# Patient Record
Sex: FEMALE | Race: WHITE | NOT HISPANIC OR LATINO | Employment: OTHER | URBAN - METROPOLITAN AREA
[De-identification: names, ages, dates, MRNs, and addresses within clinical notes are randomized per-mention and may not be internally consistent; named-entity substitution may affect disease eponyms.]

---

## 2019-01-15 ENCOUNTER — TRANSCRIBE ORDERS (OUTPATIENT)
Dept: ADMINISTRATIVE | Facility: HOSPITAL | Age: 79
End: 2019-01-15

## 2019-01-15 ENCOUNTER — APPOINTMENT (OUTPATIENT)
Dept: LAB | Facility: HOSPITAL | Age: 79
End: 2019-01-15
Payer: COMMERCIAL

## 2019-01-15 DIAGNOSIS — Z13.6 SCREENING FOR CARDIOVASCULAR CONDITION: Primary | ICD-10-CM

## 2019-01-15 DIAGNOSIS — I10 ESSENTIAL HYPERTENSION, BENIGN: ICD-10-CM

## 2019-01-15 DIAGNOSIS — E03.9 HYPOTHYROIDISM (ACQUIRED): ICD-10-CM

## 2019-01-15 DIAGNOSIS — I20.9 ANGINA PECTORIS SYNDROME (HCC): ICD-10-CM

## 2019-01-15 DIAGNOSIS — E78.1 PURE HYPERGLYCERIDEMIA: ICD-10-CM

## 2019-01-15 DIAGNOSIS — Z13.6 SCREENING FOR CARDIOVASCULAR CONDITION: ICD-10-CM

## 2019-01-15 LAB
ANION GAP SERPL CALCULATED.3IONS-SCNC: 6 MMOL/L (ref 4–13)
BUN SERPL-MCNC: 16 MG/DL (ref 7–25)
CALCIUM SERPL-MCNC: 9.7 MG/DL (ref 8.6–10.5)
CHLORIDE SERPL-SCNC: 98 MMOL/L (ref 98–107)
CHOLEST SERPL-MCNC: 131 MG/DL (ref 0–200)
CO2 SERPL-SCNC: 36 MMOL/L (ref 21–31)
CREAT SERPL-MCNC: 0.97 MG/DL (ref 0.6–1.2)
GFR SERPL CREATININE-BSD FRML MDRD: 57 ML/MIN/1.73SQ M
GLUCOSE P FAST SERPL-MCNC: 111 MG/DL (ref 65–99)
HDLC SERPL-MCNC: 57 MG/DL (ref 40–60)
LDLC SERPL CALC-MCNC: 63 MG/DL (ref 75–193)
NONHDLC SERPL-MCNC: 74 MG/DL
POTASSIUM SERPL-SCNC: 3.3 MMOL/L (ref 3.5–5.5)
SODIUM SERPL-SCNC: 140 MMOL/L (ref 134–143)
TRIGL SERPL-MCNC: 55 MG/DL (ref 44–166)
TSH SERPL DL<=0.05 MIU/L-ACNC: 3.07 UIU/ML (ref 0.45–5.33)

## 2019-01-15 PROCEDURE — 84443 ASSAY THYROID STIM HORMONE: CPT

## 2019-01-15 PROCEDURE — 36415 COLL VENOUS BLD VENIPUNCTURE: CPT

## 2019-01-15 PROCEDURE — 80061 LIPID PANEL: CPT

## 2019-01-15 PROCEDURE — 80048 BASIC METABOLIC PNL TOTAL CA: CPT

## 2019-12-03 ENCOUNTER — TRANSCRIBE ORDERS (OUTPATIENT)
Dept: PHYSICAL THERAPY | Facility: CLINIC | Age: 79
End: 2019-12-03

## 2019-12-04 ENCOUNTER — EVALUATION (OUTPATIENT)
Dept: PHYSICAL THERAPY | Facility: CLINIC | Age: 79
End: 2019-12-04
Payer: COMMERCIAL

## 2019-12-04 DIAGNOSIS — M54.2 NECK PAIN: Primary | ICD-10-CM

## 2019-12-04 PROCEDURE — 97535 SELF CARE MNGMENT TRAINING: CPT | Performed by: PHYSICAL THERAPIST

## 2019-12-04 PROCEDURE — 97162 PT EVAL MOD COMPLEX 30 MIN: CPT | Performed by: PHYSICAL THERAPIST

## 2019-12-04 PROCEDURE — 97012 MECHANICAL TRACTION THERAPY: CPT | Performed by: PHYSICAL THERAPIST

## 2019-12-04 PROCEDURE — 97140 MANUAL THERAPY 1/> REGIONS: CPT | Performed by: PHYSICAL THERAPIST

## 2019-12-04 NOTE — PROGRESS NOTES
PT Evaluation     Today's date: 2019  Patient name: Lisy Min  : 1940  MRN: [id number]  Referring provider: Jennifer Ram DO  Dx:   Encounter Diagnosis     ICD-10-CM    1  Neck pain M54 2                   Assessment  Assessment details: PT notes the patient with decrease ROM and strength t/o the cervical spine as well as the bilateral shoulder and UE with demonstration of fair UB posture leading to increase pain levels and functional limitations with need for course of skilled therapy for 6 weeks with focus on cervical/UB stabilization, manual therapy, mechanical traction, analgesic modalities, and HEP  Impairments: abnormal or restricted ROM, activity intolerance, impaired balance, impaired physical strength, lacks appropriate home exercise program, pain with function, scapular dyskinesis and poor posture   Understanding of Dx/Px/POC: good   Prognosis: good    Goals  ST  Initiate HEP  2  Decrease guarding/shoulder elevation with seated posture  3  Increase strength by 1-2 mm grades  4  Increase ROM by 25-50%   5  Decrease pain by 25-50%   LT  Decrease limitations with lifting, OH, carrying and ADL  2  Demonstration of improved UB posture  3  No radicular symptoms in the bilateral UE  4  DC with HEP    Plan  Patient would benefit from: PT eval and skilled physical therapy  Planned modality interventions: thermotherapy: hydrocollator packs and traction  Planned therapy interventions: manual therapy, patient education, postural training, neuromuscular re-education, strengthening, stretching, therapeutic exercise, home exercise program, graded exercise, functional ROM exercises and flexibility        Subjective Evaluation    History of Present Illness  Mechanism of injury: Patient reports development of neck pain about 1 5 weeks ago while exercising at home with progressive worsening over time with increase pain levels and tightness in the neck and UB    Patient reports limitations with movement, ADL, hair care, lifting and carrying objects  Patient reports with the increase symptoms also experiencing tingling in the bilateral UE traveling from the shoulders and hand/digits  Patient reports occasional unsteadiness with walking with increase pain levels  Patient reports she went to PCP for evaluation and received orders for MRI of the cervical spine as well as orders for OPPT  Patient reports significant PMH of bilateral CTR, left thumb tendon repair, Grave's Disease, and HBP  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  Pain  Current pain ratin  At best pain ratin  At worst pain rating: 10  Location: Cervical spine and UB   Quality: burning, discomfort, radiating, throbbing and tight  Relieving factors: rest  Aggravating factors: lifting and overhead activity    Treatments  Current treatment: medication and physical therapy  Patient Goals  Patient goals for therapy: decreased pain, increased motion, increased strength and independence with ADLs/IADLs          Objective     Postural Observations  Seated posture: fair  Standing posture: fair    Additional Postural Observation Details  PT notes the patient with fair UB posture with bilateral elevated shoulders, forward head and bilateral rounded shoulders     Palpation   Left   Muscle spasm in the cervical paraspinals, levator scapulae, rhomboids, scalenes and upper trapezius  Tenderness of the cervical paraspinals, levator scapulae, rhomboids, scalenes, suboccipitals and upper trapezius  Right   Muscle spasm in the cervical paraspinals, levator scapulae, rhomboids, scalenes and upper trapezius  Tenderness of the cervical paraspinals, levator scapulae, rhomboids, scalenes, suboccipitals and upper trapezius  Tenderness   Cervical Spine   Tenderness in the spinous process       Neurological Testing     Reflexes   Left   Biceps (C5/C6): normal (2+)  Brachioradialis (C6): normal (2+)    Right   Biceps (C5/C6): normal (2+)  Brachioradialis (C6): normal (2+)    Active Range of Motion   Cervical/Thoracic Spine       Cervical    Flexion: 19 degrees   Extension: 13 degrees     with pain  Left lateral flexion: 12 degrees     with pain  Right lateral flexion: 14 degrees     with pain  Left rotation: 41 degrees with pain  Right rotation: 37 degrees    with pain  Left Shoulder   Flexion: 140 degrees   Abduction: 135 degrees   External rotation 90°: 70 degrees   Internal rotation 90°: 50 degrees     Right Shoulder   Flexion: 140 degrees   Abduction: 135 degrees   External rotation 90°: 70 degrees  Internal rotation 90°: 50 degrees     Left Elbow   Normal active range of motion    Right Elbow   Normal active range of motion    Additional Active Range of Motion Details  PT notes the patient with decrease ROM and strength t/o the cervical spine and UB   PT notes decrease ROM and strength t/o the bilateral shoulders     Passive Range of Motion   Left Shoulder   Normal passive range of motion    Right Shoulder   Normal passive range of motion    Strength/Myotome Testing   Cervical Spine   Neck extension: 3+    Left   Neck lateral flexion (C3): 3+    Right   Neck lateral flexion (C3): 3+    Left Shoulder     Planes of Motion   Flexion: 3+   Extension: 4   Abduction: 3+   Adduction: 4   External rotation at 0°: 3+   External rotation at 90°: 3+   Internal rotation at 0°: 4-   Internal rotation at 90°: 4-     Right Shoulder     Planes of Motion   Flexion: 3+   Extension: 4   Abduction: 3+   Adduction: 4   External rotation at 0°: 3+   External rotation at 90°: 3+   Internal rotation at 0°: 4-   Internal rotation at 90°: 4-     Left Elbow   Flexion: 4  Extension: 4    Right Elbow   Flexion: 4  Extension: 4    Tests   Cervical   Positive vertical compression and lumbar distraction test     Left   Positive Spurling's Test A  Right   Positive Spurling's Test A  Lumbar   Positive vertical compression        Additional Tests Details  PT notes relief of symptoms with cervical traction             Precautions:  DJD/DD t/o the cervical spine        Manual  12/4       Cervical and UB mobs and stretching  15 min        Mechanical traction 4 steps   13# 30% rope speed  15 min                                    Exercise Diary  12/4       UBE         TB MTP, LTP, and ADD [address]        Scapular wall slides         Scapular pinch into wall         Wall push-up         Doorway pec stretch         Caudel glide         Cervical rotation towel stretch         Supine chin tucks         Supine press and flex        Supine Punch                                                                                     Modalities 12/4        MHP to the bilateral UB in seated  15 min

## 2019-12-04 NOTE — LETTER
2019    Mk Burrows DO  [address]    Patient: Kilo Vallejo   YOB: 1940   Date of Visit: 2019     Encounter Diagnosis     ICD-10-CM    1  Neck pain M54 2        Dear Dr Isis Olson: Thank you for your recent referral of Kilo Vallejo  Please review the attached evaluation summary from [address] [place] [address] recent visit  Please verify that you agree with the plan of care by signing the attached order  If you have any questions or concerns, please do not hesitate to call  I sincerely appreciate the opportunity to share in the care of one of your patients and hope to have another opportunity to work with you in the near future  Sincerely,    Chandana Garcia, PT      Referring Provider:      I certify that I have read the below Plan of Care and certify the need for these services furnished under this plan of treatment while under my care  Mk Burrows DO  [address]: [phone]          PT Evaluation     Today's date: 2019  Patient name: Kilo Vallejo  : 1940  MRN: [id number]  Referring provider: Diana Luong DO  Dx:   Encounter Diagnosis     ICD-10-CM    1  Neck pain M54 2                   Assessment  Assessment details: PT notes the patient with decrease ROM and strength t/o the cervical spine as well as the bilateral shoulder and UE with demonstration of fair UB posture leading to increase pain levels and functional limitations with need for course of skilled therapy for 6 weeks with focus on cervical/UB stabilization, manual therapy, mechanical traction, analgesic modalities, and HEP  Impairments: abnormal or restricted ROM, activity intolerance, impaired balance, impaired physical strength, lacks appropriate home exercise program, pain with function, scapular dyskinesis and poor posture   Understanding of Dx/Px/POC: good   Prognosis: good    Goals  ST  Initiate HEP  2  Decrease guarding/shoulder elevation with seated posture  3  Increase strength by 1-2 mm grades  4  Increase ROM by 25-50%   5  Decrease pain by 25-50%   LT  Decrease limitations with lifting, OH, carrying and ADL  2  Demonstration of improved UB posture  3  No radicular symptoms in the bilateral UE  4  DC with HEP    Plan  Patient would benefit from: PT eval and skilled physical therapy  Planned modality interventions: thermotherapy: hydrocollator packs and traction  Planned therapy interventions: manual therapy, patient education, postural training, neuromuscular re-education, strengthening, stretching, therapeutic exercise, home exercise program, graded exercise, functional ROM exercises and flexibility        Subjective Evaluation    History of Present Illness  Mechanism of injury: Patient reports development of neck pain about 1 5 weeks ago while exercising at home with progressive worsening over time with increase pain levels and tightness in the neck and UB  Patient reports limitations with movement, ADL, hair care, lifting and carrying objects  Patient reports with the increase symptoms also experiencing tingling in the bilateral UE traveling from the shoulders and hand/digits  Patient reports occasional unsteadiness with walking with increase pain levels  Patient reports she went to PCP for evaluation and received orders for MRI of the cervical spine as well as orders for OPPT  Patient reports significant PMH of bilateral CTR, left thumb tendon repair, Grave's Disease, and HBP  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      Pain  Current pain ratin  At best pain ratin  At worst pain rating: 10  Location: Cervical spine and UB   Quality: burning, discomfort, radiating, throbbing and tight  Relieving factors: rest  Aggravating factors: lifting and overhead activity    Treatments  Current treatment: medication and physical therapy  Patient Goals  Patient goals for therapy: decreased pain, increased motion, increased strength and independence with ADLs/IADLs          Objective     Postural Observations  Seated posture: fair  Standing posture: fair    Additional Postural Observation Details  PT notes the patient with fair UB posture with bilateral elevated shoulders, forward head and bilateral rounded shoulders     Palpation   Left   Muscle spasm in the cervical paraspinals, levator scapulae, rhomboids, scalenes and upper trapezius  Tenderness of the cervical paraspinals, levator scapulae, rhomboids, scalenes, suboccipitals and upper trapezius  Right   Muscle spasm in the cervical paraspinals, levator scapulae, rhomboids, scalenes and upper trapezius  Tenderness of the cervical paraspinals, levator scapulae, rhomboids, scalenes, suboccipitals and upper trapezius  Tenderness   Cervical Spine   Tenderness in the spinous process       Neurological Testing     Reflexes   Left   Biceps (C5/C6): normal (2+)  Brachioradialis (C6): normal (2+)    Right   Biceps (C5/C6): normal (2+)  Brachioradialis (C6): normal (2+)    Active Range of Motion   Cervical/Thoracic Spine       Cervical    Flexion: 19 degrees   Extension: 13 degrees     with pain  Left lateral flexion: 12 degrees     with pain  Right lateral flexion: 14 degrees     with pain  Left rotation: 41 degrees with pain  Right rotation: 37 degrees    with pain  Left Shoulder   Flexion: 140 degrees   Abduction: 135 degrees   External rotation 90°:  70 degrees   Internal rotation 90°:  50 degrees     Right Shoulder   Flexion: 140 degrees   Abduction: 135 degrees   External rotation 90°:  70 degrees  Internal rotation 90°:  50 degrees     Left Elbow   Normal active range of motion    Right Elbow   Normal active range of motion    Additional Active Range of Motion Details  PT notes the patient with decrease ROM and strength t/o the cervical spine and UB   PT notes decrease ROM and strength t/o the bilateral shoulders Passive Range of Motion   Left Shoulder   Normal passive range of motion    Right Shoulder   Normal passive range of motion    Strength/Myotome Testing   Cervical Spine   Neck extension: 3+    Left   Neck lateral flexion (C3): 3+    Right   Neck lateral flexion (C3): 3+    Left Shoulder     Planes of Motion   Flexion: 3+   Extension: 4   Abduction: 3+   Adduction: 4   External rotation at 0°:  3+   External rotation at 90°:  3+   Internal rotation at 0°:  4-   Internal rotation at 90°:  4-     Right Shoulder     Planes of Motion   Flexion: 3+   Extension: 4   Abduction: 3+   Adduction: 4   External rotation at 0°:  3+   External rotation at 90°:  3+   Internal rotation at 0°:  4-   Internal rotation at 90°:  4-     Left Elbow   Flexion: 4  Extension: 4    Right Elbow   Flexion: 4  Extension: 4    Tests   Cervical   Positive vertical compression and lumbar distraction test     Left   Positive Spurling's Test A  Right   Positive Spurling's Test A  Lumbar   Positive vertical compression        Additional Tests Details  PT notes relief of symptoms with cervical traction             Precautions:  DJD/DD t/o the cervical spine        Manual  12/4       Cervical and UB mobs and stretching  15 min        Mechanical traction 4 steps   13# 30% rope speed  15 min                                    Exercise Diary  12/4       UBE         TB MTP, LTP, and ADD [address]        Scapular wall slides         Scapular pinch into wall         Wall push-up         Doorway pec stretch         Caudel glide         Cervical rotation towel stretch         Supine chin tucks         Supine press and flex        Supine Punch                                                                                     Modalities 12/4        MHP to the bilateral UB in seated  15 min

## 2019-12-05 ENCOUNTER — OFFICE VISIT (OUTPATIENT)
Dept: PHYSICAL THERAPY | Facility: CLINIC | Age: 79
End: 2019-12-05
Payer: COMMERCIAL

## 2019-12-05 DIAGNOSIS — M54.2 NECK PAIN: Primary | ICD-10-CM

## 2019-12-05 PROCEDURE — 97140 MANUAL THERAPY 1/> REGIONS: CPT

## 2019-12-05 PROCEDURE — 97012 MECHANICAL TRACTION THERAPY: CPT

## 2019-12-05 PROCEDURE — 97110 THERAPEUTIC EXERCISES: CPT

## 2019-12-05 NOTE — PROGRESS NOTES
Daily Note     Today's date: 2019  Patient name: Madai Holden  : 1940  MRN: [id number]  Referring provider: Anthony Peter DO  Dx:   Encounter Diagnosis     ICD-10-CM    1  Neck pain M54 2        Start Time: 910          Subjective: patient c/o of increased stiffness with some pain this morning  She stated she felt good after her initial eval but as the day went on she became sore  She also said she felt better after the traction  Objective: See treatment diary below      Assessment: Educated patient on POC established for their specific diagnosis and their LOF  Verbal and visual cues required for proper execution of exercise with program  Patient uses compensatory movements for cervical rotation  Overall tolerated treatment well  Will continue to progress in upcoming visits as able      Plan: Continue per plan of care  Progress treatment as tolerated         Precautions:  DJD/DD t/o the cervical spine        Manual        Cervical and UB mobs and stretching  15 min  15 min      Mechanical traction 4 steps   13# 30% rope speed  15 min  4 steps  13#   30% rope  Speed  15 min                                   Exercise Diary        UBE   120 resist  10 min alt       TB MTP, LTP, and ADD [address]  2x10 each  Green       Scapular wall slides         Scapular pinch into wall   10x 3" hold       Wall push-up         Doorway pec stretch   3x 15" hold       Caudel glide   3x 15" hold      Cervical rotation towel stretch   3x 15" hold      Supine chin tucks   10x 5" hold      Supine press and flex  10x      Supine Punch   10x                                                                                  Modalities       MHP to the bilateral UB in seated  15 min  15 min

## 2019-12-09 ENCOUNTER — OFFICE VISIT (OUTPATIENT)
Dept: PHYSICAL THERAPY | Facility: CLINIC | Age: 79
End: 2019-12-09
Payer: COMMERCIAL

## 2019-12-09 DIAGNOSIS — M54.2 NECK PAIN: Primary | ICD-10-CM

## 2019-12-09 PROCEDURE — 97140 MANUAL THERAPY 1/> REGIONS: CPT

## 2019-12-09 PROCEDURE — 97110 THERAPEUTIC EXERCISES: CPT

## 2019-12-09 PROCEDURE — 97012 MECHANICAL TRACTION THERAPY: CPT

## 2019-12-09 NOTE — PROGRESS NOTES
Daily Note     Today's date: 2019  Patient name: Placido Reyna  : 1940  MRN: [id number]  Referring provider: Genevive Landau, DO  Dx:   Encounter Diagnosis     ICD-10-CM    1  Neck pain M54 2                   Subjective: patient stated she went a day and a half after her last visit doing normal household tasks before started to have the sx of numbness in hands and heaviness in her arms  She said when it gets bad enough she has pain down the back of her legs  Her chief complaint is unsteady while walking  Objective: See treatment diary below      Assessment: Patient able to progress with repetitions  with various exercises  Good tolerance with progression with mod amount of cues required  Patient continues to having decreased sx after traction  Patient needed min Ax1 sit to stand from a armless chair and was unsteady amb in clinic requiring close supervision     Patient continues to present with deficits with strength and ROM requiring continued skilled physical therapy        Plan: Continue per plan of care  Progress treatment as tolerated         Precautions:  DJD/DD t/o the cervical spine        Manual       Cervical and UB mobs and stretching  15 min  15 min 15 min     Mechanical traction 4 steps   13# 30% rope speed  15 min  4 steps  13#   30% rope  Speed  15 min  4 steps  13#  30% rope  Speed  15 min                                  Exercise Diary       UBE   120 resist  10 min alt  120 resist  10 min alt      TB MTP, LTP, and ADD [address]  2x10 each  Green  2x10 each  Green      Scapular wall slides    10x  I,Y,T     Scapular pinch into wall   10x 3" hold  15x 3" hold     Wall push-up    10x     Doorway pec stretch   3x 15" hold  4x 15" hold     Caudel glide   3x 15" hold 4x 15" hold     Cervical rotation towel stretch   3x 15" hold 4x 15" hold      Supine chin tucks   10x 5" hold 10x 5" hold     Supine press and flex  10x 15x     Supine Punch   10x 15x Modalities 12/4 12/5 12/9     MHP to the bilateral UB in seated  15 min  15 min 15 min

## 2019-12-11 ENCOUNTER — OFFICE VISIT (OUTPATIENT)
Dept: PHYSICAL THERAPY | Facility: CLINIC | Age: 79
End: 2019-12-11
Payer: COMMERCIAL

## 2019-12-11 ENCOUNTER — TRANSCRIBE ORDERS (OUTPATIENT)
Dept: PHYSICAL THERAPY | Facility: CLINIC | Age: 79
End: 2019-12-11

## 2019-12-11 DIAGNOSIS — M54.2 NECK PAIN: Primary | ICD-10-CM

## 2019-12-11 PROCEDURE — 97012 MECHANICAL TRACTION THERAPY: CPT

## 2019-12-11 PROCEDURE — 97140 MANUAL THERAPY 1/> REGIONS: CPT

## 2019-12-11 PROCEDURE — 97110 THERAPEUTIC EXERCISES: CPT

## 2019-12-11 NOTE — PROGRESS NOTES
Daily Note     Today's date: 2019  Patient name: Kervin Starr  : 1940  MRN: [id number]  Referring provider: Kory Way DO  Dx: No diagnosis found  Subjective: patient stated she was able to lift her arm to brush her teeth without using her other arm to help  Patient said she has still been unsteady      Objective: See treatment diary below      Assessment: patient continues to be unsteady amb in clinic reaching for furniture for support  Limited ROM with right UE with various exercises  Patient unable to full extend R elbow with wall push ups  Assistance needed with sit to stand transfer with armless chair and with supine to sit transfer  Will continue to monitor pain and endurance and progress as able  Plan: Continue per plan of care  Progress treatment as tolerated         Precautions:  DJD/DD t/o the cervical spine        Manual      Cervical and UB mobs and stretching  15 min  15 min 15 min 15 min     Mechanical traction 4 steps   13# 30% rope speed  15 min  4 steps  13#   30% rope  Speed  15 min  4 steps  13#  30% rope  Speed  15 min  4 step  13#  30% rope  Speed                                 Exercise Diary      UBE   120 resist  10 min alt  120 resist  10 min alt  NuStep  10 min L3    TB MTP, LTP, and ADD [address]  2x10 each  Green  2x10 each  Green  2x10 each   Green     Scapular wall slides    10x  I,Y,T 10x  I,Y,T    Scapular pinch into wall   10x 3" hold  15x 3" hold 15x 3" hold     Wall push-up    10x 15x    Doorway pec stretch   3x 15" hold  4x 15" hold 4x 15" hold     Caudel glide   3x 15" hold 4x 15" hold 4x 15 hold     Cervical rotation towel stretch   3x 15" hold 4x 15" hold  4x 15 hold     Supine chin tucks   10x 5" hold 10x 5" hold 10x 5" hold     Supine press and flex  10x 15x 15x    Supine Punch   10x 15x 15x                                                                                Modalities  MHP to the bilateral UB in seated  15 min  15 min 15 min

## 2019-12-12 ENCOUNTER — HOSPITAL ENCOUNTER (INPATIENT)
Facility: HOSPITAL | Age: 79
LOS: 1 days | Discharge: HOME | DRG: 180 | End: 2019-12-13
Attending: EMERGENCY MEDICINE | Admitting: FAMILY MEDICINE
Payer: COMMERCIAL

## 2019-12-12 ENCOUNTER — APPOINTMENT (EMERGENCY)
Dept: CT IMAGING | Facility: HOSPITAL | Age: 79
DRG: 180 | End: 2019-12-12
Payer: COMMERCIAL

## 2019-12-12 ENCOUNTER — APPOINTMENT (EMERGENCY)
Dept: RADIOLOGY | Facility: HOSPITAL | Age: 79
DRG: 180 | End: 2019-12-12
Payer: COMMERCIAL

## 2019-12-12 ENCOUNTER — APPOINTMENT (INPATIENT)
Dept: CT IMAGING | Facility: HOSPITAL | Age: 79
DRG: 180 | End: 2019-12-12
Payer: COMMERCIAL

## 2019-12-12 DIAGNOSIS — R91.8 LUNG MASS: ICD-10-CM

## 2019-12-12 DIAGNOSIS — R05.9 COUGH: ICD-10-CM

## 2019-12-12 DIAGNOSIS — R09.02 HYPOXIA: ICD-10-CM

## 2019-12-12 DIAGNOSIS — R93.89 ABNORMAL CT OF THE CHEST: ICD-10-CM

## 2019-12-12 DIAGNOSIS — R06.02 SHORTNESS OF BREATH: ICD-10-CM

## 2019-12-12 DIAGNOSIS — J18.9 BILATERAL PNEUMONIA: ICD-10-CM

## 2019-12-12 DIAGNOSIS — R06.00 DYSPNEA: Primary | ICD-10-CM

## 2019-12-12 DIAGNOSIS — R03.0 ELEVATED BLOOD PRESSURE READING: ICD-10-CM

## 2019-12-12 PROBLEM — J96.01 ACUTE RESPIRATORY FAILURE WITH HYPOXIA (HCC): Status: ACTIVE | Noted: 2019-12-12

## 2019-12-12 PROBLEM — I10 ESSENTIAL HYPERTENSION: Status: ACTIVE | Noted: 2019-12-12

## 2019-12-12 PROBLEM — R65.10 SIRS (SYSTEMIC INFLAMMATORY RESPONSE SYNDROME) (HCC): Status: ACTIVE | Noted: 2019-12-12

## 2019-12-12 PROBLEM — E03.9 HYPOTHYROIDISM: Status: ACTIVE | Noted: 2019-12-12

## 2019-12-12 LAB
AFP-TM SERPL-MCNC: 3.1 NG/ML (ref 0.5–8)
ALBUMIN SERPL BCP-MCNC: 3.7 G/DL (ref 3.5–5)
ALP SERPL-CCNC: 152 U/L (ref 46–116)
ALT SERPL W P-5'-P-CCNC: 33 U/L (ref 12–78)
ANION GAP SERPL CALCULATED.3IONS-SCNC: 3 MMOL/L (ref 4–13)
AST SERPL W P-5'-P-CCNC: 20 U/L (ref 5–45)
BASOPHILS # BLD AUTO: 0.07 THOUSANDS/ΜL (ref 0–0.1)
BASOPHILS NFR BLD AUTO: 0 % (ref 0–1)
BILIRUB SERPL-MCNC: 0.5 MG/DL (ref 0.2–1)
BUN SERPL-MCNC: 21 MG/DL (ref 5–25)
CALCIUM SERPL-MCNC: 8.9 MG/DL (ref 8.3–10.1)
CEA SERPL-MCNC: 1 NG/ML (ref 0–3)
CHLORIDE SERPL-SCNC: 102 MMOL/L (ref 100–108)
CO2 SERPL-SCNC: 33 MMOL/L (ref 21–32)
CREAT SERPL-MCNC: 1.03 MG/DL (ref 0.6–1.3)
EOSINOPHIL # BLD AUTO: 5.4 THOUSAND/ΜL (ref 0–0.61)
EOSINOPHIL NFR BLD AUTO: 33 % (ref 0–6)
ERYTHROCYTE [DISTWIDTH] IN BLOOD BY AUTOMATED COUNT: 12.4 % (ref 11.6–15.1)
FLUAV RNA NPH QL NAA+PROBE: NORMAL
FLUBV RNA NPH QL NAA+PROBE: NORMAL
GFR SERPL CREATININE-BSD FRML MDRD: 53 ML/MIN/1.73SQ M
GLUCOSE SERPL-MCNC: 110 MG/DL (ref 65–140)
HCG-TM SERPL-SCNC: 2 MLU/ML
HCT VFR BLD AUTO: 39.6 % (ref 34.8–46.1)
HGB BLD-MCNC: 13.6 G/DL (ref 11.5–15.4)
IMM GRANULOCYTES # BLD AUTO: 0.05 THOUSAND/UL (ref 0–0.2)
IMM GRANULOCYTES NFR BLD AUTO: 0 % (ref 0–2)
LACTATE SERPL-SCNC: 0.7 MMOL/L (ref 0.5–2)
LYMPHOCYTES # BLD AUTO: 2.06 THOUSANDS/ΜL (ref 0.6–4.47)
LYMPHOCYTES NFR BLD AUTO: 12 % (ref 14–44)
MAGNESIUM SERPL-MCNC: 1.8 MG/DL (ref 1.6–2.6)
MCH RBC QN AUTO: 31.9 PG (ref 26.8–34.3)
MCHC RBC AUTO-ENTMCNC: 34.3 G/DL (ref 31.4–37.4)
MCV RBC AUTO: 93 FL (ref 82–98)
MONOCYTES # BLD AUTO: 1.3 THOUSAND/ΜL (ref 0.17–1.22)
MONOCYTES NFR BLD AUTO: 8 % (ref 4–12)
NEUTROPHILS # BLD AUTO: 7.73 THOUSANDS/ΜL (ref 1.85–7.62)
NEUTS SEG NFR BLD AUTO: 47 % (ref 43–75)
NRBC BLD AUTO-RTO: 0 /100 WBCS
PLATELET # BLD AUTO: 264 THOUSANDS/UL (ref 149–390)
PLATELET # BLD AUTO: 277 THOUSANDS/UL (ref 149–390)
PMV BLD AUTO: 8.4 FL (ref 8.9–12.7)
PMV BLD AUTO: 8.5 FL (ref 8.9–12.7)
POTASSIUM SERPL-SCNC: 3.7 MMOL/L (ref 3.5–5.3)
PROCALCITONIN SERPL-MCNC: 0.05 NG/ML
PROT SERPL-MCNC: 7.2 G/DL (ref 6.4–8.2)
RBC # BLD AUTO: 4.27 MILLION/UL (ref 3.81–5.12)
RSV RNA NPH QL NAA+PROBE: NORMAL
S PNEUM AG UR QL: NEGATIVE
SODIUM SERPL-SCNC: 138 MMOL/L (ref 136–145)
TROPONIN I SERPL-MCNC: <0.02 NG/ML
WBC # BLD AUTO: 16.61 THOUSAND/UL (ref 4.31–10.16)

## 2019-12-12 PROCEDURE — 87631 RESP VIRUS 3-5 TARGETS: CPT | Performed by: EMERGENCY MEDICINE

## 2019-12-12 PROCEDURE — 87449 NOS EACH ORGANISM AG IA: CPT | Performed by: STUDENT IN AN ORGANIZED HEALTH CARE EDUCATION/TRAINING PROGRAM

## 2019-12-12 PROCEDURE — 96374 THER/PROPH/DIAG INJ IV PUSH: CPT

## 2019-12-12 PROCEDURE — 96361 HYDRATE IV INFUSION ADD-ON: CPT

## 2019-12-12 PROCEDURE — 84145 PROCALCITONIN (PCT): CPT | Performed by: EMERGENCY MEDICINE

## 2019-12-12 PROCEDURE — 71250 CT THORAX DX C-: CPT

## 2019-12-12 PROCEDURE — 82105 ALPHA-FETOPROTEIN SERUM: CPT | Performed by: STUDENT IN AN ORGANIZED HEALTH CARE EDUCATION/TRAINING PROGRAM

## 2019-12-12 PROCEDURE — 83605 ASSAY OF LACTIC ACID: CPT | Performed by: EMERGENCY MEDICINE

## 2019-12-12 PROCEDURE — 94640 AIRWAY INHALATION TREATMENT: CPT

## 2019-12-12 PROCEDURE — 84484 ASSAY OF TROPONIN QUANT: CPT | Performed by: EMERGENCY MEDICINE

## 2019-12-12 PROCEDURE — 87040 BLOOD CULTURE FOR BACTERIA: CPT | Performed by: EMERGENCY MEDICINE

## 2019-12-12 PROCEDURE — 80053 COMPREHEN METABOLIC PANEL: CPT | Performed by: EMERGENCY MEDICINE

## 2019-12-12 PROCEDURE — 86301 IMMUNOASSAY TUMOR CA 19-9: CPT | Performed by: STUDENT IN AN ORGANIZED HEALTH CARE EDUCATION/TRAINING PROGRAM

## 2019-12-12 PROCEDURE — 86738 MYCOPLASMA ANTIBODY: CPT | Performed by: EMERGENCY MEDICINE

## 2019-12-12 PROCEDURE — 84702 CHORIONIC GONADOTROPIN TEST: CPT | Performed by: STUDENT IN AN ORGANIZED HEALTH CARE EDUCATION/TRAINING PROGRAM

## 2019-12-12 PROCEDURE — 99285 EMERGENCY DEPT VISIT HI MDM: CPT | Performed by: EMERGENCY MEDICINE

## 2019-12-12 PROCEDURE — 99223 1ST HOSP IP/OBS HIGH 75: CPT | Performed by: FAMILY MEDICINE

## 2019-12-12 PROCEDURE — 85025 COMPLETE CBC W/AUTO DIFF WBC: CPT | Performed by: EMERGENCY MEDICINE

## 2019-12-12 PROCEDURE — 36415 COLL VENOUS BLD VENIPUNCTURE: CPT | Performed by: EMERGENCY MEDICINE

## 2019-12-12 PROCEDURE — 94664 DEMO&/EVAL PT USE INHALER: CPT

## 2019-12-12 PROCEDURE — 74177 CT ABD & PELVIS W/CONTRAST: CPT

## 2019-12-12 PROCEDURE — 99285 EMERGENCY DEPT VISIT HI MDM: CPT

## 2019-12-12 PROCEDURE — 85049 AUTOMATED PLATELET COUNT: CPT | Performed by: STUDENT IN AN ORGANIZED HEALTH CARE EDUCATION/TRAINING PROGRAM

## 2019-12-12 PROCEDURE — 93005 ELECTROCARDIOGRAM TRACING: CPT

## 2019-12-12 PROCEDURE — 83735 ASSAY OF MAGNESIUM: CPT | Performed by: EMERGENCY MEDICINE

## 2019-12-12 PROCEDURE — 94760 N-INVAS EAR/PLS OXIMETRY 1: CPT

## 2019-12-12 PROCEDURE — 82378 CARCINOEMBRYONIC ANTIGEN: CPT | Performed by: STUDENT IN AN ORGANIZED HEALTH CARE EDUCATION/TRAINING PROGRAM

## 2019-12-12 PROCEDURE — 70470 CT HEAD/BRAIN W/O & W/DYE: CPT

## 2019-12-12 PROCEDURE — 99223 1ST HOSP IP/OBS HIGH 75: CPT | Performed by: PHYSICIAN ASSISTANT

## 2019-12-12 RX ORDER — BALANCED SALT SOLUTION 6.4; .75; .48; .3; 3.9; 1.7 MG/ML; MG/ML; MG/ML; MG/ML; MG/ML; MG/ML
15 SOLUTION OPHTHALMIC 2 TIMES DAILY
Status: DISCONTINUED | OUTPATIENT
Start: 2019-12-12 | End: 2019-12-13 | Stop reason: HOSPADM

## 2019-12-12 RX ORDER — ALBUTEROL SULFATE 2.5 MG/3ML
2.5 SOLUTION RESPIRATORY (INHALATION) EVERY 4 HOURS PRN
Status: DISCONTINUED | OUTPATIENT
Start: 2019-12-12 | End: 2019-12-13 | Stop reason: HOSPADM

## 2019-12-12 RX ORDER — DILTIAZEM HYDROCHLORIDE 180 MG/1
CAPSULE, COATED, EXTENDED RELEASE ORAL
Refills: 5 | COMMUNITY
Start: 2019-11-19

## 2019-12-12 RX ORDER — METHYLPREDNISOLONE SODIUM SUCCINATE 125 MG/2ML
60 INJECTION, POWDER, LYOPHILIZED, FOR SOLUTION INTRAMUSCULAR; INTRAVENOUS ONCE
Status: DISCONTINUED | OUTPATIENT
Start: 2019-12-12 | End: 2019-12-12

## 2019-12-12 RX ORDER — LORATADINE 10 MG/1
10 TABLET ORAL DAILY
Status: DISCONTINUED | OUTPATIENT
Start: 2019-12-12 | End: 2019-12-13 | Stop reason: HOSPADM

## 2019-12-12 RX ORDER — OLOPATADINE HYDROCHLORIDE 1 MG/ML
1 SOLUTION/ DROPS OPHTHALMIC 2 TIMES DAILY
COMMUNITY
Start: 2019-06-19

## 2019-12-12 RX ORDER — ATORVASTATIN CALCIUM 40 MG/1
40 TABLET, FILM COATED ORAL EVERY EVENING
Status: DISCONTINUED | OUTPATIENT
Start: 2019-12-12 | End: 2019-12-13 | Stop reason: HOSPADM

## 2019-12-12 RX ORDER — CEFTRIAXONE 1 G/50ML
1000 INJECTION, SOLUTION INTRAVENOUS EVERY 24 HOURS
Status: DISCONTINUED | OUTPATIENT
Start: 2019-12-13 | End: 2019-12-12

## 2019-12-12 RX ORDER — HYDRALAZINE HYDROCHLORIDE 20 MG/ML
10 INJECTION INTRAMUSCULAR; INTRAVENOUS EVERY 6 HOURS PRN
Status: DISCONTINUED | OUTPATIENT
Start: 2019-12-12 | End: 2019-12-13 | Stop reason: HOSPADM

## 2019-12-12 RX ORDER — IPRATROPIUM BROMIDE AND ALBUTEROL SULFATE 2.5; .5 MG/3ML; MG/3ML
SOLUTION RESPIRATORY (INHALATION)
Status: COMPLETED
Start: 2019-12-12 | End: 2019-12-12

## 2019-12-12 RX ORDER — ATORVASTATIN CALCIUM 40 MG/1
TABLET, FILM COATED ORAL
COMMUNITY
Start: 2019-05-06

## 2019-12-12 RX ORDER — TRIAMTERENE AND HYDROCHLOROTHIAZIDE 37.5; 25 MG/1; MG/1
TABLET ORAL
Refills: 1 | COMMUNITY
Start: 2019-10-01

## 2019-12-12 RX ORDER — BALANCED SALT SOLUTION 6.4; .75; .48; .3; 3.9; 1.7 MG/ML; MG/ML; MG/ML; MG/ML; MG/ML; MG/ML
15 SOLUTION OPHTHALMIC 2 TIMES DAILY
Status: DISCONTINUED | OUTPATIENT
Start: 2019-12-13 | End: 2019-12-12

## 2019-12-12 RX ORDER — ISOSORBIDE MONONITRATE 60 MG/1
TABLET, EXTENDED RELEASE ORAL
Refills: 3 | COMMUNITY
Start: 2019-12-10

## 2019-12-12 RX ORDER — NITROGLYCERIN 0.3 MG/1
0.3 TABLET SUBLINGUAL
COMMUNITY
Start: 2011-04-21

## 2019-12-12 RX ORDER — CEFTRIAXONE SODIUM 2 G/50ML
2000 INJECTION, SOLUTION INTRAVENOUS ONCE
Status: COMPLETED | OUTPATIENT
Start: 2019-12-12 | End: 2019-12-12

## 2019-12-12 RX ORDER — IPRATROPIUM BROMIDE AND ALBUTEROL SULFATE 2.5; .5 MG/3ML; MG/3ML
3 SOLUTION RESPIRATORY (INHALATION) ONCE
Status: COMPLETED | OUTPATIENT
Start: 2019-12-12 | End: 2019-12-12

## 2019-12-12 RX ORDER — BALANCED SALT SOLUTION 6.4; .75; .48; .3; 3.9; 1.7 MG/ML; MG/ML; MG/ML; MG/ML; MG/ML; MG/ML
15 SOLUTION OPHTHALMIC 2 TIMES DAILY
Status: DISCONTINUED | OUTPATIENT
Start: 2019-12-12 | End: 2019-12-12

## 2019-12-12 RX ORDER — OMEGA-3S/DHA/EPA/FISH OIL/D3 300MG-1000
400 CAPSULE ORAL DAILY
Status: DISCONTINUED | OUTPATIENT
Start: 2019-12-12 | End: 2019-12-13 | Stop reason: HOSPADM

## 2019-12-12 RX ORDER — EPINEPHRINE 0.3 MG/.3ML
INJECTION SUBCUTANEOUS
COMMUNITY
Start: 2010-04-13

## 2019-12-12 RX ORDER — TRIAMTERENE AND HYDROCHLOROTHIAZIDE 37.5; 25 MG/1; MG/1
1 TABLET ORAL DAILY
Status: DISCONTINUED | OUTPATIENT
Start: 2019-12-12 | End: 2019-12-13 | Stop reason: HOSPADM

## 2019-12-12 RX ORDER — LEVOTHYROXINE SODIUM 125 UG/1
TABLET ORAL EVERY OTHER DAY
Refills: 0 | COMMUNITY
Start: 2019-10-25

## 2019-12-12 RX ORDER — ACETAMINOPHEN 160 MG
1 TABLET,DISINTEGRATING ORAL DAILY
COMMUNITY
Start: 2016-10-12

## 2019-12-12 RX ORDER — DILTIAZEM HYDROCHLORIDE 180 MG/1
180 CAPSULE, COATED, EXTENDED RELEASE ORAL DAILY
Status: DISCONTINUED | OUTPATIENT
Start: 2019-12-12 | End: 2019-12-13 | Stop reason: HOSPADM

## 2019-12-12 RX ORDER — LEVOFLOXACIN 5 MG/ML
750 INJECTION, SOLUTION INTRAVENOUS EVERY 24 HOURS
Status: DISCONTINUED | OUTPATIENT
Start: 2019-12-13 | End: 2019-12-12

## 2019-12-12 RX ORDER — ASPIRIN 81 MG/1
81 TABLET, CHEWABLE ORAL DAILY
Status: DISCONTINUED | OUTPATIENT
Start: 2019-12-12 | End: 2019-12-13 | Stop reason: HOSPADM

## 2019-12-12 RX ORDER — ACETAMINOPHEN 325 MG/1
650 TABLET ORAL EVERY 6 HOURS PRN
Status: DISCONTINUED | OUTPATIENT
Start: 2019-12-12 | End: 2019-12-13 | Stop reason: HOSPADM

## 2019-12-12 RX ORDER — LEVOFLOXACIN 5 MG/ML
750 INJECTION, SOLUTION INTRAVENOUS
Status: DISCONTINUED | OUTPATIENT
Start: 2019-12-13 | End: 2019-12-13 | Stop reason: HOSPADM

## 2019-12-12 RX ORDER — IPRATROPIUM BROMIDE AND ALBUTEROL SULFATE 2.5; .5 MG/3ML; MG/3ML
3 SOLUTION RESPIRATORY (INHALATION)
Status: DISCONTINUED | OUTPATIENT
Start: 2019-12-12 | End: 2019-12-13

## 2019-12-12 RX ORDER — ASPIRIN 81 MG/1
TABLET, CHEWABLE ORAL
COMMUNITY
Start: 2012-09-05

## 2019-12-12 RX ORDER — METHYLPREDNISOLONE SODIUM SUCCINATE 40 MG/ML
40 INJECTION, POWDER, LYOPHILIZED, FOR SOLUTION INTRAMUSCULAR; INTRAVENOUS EVERY 12 HOURS SCHEDULED
Status: DISCONTINUED | OUTPATIENT
Start: 2019-12-12 | End: 2019-12-13

## 2019-12-12 RX ORDER — LEVOTHYROXINE SODIUM 112 MCG
TABLET ORAL EVERY OTHER DAY
Refills: 0 | COMMUNITY
Start: 2019-10-25

## 2019-12-12 RX ORDER — METHYLPREDNISOLONE SODIUM SUCCINATE 125 MG/2ML
60 INJECTION, POWDER, LYOPHILIZED, FOR SOLUTION INTRAMUSCULAR; INTRAVENOUS ONCE
Status: COMPLETED | OUTPATIENT
Start: 2019-12-12 | End: 2019-12-12

## 2019-12-12 RX ORDER — LEVOTHYROXINE SODIUM 0.12 MG/1
125 TABLET ORAL EVERY OTHER DAY
Status: DISCONTINUED | OUTPATIENT
Start: 2019-12-12 | End: 2019-12-13 | Stop reason: HOSPADM

## 2019-12-12 RX ADMIN — TRIAMTERENE AND HYDROCHLOROTHIAZIDE 1 TABLET: 37.5; 25 TABLET ORAL at 10:14

## 2019-12-12 RX ADMIN — IPRATROPIUM BROMIDE AND ALBUTEROL SULFATE 3 ML: 2.5; .5 SOLUTION RESPIRATORY (INHALATION) at 08:10

## 2019-12-12 RX ADMIN — IPRATROPIUM BROMIDE AND ALBUTEROL SULFATE 3 ML: 2.5; .5 SOLUTION RESPIRATORY (INHALATION) at 13:26

## 2019-12-12 RX ADMIN — AZITHROMYCIN MONOHYDRATE 500 MG: 500 INJECTION, POWDER, LYOPHILIZED, FOR SOLUTION INTRAVENOUS at 08:30

## 2019-12-12 RX ADMIN — METHYLPREDNISOLONE SODIUM SUCCINATE 60 MG: 125 INJECTION, POWDER, FOR SOLUTION INTRAMUSCULAR; INTRAVENOUS at 05:21

## 2019-12-12 RX ADMIN — IPRATROPIUM BROMIDE AND ALBUTEROL SULFATE 3 ML: 2.5; .5 SOLUTION RESPIRATORY (INHALATION) at 20:35

## 2019-12-12 RX ADMIN — METHYLPREDNISOLONE SODIUM SUCCINATE 40 MG: 40 INJECTION, POWDER, FOR SOLUTION INTRAMUSCULAR; INTRAVENOUS at 20:39

## 2019-12-12 RX ADMIN — IOHEXOL 100 ML: 350 INJECTION, SOLUTION INTRAVENOUS at 08:47

## 2019-12-12 RX ADMIN — SODIUM CHLORIDE 1000 ML: 0.9 INJECTION, SOLUTION INTRAVENOUS at 05:19

## 2019-12-12 RX ADMIN — ENOXAPARIN SODIUM 40 MG: 40 INJECTION SUBCUTANEOUS at 10:14

## 2019-12-12 RX ADMIN — DESMOPRESSIN ACETATE 40 MG: 0.2 TABLET ORAL at 18:34

## 2019-12-12 RX ADMIN — CEFTRIAXONE SODIUM 2000 MG: 2 INJECTION, SOLUTION INTRAVENOUS at 06:43

## 2019-12-12 RX ADMIN — ACETAMINOPHEN 650 MG: 325 TABLET, FILM COATED ORAL at 18:34

## 2019-12-12 RX ADMIN — ASPIRIN 81 MG 81 MG: 81 TABLET ORAL at 10:14

## 2019-12-12 RX ADMIN — LEVOTHYROXINE SODIUM 125 MCG: 125 TABLET ORAL at 10:21

## 2019-12-12 RX ADMIN — CHOLECALCIFEROL (VITAMIN D3) 10 MCG (400 UNIT) TABLET 400 UNITS: at 10:13

## 2019-12-12 RX ADMIN — IPRATROPIUM BROMIDE AND ALBUTEROL SULFATE 3 ML: 2.5; .5 SOLUTION RESPIRATORY (INHALATION) at 05:20

## 2019-12-12 RX ADMIN — DILTIAZEM HYDROCHLORIDE 180 MG: 180 CAPSULE, COATED, EXTENDED RELEASE ORAL at 10:12

## 2019-12-12 NOTE — H&P
History and Physical - [facility] Internal Medicine    Patient Information: Madai Holden 70 y o  female MRN: [id number]  Unit/Bed#: 461-33 Encounter: [id number]  Admitting Physician: Sridevi Hodge MD  PCP: Chaz Llanes MD  Date of Admission:  12/12/19    Assessment/Plan:      Abnormal CT of the chest  Assessment & Plan  Ct chest concerning for metastatic disease  CT Head/Abd/Pelvic to locate source  Tumor markers, CEA, AFP, HCG,   Onc consult, IR, Pulmonary       Shortness of breath  Assessment & Plan  2-3 worsening shortness of breath  Differential URI, malignancy, pneumonia  X-ray shows pneumonia, no evidence of pneumonia on CT  Will continue antibiotics for now  Respiratory protocol  incentive spirometry  Trend WBC, pro suzan, cultures    Hypothyroidism  Assessment & Plan  Stable on home meds  Continue home dose medication        VTE Prophylaxis: Enoxaparin (Lovenox)  / sequential compression device   Code Status: Full  POLST: POLST form is not discussed and not completed at this time  Anticipated Length of Stay:  Patient will be admitted on an Inpatient basis with an anticipated length of stay of  > 2 midnights  Justification for Hospital Stay: SOB/PNA    Total Time for Visit, including Counseling / Coordination of Care: 20 minutes  Greater than 50% of this total time spent on direct patient counseling and coordination of care  Chief Complaint:   Shortness of breath    History of Present Illness:    Madai Holden is a 70 y o  female who presents with shortness of breath  Patient reports worsening shortness of breath over the past 2-3 days, accompanied by productive cough of yellow sputum, tightness in her chest, fatigue and generalized weakness  She denies any fevers, chills, changes in weight  She denies any sick contact  Denies any alleviating factors, however does state colder air makes her symptoms worse  Smoker with 7 PP years in her 25s, no history of asthma, COPD    Up until 2 days ago has been in excellent state of health    Presents in emergency room, with pneumonia like symptoms with evidence on x-ray initially treated with Rocephin/azithromycin/steroids  Follow-up CT scan in ED, concerns for metastatic diseas    At time of admission, patient in denial about concerns for metastatic disease  Attributing abnormal CT findings, as scar tissue secondary to chest tube she had several years ago  Review of Systems:    Review of Systems   Constitutional: Positive for fatigue  Negative for activity change, appetite change, chills and unexpected weight change  HENT: Positive for congestion  Negative for dental problem, drooling, ear discharge, ear pain, facial swelling, hearing loss, mouth sores, nosebleeds, postnasal drip, rhinorrhea, sinus pressure, sinus pain, sneezing, sore throat and tinnitus  Eyes: Negative for pain, discharge, redness and itching  Respiratory: Positive for cough, choking, chest tightness and shortness of breath  Negative for apnea, wheezing and stridor  Cardiovascular: Negative for chest pain, palpitations and leg swelling  Gastrointestinal: Negative for abdominal distention, abdominal pain, anal bleeding, blood in stool, constipation, diarrhea, nausea, rectal pain and vomiting  Endocrine: Negative for cold intolerance, heat intolerance and polydipsia  Genitourinary: Negative for decreased urine volume, difficulty urinating, dyspareunia, dysuria, enuresis, flank pain, frequency, genital sores, hematuria, menstrual problem, pelvic pain, urgency, vaginal bleeding, vaginal discharge and vaginal pain  Musculoskeletal: Negative for arthralgias, back pain, gait problem, joint swelling, myalgias, neck pain and neck stiffness  Skin: Negative for color change, pallor and rash  Allergic/Immunologic: Negative for environmental allergies, food allergies and immunocompromised state  Neurological: Positive for weakness   Negative for dizziness, seizures, facial asymmetry, speech difficulty, light-headedness, numbness and headaches  Hematological: Negative for adenopathy  Does not bruise/bleed easily  Psychiatric/Behavioral: Negative for agitation, behavioral problems, confusion, decreased concentration, dysphoric mood and hallucinations  The patient is not nervous/anxious and is not hyperactive  Past Medical and Surgical History:     Past Medical History:   Diagnosis Date    Hypertension     Neck pain     Pneumohemothorax     left       Past Surgical History:   Procedure Laterality Date    HAND SURGERY Bilateral        Meds/Allergies:    Prior to Admission medications    Medication Sig Start Date End Date Taking? Authorizing Provider   aspirin 81 mg chewable tablet one tablet by mouth once daily 9/5/12  Yes Historical Provider, MD   atorvastatin (LIPITOR) 40 mg tablet TAKE ONE (1) TABLET DAILY   5/6/19  Yes Historical Provider, MD   Cetirizine HCl (ZYRTEC ALLERGY) 10 MG CAPS Take 10 mg by mouth 11/15/17  Yes Historical Provider, MD   Cholecalciferol (VITAMIN D3) 50 MCG (2000 UT) capsule Take 1 tablet by mouth daily 10/12/16  Yes Historical Provider, MD   EPINEPHrine (EPIPEN) 0 3 mg/0 3 mL SOAJ As directed 4/13/10  Yes Historical Provider, MD   nitroglycerin (NITROSTAT) 0 3 mg SL tablet Place 0 3 mg under the tongue 4/21/11  Yes Historical Provider, MD   olopatadine (PATANOL) 0 1 % ophthalmic solution Apply 1 drop to eye 2 (two) times a day 6/19/19  Yes Historical Provider, MD   diltiazem (CARDIZEM CD) 180 mg 24 hr capsule  11/19/19   Historical Provider, MD   isosorbide mononitrate (IMDUR) 60 mg 24 hr tablet  12/10/19   Historical Provider, MD   SYNTHROID 112 MCG tablet every other day  10/25/19   Historical Provider, MD   SYNTHROID 125 MCG tablet every other day  10/25/19   Historical Provider, MD   triamterene-hydrochlorothiazide (MAXZIDE-25) 37 5-25 mg per tablet  10/1/19   Historical Provider, MD     I have reviewed home medications with patient personally  Allergies: Allergies   Allergen Reactions    Bee Venom Anaphylaxis    Penicillins Anaphylaxis       Family History:    non-contributory    Physical Exam:     Vitals:   Blood Pressure: (!) 183/79 (12/12/19 0809)  Pulse: 96 (12/12/19 0809)  Temperature: 97 8 °F (36 6 °C) (12/12/19 0809)  Respirations: 18 (12/12/19 0809)  Height: 5' 2" (157 5 cm) (12/12/19 0459)  Weight - Scale: 69 7 kg (153 lb 10 6 oz) (12/12/19 0459)  SpO2: 96 % (12/12/19 0809)    Physical Exam   Constitutional: She is oriented to person, place, and time  She appears well-developed and well-nourished  Non-toxic appearance  She does not appear ill  No distress  HENT:   Head: Normocephalic and atraumatic  Eyes: Pupils are equal, round, and reactive to light  EOM are normal    Neck: Normal range of motion  Neck supple  Cardiovascular: Normal rate and regular rhythm  Pulmonary/Chest: Effort normal  She has rhonchi  Abdominal: Soft  Bowel sounds are normal    Musculoskeletal: Normal range of motion  Right lower leg: Normal         Left lower leg: Normal    Neurological: She is alert and oriented to person, place, and time  Skin: Skin is warm and dry  Capillary refill takes less than 2 seconds  Psychiatric: She has a normal mood and affect  Her behavior is normal    Nursing note and vitals reviewed  Additional Data:     Lab Results: I have personally reviewed pertinent reports  Results from last 7 days   Lab Units 12/12/19  0511   WBC Thousand/uL 16 61*   HEMOGLOBIN g/dL 13 6   HEMATOCRIT % 39 6   PLATELETS Thousands/uL 277   NEUTROS PCT % 47   LYMPHS PCT % 12*   MONOS PCT % 8   EOS PCT % 33*     Results from last 7 days   Lab Units 12/12/19  0511   POTASSIUM mmol/L 3 7   CHLORIDE mmol/L 102   CO2 mmol/L 33*   BUN mg/dL 21   CREATININE mg/dL 1 03   CALCIUM mg/dL 8 9   ALK PHOS U/L 152*   ALT U/L 33   AST U/L 20           Imaging: I have personally reviewed pertinent reports        No results found       Epic / eduFire Everywhere Records Reviewed: Yes     ** Please Note: This note has been constructed using a voice recognition system   **

## 2019-12-12 NOTE — ED NOTES
7:20 AM - Received sign out from Dr Vivian Frye concerning pt w/ a history of CAD, HTN, and HLD who presents with increasing sob and productive cough  Workup is significant for a spiculated RUL mass on ct scan  Pt was started on abx  Awaiting the hospitalist to place admitting orders       Aram Neves DO  12/18/19 8903

## 2019-12-12 NOTE — ASSESSMENT & PLAN NOTE
Ct chest concerning for metastatic disease  CT Head/Abd/Pelvic to locate source  Tumor markers, CEA, AFP, HCG,   Onc consult, IR, Pulmonary

## 2019-12-12 NOTE — CONSULTS
Consultation - Pulmonary Medicine   Lauar Tran 70 y o  female MRN: [id number]  Unit/Bed#: 416-01 Encounter: [id number]      Assessment/Plan:    1  Acute hypoxic respiratory failure  1  No documented hypoxia but reports of SpO2 in 80s on arrival  2  No oxygen requirements at baseline  3  Titrated off of O2 while at bedside with oxygen saturations remaining above 92%  4  Monitor SpO2 and keep above 90%  5  Pulmonary toilet: increase activity as tolerated, out of bed as tolerated, cough and deep breathing encouraged  6  Continue DuoNeb for pulmonary toileting as well  2  Abnormal CT chest secondary to right upper lobe spiculated mass with concern for metastasis vs pneumonia  1  CT chest wo contrast reveals 5 7 x 4 1 cm spiculated mass in the right upper lobe, 1 7 x 2 2 cm nodule in posterior left upper lobe and small irregular nodular density in lingula with multiple subcentimeter nodules scattered throughout lungs and enlarged right hilar lymph node and right paratracheal lymph  2  Continue Zithromax and Rocephin given improvement in leukocytosis  3  Influenza a/B in RSV negative  4  Blood cultures, strep pneumoniae, sputum culture, and procalcitonin pending  5  Continue DuoNeb tid  6  Solumedrol 60 mg IV once today  7  CT abdomen and pelvis negative for metastasis   8  CT head negative for metastasis- did show pansinusitis   9  Recommend outpatient pulmonary follow-up with PET-CT to determine staging and appropriate locations for biopsy  10  Discussed these findings with patient who is agreeable to further diagnostic testings and potential treatment options  11  Oncology consult pending     History of Present Illness   Physician Requesting Consult: Delmy Chavez MD  Reason for Consult / Principal Problem: abnormal ct chest  Hx and PE limited by: n/a  Chief Complaint: shortness of breath  HPI: Laura Tran is a 70 y o    female who presented to [facility] [facility] with complaints of shortness of breath x3 days  Patient has a past medical history positive for hypothyroidism, hypertension, history of pneumohemothorax, and recent neck injury  Patient reports 2-3 days of worsening shortness of breath, chest tightness, wheeze, cough productive of yellow/green sputum, fatigue, and body aches in her arms and legs  Denies fevers, chills, night sweats  No decreased appetite or abnormal weight loss  Denies hemoptysis  Denies sick contacts  Patient did receive her flu shot this year  Patient reports several weeks ago washing her windows in cleaning or drapes were she believes she injured her neck  She went to see her PCP last week in her normal state of health minus neck pain made it difficult for her to lift her arms  She has been going to physical therapy without difficulties  Patient reports symptoms mentioned above primarily at night  She decided to seek out medical attention yesterday after she could not catch her breath  According to patient should presents to the ED in cold ill due to close proximity to her house and SpO2 at that time was in the 80s  Supplemental oxygen was started as well as azithromycin, ceftriaxone, DuoNeb, and Solu-Medrol 60 mg IV once  Patient reports significant improvement symptoms  CT chest obtained showed large spiculated mass in right upper lobe with contralateral mass in left upper lobe and lingula with multiple subcentimeter nodules bilaterally and mediastinal and hilar adenopathy  Patient informs me that she has a significant family history of multitude of cancers including breast cancer in her maternal side and skin cancer her paternal side  Denies knowledge of lung cancer on either side of family  Denies personal history of cancer herself  From a pulmonary perspective, patient does not follow a pulmonologist   She does not have a diagnosis of knee known lung disease including emphysema, COPD, asthma  She does not require inhalers or oxygen at baseline  Patient states or that she had an pneumohemothorax approximately 12 years ago that was corrected with chest tube  Other than that she reports no hospitalizations due to respiratory complaints  Patient is a former smoker with a quit date over 30 years ago  States that she smoked less than a pack a day for approximately 15 years in her 25s and 35s  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 but denies exposures to notice testis or silica  Denies bird exposure  Inpatient consult to Pulmonology  Consult performed by: Arlet Hogan PA-C  Consult ordered by: Davide Crabtree MD          Review of Systems   Respiratory: Positive for cough, shortness of breath and wheezing  All other systems reviewed and are negative  Historical Information   Past Medical History:   Diagnosis Date    Hypertension     Neck pain     Pneumohemothorax     left     Past Surgical History:   Procedure Laterality Date    HAND SURGERY Bilateral      Social History   Social History     Substance and Sexual Activity   Alcohol Use Never    Frequency: Never     Social History     Substance and Sexual Activity   Drug Use Never     Social History     Tobacco Use   Smoking Status Never Smoker   Smokeless Tobacco Never Used     Occupational History: retired nurse    Family History: History reviewed  No pertinent family history     Father had skin cancer  Mother had ovarian cancer  Sister had breast cancer  Aunt had breast cancer    Meds/Allergies   all current active meds have been reviewed, pertinent pulmonary meds have been reviewed and current meds:   Current Facility-Administered Medications   Medication Dose Route Frequency    acetaminophen (TYLENOL) tablet 650 mg  650 mg Oral Q6H PRN    albuterol inhalation solution 2 5 mg  2 5 mg Nebulization Q4H PRN    aspirin chewable tablet 81 mg  81 mg Oral Daily    atorvastatin (LIPITOR) tablet 40 mg  40 mg Oral QPM    balanced salt solution (BSS) intraocular irrigation solution 15 mL  15 mL Both Eyes BID    [START ON 12/13/2019] cefTRIAXone (ROCEPHIN) IVPB (premix) 1,000 mg  1,000 mg Intravenous Q24H    cholecalciferol (VITAMIN D3) tablet 400 Units  400 Units Oral Daily    diltiazem (CARDIZEM CD) 24 hr capsule 180 mg  180 mg Oral Daily    enoxaparin (LOVENOX) subcutaneous injection 40 mg  40 mg Subcutaneous Daily    ipratropium-albuterol (DUO-NEB) 0 5-2 5 mg/3 mL inhalation solution 3 mL  3 mL Nebulization TID    levothyroxine tablet 125 mcg  125 mcg Oral Every Other Day    loratadine (CLARITIN) tablet 10 mg  10 mg Oral Daily    methylPREDNISolone sodium succinate (Solu-MEDROL) injection 60 mg  60 mg Intravenous Once    triamterene-hydrochlorothiazide (MAXZIDE-25) 37 5-25 mg per tablet 1 tablet  1 tablet Oral Daily       Allergies   Allergen Reactions    Bee Venom Anaphylaxis    Penicillins Anaphylaxis       Objective   Vitals: Blood pressure (!) 183/79, pulse 90, temperature 97 8 °F (36 6 °C), resp  rate 22, height 5' 2" (1 575 m), weight 69 7 kg (153 lb 10 6 oz), SpO2 97 %  room air,Body mass index is 28 1 kg/m²  Intake/Output Summary (Last 24 hours) at 12/12/2019 1125  Last data filed at 12/12/2019 0719  Gross per 24 hour   Intake 1050 ml   Output    Net 1050 ml     Invasive Devices     Peripheral Intravenous Line            Peripheral IV 12/12/19 Right Antecubital less than 1 day                Physical Exam   Constitutional: She is oriented to person, place, and time  She appears well-developed and well-nourished  No distress  HENT:   Head: Normocephalic and atraumatic  Right Ear: External ear normal    Left Ear: External ear normal    Nose: Nose normal    Mouth/Throat: Oropharynx is clear and moist  No oropharyngeal exudate  Eyes: Pupils are equal, round, and reactive to light  Conjunctivae and EOM are normal  Right eye exhibits no discharge  Left eye exhibits no discharge  Neck: Normal range of motion  Neck supple   No tracheal deviation present  Cardiovascular: Normal rate, regular rhythm, normal heart sounds and intact distal pulses  Exam reveals no gallop and no friction rub  No murmur heard  Pulmonary/Chest: Effort normal and breath sounds normal  No respiratory distress  She has no wheezes  She has no rales  Abdominal: Soft  Bowel sounds are normal  There is no tenderness  Musculoskeletal: Normal range of motion  She exhibits no edema, tenderness or deformity  Neurological: She is alert and oriented to person, place, and time  No cranial nerve deficit  Skin: Skin is warm and dry  She is not diaphoretic  No erythema  No pallor  Psychiatric: She has a normal mood and affect  Her behavior is normal  Judgment and thought content normal    Vitals reviewed  Lab Results:   I have personally reviewed pertinent lab results  , ABG: No results found for: PHART, WVL1FDQ, PO2ART, SRA2MWE, K5ZEBFDI, BEART, SOURCE, BNP: No results found for: BNP, CBC:   Lab Results   Component Value Date    WBC 16 61 (H) 12/12/2019    HGB 13 6 12/12/2019    HCT 39 6 12/12/2019    MCV 93 12/12/2019     12/12/2019    MCH 31 9 12/12/2019    MCHC 34 3 12/12/2019    RDW 12 4 12/12/2019    MPV 8 5 (L) 12/12/2019    NRBC 0 12/12/2019   , CMP:   Lab Results   Component Value Date    SODIUM 138 12/12/2019    K 3 7 12/12/2019     12/12/2019    CO2 33 (H) 12/12/2019    BUN 21 12/12/2019    CREATININE 1 03 12/12/2019    CALCIUM 8 9 12/12/2019    AST 20 12/12/2019    ALT 33 12/12/2019    ALKPHOS 152 (H) 12/12/2019    EGFR 53 12/12/2019   , PT/INR: No results found for: PT, INR, Troponin:   Lab Results   Component Value Date    TROPONINI <0 02 12/12/2019       Imaging Studies: I have personally reviewed pertinent reports  and I have personally reviewed pertinent films in PACS     CT chest without contrast 12/12/2019  Irregular spiculated mass in right upper lobe measuring 5 7 x 4 1 cm  1 7 x 2 2 cm in posterior lateral left upper lobe    Small irregular nodular density in the lingula  Multiple subcentimeter nodules scattered throughout both lungs  Mediastinal and hilar lymph adenopathy noted  EKG, Pathology, and Other Studies: none to review as of yet     Pulmonary Results (PFTs, PSG): none to review     VTE Prophylaxis: Sequential compression device (Venodyne)  and Enoxaparin (Lovenox)    Code Status: Level 1 - Full Code      Portions of the record may have been created with voice recognition software  Occasional wrong word or "sound a like" substitutions may have occurred due to the inherent limitations of voice recognition software  Read the chart carefully and recognize, using context, where substitutions have occurred

## 2019-12-12 NOTE — SOCIAL WORK
Cm met with the patient to evaluate the patients prior function and living situation and any barriers to d/c and form a safe d/c plan  Cm also evaluated the patient for any services in the home or needs for services  Pt resides at home with her spouse in a 2 story house  Has 1 STEFFANY then bedroom/bathroom are on the 2nd floor (also has bathroom on her first floor)  Pt is independent with her adls and ambulation  Re: services goes to OP therapy at [facility] in [place] and no DME  Pt and spouse both drive  PCP is [facility] in [facility] was seeing Dr Jostin Garcia who retired and is now seeing another provider there  Pharmacy is [facility]  Pt plans home on dc with OP therapy at [facility] and outpatient follow up  CM will follow and assist in dc planning

## 2019-12-12 NOTE — ED PROVIDER NOTES
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 coming in today with shortness of breath  She states that she has had URI signs symptoms for the past 2 days  Tonight she is having worsening coughing over the past night  She does have a productive cough of yellow greenish sputum  She denies any fevers, chills, sick contacts, recent surgeries  She did get the influenza vaccine this year and does receive the pneumonia vaccination  She denies any chest pain or pressure  She has no syncope or palpitations    She has been taking her medications as prescribed without any changes      History provided by:  Patient   used: No    Shortness of Breath   Severity:  Mild  Onset quality:  Gradual  Timing:  Constant  Progression:  Worsening  Chronicity:  Recurrent  Context: URI    Context: not activity, not animal exposure, not emotional upset, not fumes, not known allergens, not occupational exposure, not pollens, not smoke exposure, not strong odors and not weather changes    Relieved by:  Nothing  Worsened by:  Nothing  Ineffective treatments:  None tried  Associated symptoms: cough, sputum production and wheezing    Associated symptoms: no abdominal pain, no chest pain, no claudication, no diaphoresis, no ear pain, no fever, no headaches, no hemoptysis, no neck pain, no PND, no rash, no sore throat, no syncope, no swollen glands and no vomiting    Cough:     Cough characteristics:  Productive    Sputum characteristics:  Yellow and green    Severity:  Moderate    Onset quality:  Gradual    Timing:  Intermittent    Progression:  Waxing and waning    Chronicity:  New  Wheezing:     Severity:  Mild    Onset quality:  Gradual    Timing:  Intermittent Progression:  Waxing and waning    Chronicity:  New  Risk factors: no recent alcohol use, no family hx of DVT, no hx of cancer, no hx of PE/DVT, no obesity, no oral contraceptive use, no prolonged immobilization, no recent surgery and no tobacco use        Prior to Admission Medications   Prescriptions Last Dose Informant Patient Reported? Taking? Cetirizine HCl (ZYRTEC ALLERGY) 10 MG CAPS   Yes Yes   Sig: Take 10 mg by mouth   Cholecalciferol (VITAMIN D3) 50 MCG (2000 UT) capsule   Yes Yes   Sig: Take 1 tablet by mouth daily   EPINEPHrine (EPIPEN) 0 3 mg/0 3 mL SOAJ   Yes Yes   Sig: As directed   SYNTHROID 112 MCG tablet   Yes No   Sig: every other day    SYNTHROID 125 MCG tablet   Yes No   Sig: every other day    aspirin 81 mg chewable tablet   Yes Yes   Sig: one tablet by mouth once daily   atorvastatin (LIPITOR) 40 mg tablet   Yes Yes   Sig: TAKE ONE (1) TABLET DAILY  diltiazem (CARDIZEM CD) 180 mg 24 hr capsule   Yes No   isosorbide mononitrate (IMDUR) 60 mg 24 hr tablet   Yes No   nitroglycerin (NITROSTAT) 0 3 mg SL tablet   Yes Yes   Sig: Place 0 3 mg under the tongue   olopatadine (PATANOL) 0 1 % ophthalmic solution   Yes Yes   Sig: Apply 1 drop to eye 2 (two) times a day   triamterene-hydrochlorothiazide (MAXZIDE-25) 37 5-25 mg per tablet   Yes No      Facility-Administered Medications: None       Past Medical History:   Diagnosis Date    Hypertension     Neck pain     Pneumohemothorax     left       Past Surgical History:   Procedure Laterality Date    HAND SURGERY Bilateral        History reviewed  No pertinent family history  I have reviewed and agree with the history as documented  Social History     Tobacco Use    Smoking status: Never Smoker    Smokeless tobacco: Never Used   Substance Use Topics    Alcohol use: Never     Frequency: Never    Drug use: Never        Review of Systems   Constitutional: Negative  Negative for diaphoresis and fever  HENT: Negative    Negative for ear pain and sore throat  Eyes: Negative  Negative for visual disturbance  Respiratory: Positive for cough, sputum production, shortness of breath and wheezing  Negative for hemoptysis and chest tightness  Cardiovascular: Negative  Negative for chest pain, palpitations, claudication, syncope and PND  Gastrointestinal: Negative  Negative for abdominal pain, nausea and vomiting  Endocrine: Negative  Genitourinary: Negative  Negative for difficulty urinating and dysuria  Musculoskeletal: Negative  Negative for back pain and neck pain  Skin: Negative for rash  Neurological: Negative for weakness and headaches  Hematological: Negative  Psychiatric/Behavioral: Negative  Negative for confusion  All other systems reviewed and are negative  Physical Exam  Physical Exam   Constitutional: She is oriented to person, place, and time  She appears well-developed and well-nourished  No distress  HENT:   Head: Normocephalic and atraumatic  Mouth/Throat: Oropharynx is clear and moist    Patient maintaining airway maintaining secretions  Eyes: Pupils are equal, round, and reactive to light  Conjunctivae and EOM are normal    Neck: Normal range of motion  Neck supple  Cardiovascular: Normal rate, regular rhythm, normal heart sounds and intact distal pulses  No murmur heard  Pulses:       Radial pulses are 2+ on the right side, and 2+ on the left side  Dorsalis pedis pulses are 2+ on the right side, and 2+ on the left side  Pulmonary/Chest: Effort normal  No stridor  No respiratory distress  She has wheezes in the right lower field, the left middle field and the left lower field  No dyspnea on exertion  No conversational dyspnea  Abdominal: Soft  Bowel sounds are normal  She exhibits no distension  There is no tenderness  Musculoskeletal: Normal range of motion  She exhibits no edema  Right lower leg: Normal  She exhibits no edema          Left lower leg: Normal  She exhibits no edema  Neurological: She is alert and oriented to person, place, and time  No cranial nerve deficit  GCS 15  No slurred speech  No facial asymmetry  No ataxia  Skin: Skin is warm  Capillary refill takes less than 2 seconds  She is not diaphoretic  Nursing note and vitals reviewed  Vital Signs  ED Triage Vitals [12/12/19 0459]   Temperature Pulse Respirations Blood Pressure SpO2   (!) 97 2 °F (36 2 °C) 76 18 (!) 177/77 92 %      Temp src Heart Rate Source Patient Position - Orthostatic VS BP Location FiO2 (%)   -- -- Sitting Right arm --      Pain Score       No Pain           Vitals:    12/12/19 0459 12/12/19 0530   BP: (!) 177/77 132/62   Pulse: 76 80   Patient Position - Orthostatic VS: Sitting Sitting         Visual Acuity      ED Medications  Medications   azithromycin (ZITHROMAX) 500 mg in sodium chloride 0 9 % 250 mL IVPB (has no administration in time range)   cefTRIAXone (ROCEPHIN) IVPB (premix) 2,000 mg (2,000 mg Intravenous New Bag 12/12/19 0643)   sodium chloride 0 9 % bolus 1,000 mL (1,000 mL Intravenous New Bag 12/12/19 0519)   ipratropium-albuterol (DUO-NEB) 0 5-2 5 mg/3 mL inhalation solution 3 mL (3 mL Nebulization Given 12/12/19 0520)   methylPREDNISolone sodium succinate (Solu-MEDROL) injection 60 mg (60 mg Intravenous Given 12/12/19 0521)       Diagnostic Studies  Results Reviewed     Procedure Component Value Units Date/Time    Blood culture #1 [[id number]] Collected:  12/12/19 0637    Lab Status: In process Specimen:  Blood from Arm, Left Updated:  12/12/19 0641    Blood culture #2 [[id number]] Collected:  12/12/19 9653    Lab Status: In process Specimen:  Blood from Arm, Right Updated:  12/12/19 0641    Procalcitonin [[id number]] Collected:  12/12/19 2164    Lab Status: In process Specimen:  Blood from Arm, Left Updated:  12/12/19 0641    Mycoplasma Pneumoniae AB, IgG/IgM [[id number]] Collected:  12/12/19 0659    Lab Status:   In process Specimen:  Blood from Arm, Left Updated:  12/12/19 0641    Strep Pneumoniae, Urine [[id number]]     Lab Status:  No result Specimen:  Urine, Clean Catch     Influenza A/B and RSV PCR [[id number]]  (Normal) Collected:  12/12/19 0519    Lab Status:  Final result Specimen:  Nares from Nasopharyngeal Swab Updated:  12/12/19 0603     INFLUENZA A PCR None Detected     INFLUENZA B PCR None Detected     RSV PCR None Detected    Lactic acid, plasma [[id number]]  (Normal) Collected:  12/12/19 0511    Lab Status:  Final result Specimen:  Blood from Arm, Right Updated:  12/12/19 0542     LACTIC ACID 0 7 mmol/L     Narrative:       Result may be elevated if tourniquet was used during collection      Troponin I [[id number]]  (Normal) Collected:  12/12/19 0511    Lab Status:  Final result Specimen:  Blood from Arm, Right Updated:  12/12/19 0542     Troponin I <0 02 ng/mL     Comprehensive metabolic panel [[id number]]  (Abnormal) Collected:  12/12/19 0511    Lab Status:  Final result Specimen:  Blood from Arm, Right Updated:  12/12/19 0539     Sodium 138 mmol/L      Potassium 3 7 mmol/L      Chloride 102 mmol/L      CO2 33 mmol/L      ANION GAP 3 mmol/L      BUN 21 mg/dL      Creatinine 1 03 mg/dL      Glucose 110 mg/dL      Calcium 8 9 mg/dL      AST 20 U/L      ALT 33 U/L      Alkaline Phosphatase 152 U/L      Total Protein 7 2 g/dL      Albumin 3 7 g/dL      Total Bilirubin 0 50 mg/dL      eGFR 53 ml/min/1 73sq m     Narrative:       Desiree guidelines for Chronic Kidney Disease (CKD):     Stage 1 with normal or high GFR (GFR > 90 mL/min/1 73 square meters)    Stage 2 Mild CKD (GFR = 60-89 mL/min/1 73 square meters)    Stage 3A Moderate CKD (GFR = 45-59 mL/min/1 73 square meters)    Stage 3B Moderate CKD (GFR = 30-44 mL/min/1 73 square meters)    Stage 4 Severe CKD (GFR = 15-29 mL/min/1 73 square meters)    Stage 5 End Stage CKD (GFR <15 mL/min/1 73 square meters)  Note: GFR calculation is accurate only with a steady state creatinine Magnesium [[id number]]  (Normal) Collected:  12/12/19 0511    Lab Status:  Final result Specimen:  Blood from Arm, Right Updated:  12/12/19 0533     Magnesium 1 8 mg/dL     CBC and differential [[id number]]  (Abnormal) Collected:  12/12/19 0511    Lab Status:  Final result Specimen:  Blood from Arm, Right Updated:  12/12/19 0519     WBC 16 61 Thousand/uL      RBC 4 27 Million/uL      Hemoglobin 13 6 g/dL      Hematocrit 39 6 %      MCV 93 fL      MCH 31 9 pg      MCHC 34 3 g/dL      RDW 12 4 %      MPV 8 4 fL      Platelets 460 Thousands/uL      nRBC 0 /100 WBCs      Neutrophils Relative 47 %      Immat GRANS % 0 %      Lymphocytes Relative 12 %      Monocytes Relative 8 %      Eosinophils Relative 33 %      Basophils Relative 0 %      Neutrophils Absolute 7 73 Thousands/µL      Immature Grans Absolute 0 05 Thousand/uL      Lymphocytes Absolute 2 06 Thousands/µL      Monocytes Absolute 1 30 Thousand/µL      Eosinophils Absolute 5 40 Thousand/µL      Basophils Absolute 0 07 Thousands/µL                  CT chest without contrast    (Results Pending)              Procedures  Procedures         ED Course  ED Course as of Dec 12 0655   Thu Dec 12, 2019   0503 Patient is a 53-year-old female coming in today with complaints of URI as well as cough  On exam she is nontoxic appearing in no acute distress and no dyspnea on exertion  Will check EKG, CBC CMP lactic troponin as well as chest x-ray  She does have wheezing will provide DuoNeb as well  Portions of the record may have been created with voice recognition software  Occasional wrong word or "sound a like" substitutions may have occurred due to the inherent limitations of voice recognition software  Read the chart carefully and recognize, using context, where substitutions have occurred  4173 Patient does have leukocytosis however stable creatinine and electrolytes  She has no bands    Will change chest x-ray to CT without      0543 Heart score 4 with negative troponin  I do feel this is more infectious etiology  Will read vital as patient has gone to CT at this time  2441 Flu and RSV negative  Pending CT       0623 Having difficulty time reviewing his CT as PACs is down at this time  I went over to the machine and reviewed the CT myself  Will also be sent to [facility] rads  It is noted patient does have bilateral pneumonia  This is updated to patient and will need admission  Concern for underlying pathology as well  Will cover for CAP      4470 Discussed with Dr Osman Wylie  We had a detailed discussion of the patient's condition and case,  including need for admission  Accepts to his/her service  Bed request/bridging orders placed  2428 Per Vrad: "There is emphysema  There is approximately 45 x 59 x 47 mm lobulated spiculated mass in the right  upper lobe  There are innumerable bilateral subcentimeter pulmonary nodules  There is a 25 mm left  upper lobe pulmonary mass  There is a 16mm lingular mass  There is mediastinal adenopathy  Constellation of findings suggests malignancy with metastases  Clinically exclude a superimposed  Pneumonia "        3677 Patient and family updated on official CT read  HEART Risk Score      Most Recent Value   History  0 Filed at: 12/12/2019 0543   ECG  0 Filed at: 12/12/2019 0543   Age  2 Filed at: 12/12/2019 0543   Risk Factors  2 Filed at: 12/12/2019 0543   Troponin  0 Filed at: 12/12/2019 0543   Heart Score Risk Calculator   History  0 Filed at: 12/12/2019 0543   ECG  0 Filed at: 12/12/2019 0543   Age  2 Filed at: 12/12/2019 0543   Risk Factors  2 Filed at: 12/12/2019 0543   Troponin  0 Filed at: 12/12/2019 0543   HEART Score  4 Filed at: 12/12/2019 0543   HEART Score  4 Filed at: 12/12/2019 0543                            MDM  Number of Diagnoses or Management Options  Cough:   Dyspnea:   Elevated blood pressure reading:   Diagnosis management comments:     EKG INTERPRETATION at 5:07 a m    RHYTHM: Normal sinus rhythm at 70 beats per minute  AXIS:  Normal axis  INTERVALS:  IA interval measured at 196 milliseconds  QRS COMPLEX:  QRS measured at 100 milliseconds  ST SEGMENT:  Nonspecific ST segment changes  Diffuse artifact  QT INTERVAL:  QTC measured at 452 milliseconds  COMPARED WITH PRIOR no old to compare  Interpretation by Donta Barnett DO    Differential diagnosis includes but not limited to:  COPD exacerbation, asthma exacerbation, pneumonia, PE, pulmonary edema, pulmonary effusions, lung mass/malignancy, CHF, ACS, NSTEMI, acute kidney injury, electrolyte dysfunction, inhalation injury, anemia, valvular disorder, viral etiology,         Amount and/or Complexity of Data Reviewed  Clinical lab tests: ordered and reviewed  Tests in the radiology section of CPT®: ordered and reviewed  Tests in the medicine section of CPT®: ordered and reviewed  Independent visualization of images, tracings, or specimens: yes          Disposition  Final diagnoses:   Dyspnea   Cough   Elevated blood pressure reading   Bilateral pneumonia   Hypoxia   Lung mass     Time reflects when diagnosis was documented in both MDM as applicable and the Disposition within this note     Time User Action Codes Description Comment    12/12/2019  5:16 AM Trisha Ureña L Add [R06 00] Dyspnea     12/12/2019  5:16 AM Allie Ureña L Add [R05] Cough     12/12/2019  5:16 AM Allie Ureña L Add [R03 0] Elevated blood pressure reading     12/12/2019  6:26 AM Trisha Ureña L Add [J18 9] Bilateral pneumonia     12/12/2019  6:26 AM Maribell Bui L Add [R09 02] Hypoxia     12/12/2019  6:45 AM Ondina Ureña Add [R91 8] Lung mass       ED Disposition     ED Disposition Condition Date/Time Comment    Admit Stable Thu Dec 12, 2019  6:26 AM Case was discussed with Dr Mary Payan and the patient's admission status was agreed to be Admission Status: inpatient status to the service of Dr Mary Payan           Follow-up Information    None         Patient's Medications   Discharge Prescriptions    No medications on file     No discharge procedures on file      ED Provider  Electronically Signed by           Sara Rod DO  12/12/19 3943

## 2019-12-12 NOTE — RESPIRATORY THERAPY NOTE
RT Protocol Note  River Serrano 70 y o  female MRN: [id number]  Unit/Bed#: 416-01 Encounter: [id number]    Assessment    Active Problems:    Abnormal CT of the chest    Shortness of breath    Hypothyroidism      Home Pulmonary Medications:  none       Past Medical History:   Diagnosis Date    Hypertension     Neck pain     Pneumohemothorax     left     Social History     Socioeconomic History    Marital status: /Civil Union     Spouse name: None    Number of children: None    Years of education: None    Highest education level: None   Occupational History    None   Social Needs    Financial resource strain: None    Food insecurity:     Worry: None     Inability: None    Transportation needs:     Medical: None     Non-medical: None   Tobacco Use    Smoking status: Never Smoker    Smokeless tobacco: Never Used   Substance and Sexual Activity    Alcohol use: Never     Frequency: Never    Drug use: Never    Sexual activity: None   Lifestyle    Physical activity:     Days per week: None     Minutes per session: None    Stress: None   Relationships    Social connections:     Talks on phone: None     Gets together: None     Attends [employer] service: None     Active member of club or organization: None     Attends meetings of clubs or organizations: None     Relationship status: None    Intimate partner violence:     Fear of current or ex partner: None     Emotionally abused: None     Physically abused: None     Forced sexual activity: None   Other Topics Concern    None   Social History Narrative    None       Subjective         Objective    Physical Exam:   Assessment Type: Pre-treatment  General Appearance: Awake, Alert  Chest Assessment: Chest expansion symmetrical  Bilateral Breath Sounds: Diminished, Coarse    Vitals:  Blood pressure (!) 183/79, pulse 90, temperature 97 8 °F (36 6 °C), resp  rate 22, height 5' 2" (1 575 m), weight 69 7 kg (153 lb 10 6 oz), SpO2 97 %            Imaging and other studies: I have personally reviewed pertinent reports  TID- duoneb  Q4PRN albuterol for SOB/ Wheezing       Plan    Respiratory Plan: Mild Distress pathway

## 2019-12-12 NOTE — ASSESSMENT & PLAN NOTE
2-3 worsening shortness of breath  Differential URI, malignancy, pneumonia  X-ray shows pneumonia, no evidence of pneumonia on CT  Will continue antibiotics for now  Respiratory protocol  incentive spirometry  Trend WBC, pro suzan, cultures

## 2019-12-13 ENCOUNTER — APPOINTMENT (OUTPATIENT)
Dept: PHYSICAL THERAPY | Facility: CLINIC | Age: 79
End: 2019-12-13
Payer: COMMERCIAL

## 2019-12-13 ENCOUNTER — APPOINTMENT (INPATIENT)
Dept: CT IMAGING | Facility: HOSPITAL | Age: 79
DRG: 180 | End: 2019-12-13
Payer: COMMERCIAL

## 2019-12-13 VITALS
RESPIRATION RATE: 16 BRPM | HEIGHT: 62 IN | HEART RATE: 80 BPM | OXYGEN SATURATION: 94 % | TEMPERATURE: 97.9 F | DIASTOLIC BLOOD PRESSURE: 59 MMHG | BODY MASS INDEX: 28.28 KG/M2 | SYSTOLIC BLOOD PRESSURE: 124 MMHG | WEIGHT: 153.66 LBS

## 2019-12-13 LAB
ALBUMIN SERPL BCP-MCNC: 3.8 G/DL (ref 3.5–5)
ALP SERPL-CCNC: 152 U/L (ref 46–116)
ALT SERPL W P-5'-P-CCNC: 47 U/L (ref 12–78)
ANION GAP SERPL CALCULATED.3IONS-SCNC: 9 MMOL/L (ref 4–13)
AST SERPL W P-5'-P-CCNC: 20 U/L (ref 5–45)
ATRIAL RATE: 71 BPM
BILIRUB SERPL-MCNC: 0.3 MG/DL (ref 0.2–1)
BUN SERPL-MCNC: 25 MG/DL (ref 5–25)
CALCIUM SERPL-MCNC: 9.5 MG/DL (ref 8.3–10.1)
CANCER AG19-9 SERPL-ACNC: 11 U/ML (ref 0–35)
CHLORIDE SERPL-SCNC: 104 MMOL/L (ref 100–108)
CO2 SERPL-SCNC: 30 MMOL/L (ref 21–32)
CREAT SERPL-MCNC: 1.02 MG/DL (ref 0.6–1.3)
ERYTHROCYTE [DISTWIDTH] IN BLOOD BY AUTOMATED COUNT: 12.7 % (ref 11.6–15.1)
GFR SERPL CREATININE-BSD FRML MDRD: 53 ML/MIN/1.73SQ M
GLUCOSE SERPL-MCNC: 164 MG/DL (ref 65–140)
HCT VFR BLD AUTO: 40 % (ref 34.8–46.1)
HGB BLD-MCNC: 13.7 G/DL (ref 11.5–15.4)
M PNEUMO IGG SER IA-ACNC: <100 U/ML (ref 0–99)
M PNEUMO IGM SER IA-ACNC: <770 U/ML (ref 0–769)
MCH RBC QN AUTO: 32.1 PG (ref 26.8–34.3)
MCHC RBC AUTO-ENTMCNC: 34.3 G/DL (ref 31.4–37.4)
MCV RBC AUTO: 94 FL (ref 82–98)
P AXIS: 62 DEGREES
PLATELET # BLD AUTO: 323 THOUSANDS/UL (ref 149–390)
PMV BLD AUTO: 8.6 FL (ref 8.9–12.7)
POTASSIUM SERPL-SCNC: 4 MMOL/L (ref 3.5–5.3)
PR INTERVAL: 196 MS
PROCALCITONIN SERPL-MCNC: 0.06 NG/ML
PROT SERPL-MCNC: 7.7 G/DL (ref 6.4–8.2)
QRS AXIS: 50 DEGREES
QRSD INTERVAL: 100 MS
QT INTERVAL: 416 MS
QTC INTERVAL: 452 MS
RBC # BLD AUTO: 4.27 MILLION/UL (ref 3.81–5.12)
SODIUM SERPL-SCNC: 143 MMOL/L (ref 136–145)
T WAVE AXIS: 46 DEGREES
VENTRICULAR RATE: 71 BPM
WBC # BLD AUTO: 24.66 THOUSAND/UL (ref 4.31–10.16)

## 2019-12-13 PROCEDURE — 99239 HOSP IP/OBS DSCHRG MGMT >30: CPT | Performed by: FAMILY MEDICINE

## 2019-12-13 PROCEDURE — 77012 CT SCAN FOR NEEDLE BIOPSY: CPT | Performed by: RADIOLOGY

## 2019-12-13 PROCEDURE — 99231 SBSQ HOSP IP/OBS SF/LOW 25: CPT | Performed by: INTERNAL MEDICINE

## 2019-12-13 PROCEDURE — 32405 HB PERCUT BX LUNG/MEDIASTINUM: CPT

## 2019-12-13 PROCEDURE — 88341 IMHCHEM/IMCYTCHM EA ADD ANTB: CPT | Performed by: PATHOLOGY

## 2019-12-13 PROCEDURE — 85027 COMPLETE CBC AUTOMATED: CPT | Performed by: STUDENT IN AN ORGANIZED HEALTH CARE EDUCATION/TRAINING PROGRAM

## 2019-12-13 PROCEDURE — 88342 IMHCHEM/IMCYTCHM 1ST ANTB: CPT | Performed by: PATHOLOGY

## 2019-12-13 PROCEDURE — 94640 AIRWAY INHALATION TREATMENT: CPT

## 2019-12-13 PROCEDURE — 77012 CT SCAN FOR NEEDLE BIOPSY: CPT

## 2019-12-13 PROCEDURE — 99024 POSTOP FOLLOW-UP VISIT: CPT | Performed by: RADIOLOGY

## 2019-12-13 PROCEDURE — 80053 COMPREHEN METABOLIC PANEL: CPT | Performed by: STUDENT IN AN ORGANIZED HEALTH CARE EDUCATION/TRAINING PROGRAM

## 2019-12-13 PROCEDURE — 88305 TISSUE EXAM BY PATHOLOGIST: CPT | Performed by: PATHOLOGY

## 2019-12-13 PROCEDURE — 99223 1ST HOSP IP/OBS HIGH 75: CPT | Performed by: INTERNAL MEDICINE

## 2019-12-13 PROCEDURE — 93010 ELECTROCARDIOGRAM REPORT: CPT | Performed by: INTERNAL MEDICINE

## 2019-12-13 PROCEDURE — 32405 PR BIOPSY LUNG/MEDIASTINUM PERCUTANEOUS NEEDLE: CPT | Performed by: RADIOLOGY

## 2019-12-13 PROCEDURE — 84145 PROCALCITONIN (PCT): CPT | Performed by: FAMILY MEDICINE

## 2019-12-13 PROCEDURE — 94760 N-INVAS EAR/PLS OXIMETRY 1: CPT

## 2019-12-13 PROCEDURE — 0BBC3ZX EXCISION OF RIGHT UPPER LUNG LOBE, PERCUTANEOUS APPROACH, DIAGNOSTIC: ICD-10-PCS | Performed by: RADIOLOGY

## 2019-12-13 RX ORDER — ALBUTEROL SULFATE 90 UG/1
2 AEROSOL, METERED RESPIRATORY (INHALATION) EVERY 4 HOURS PRN
Qty: 1 INHALER | Refills: 0 | Status: SHIPPED | OUTPATIENT
Start: 2019-12-13

## 2019-12-13 RX ORDER — LEVOFLOXACIN 500 MG/1
500 TABLET, FILM COATED ORAL EVERY 24 HOURS
Qty: 3 TABLET | Refills: 0 | Status: SHIPPED | OUTPATIENT
Start: 2019-12-13 | End: 2019-12-16

## 2019-12-13 RX ORDER — FENTANYL CITRATE 50 UG/ML
INJECTION, SOLUTION INTRAMUSCULAR; INTRAVENOUS CODE/TRAUMA/SEDATION MEDICATION
Status: COMPLETED | OUTPATIENT
Start: 2019-12-13 | End: 2019-12-13

## 2019-12-13 RX ORDER — PREDNISONE 20 MG/1
40 TABLET ORAL DAILY
Status: DISCONTINUED | OUTPATIENT
Start: 2019-12-14 | End: 2019-12-13 | Stop reason: HOSPADM

## 2019-12-13 RX ORDER — LIDOCAINE HYDROCHLORIDE 10 MG/ML
INJECTION, SOLUTION EPIDURAL; INFILTRATION; INTRACAUDAL; PERINEURAL CODE/TRAUMA/SEDATION MEDICATION
Status: COMPLETED | OUTPATIENT
Start: 2019-12-13 | End: 2019-12-13

## 2019-12-13 RX ORDER — FLUTICASONE PROPIONATE 50 MCG
1 SPRAY, SUSPENSION (ML) NASAL DAILY
Status: DISCONTINUED | OUTPATIENT
Start: 2019-12-13 | End: 2019-12-13 | Stop reason: HOSPADM

## 2019-12-13 RX ADMIN — DILTIAZEM HYDROCHLORIDE 180 MG: 180 CAPSULE, COATED, EXTENDED RELEASE ORAL at 08:06

## 2019-12-13 RX ADMIN — LEVOFLOXACIN 750 MG: 750 INJECTION, SOLUTION INTRAVENOUS at 06:03

## 2019-12-13 RX ADMIN — ENOXAPARIN SODIUM 40 MG: 40 INJECTION SUBCUTANEOUS at 11:45

## 2019-12-13 RX ADMIN — IPRATROPIUM BROMIDE AND ALBUTEROL SULFATE 3 ML: 2.5; .5 SOLUTION RESPIRATORY (INHALATION) at 08:50

## 2019-12-13 RX ADMIN — TRIAMTERENE AND HYDROCHLOROTHIAZIDE 1 TABLET: 37.5; 25 TABLET ORAL at 08:07

## 2019-12-13 RX ADMIN — FLUTICASONE PROPIONATE 1 SPRAY: 50 SPRAY, METERED NASAL at 13:40

## 2019-12-13 RX ADMIN — FENTANYL CITRATE 50 MCG: 50 INJECTION, SOLUTION INTRAMUSCULAR; INTRAVENOUS at 10:58

## 2019-12-13 RX ADMIN — METHYLPREDNISOLONE SODIUM SUCCINATE 40 MG: 40 INJECTION, POWDER, FOR SOLUTION INTRAMUSCULAR; INTRAVENOUS at 08:07

## 2019-12-13 RX ADMIN — LIDOCAINE HYDROCHLORIDE 10 ML: 10 INJECTION, SOLUTION EPIDURAL; INFILTRATION; INTRACAUDAL; PERINEURAL at 10:48

## 2019-12-13 NOTE — NURSING NOTE
Discharge instructions given to patient  Learning verbally expressed  Patient in no apparent distress upon discharge

## 2019-12-13 NOTE — RESPIRATORY THERAPY NOTE
TID duonebs discontinued due to pt assessment with clear BS and SpO2 of 95% on RA  Pt does not use any respiratory medications or O2 at home  PRN remains

## 2019-12-13 NOTE — PROGRESS NOTES
Progress Note - Kilo  1940, 70 y o  female MRN: [id number]    Unit/Bed#: 416-01 Encounter: [id number]    Primary Care Provider: Beth Shin MD   Date and time admitted to hospital: 12/12/2019  4:57 AM        * Abnormal CT of the chest  Assessment & Plan  Ct chest concerning for metastatic disease  CT Head/Abd/Pelvic to locate source  Tumor markers, CEA, AFP, HCG,  negative  Onc consult andPulmonary   Patient to have IR lung biopsy today with Dr Richy Campa  NPO after midnight      SIRS (systemic inflammatory response syndrome) (HCC)  Assessment & Plan  2-3 worsening shortness of breath  Differential URI, malignancy, post obstructive pneumonia  X-ray shows pneumonia, no evidence of pneumonia on CT  Will continue antibiotics for now, Levoquin  Respiratory protocol  incentive spirometry    Trend WBC, pro suzan, cultures    Hypothyroidism  Assessment & Plan  Stable on home meds  Continue home dose medication          Subjective:   Patient seen examined at bedside in no acute distress, denies any acute overnight events however states she has had several episodes of cough and with improvement after nebulizer treatment  She is looking forward to lung biopsy, and having a definitive answer to what may be going on in her chest   She denies any new issues/concerns, no questions at time of exam  Objective:     Vitals: Blood pressure 165/87, pulse (!) 106, temperature 98 1 °F (36 7 °C), resp  rate 14, height 5' 2" (1 575 m), weight 69 7 kg (153 lb 10 6 oz), SpO2 94 %  ,Body mass index is 28 1 kg/m²  Wt Readings from Last 3 Encounters:   12/12/19 69 7 kg (153 lb 10 6 oz)       Intake/Output Summary (Last 24 hours) at 12/13/2019 8312  Last data filed at 12/12/2019 1409  Gross per 24 hour   Intake 1730 ml   Output    Net 1730 ml       Physical Exam: Physical Exam   Constitutional: She is oriented to person, place, and time  She appears well-developed and well-nourished  No distress     HENT:   Head: Normocephalic and atraumatic  Eyes: Pupils are equal, round, and reactive to light  Conjunctivae and EOM are normal    Neck: Normal range of motion  Neck supple  Cardiovascular: Normal rate, regular rhythm and normal heart sounds  Exam reveals no friction rub  No murmur heard  Pulmonary/Chest: Effort normal and breath sounds normal    Examination done immediately after nebulizer treatment, will reassess in few hours  Abdominal: Soft  Bowel sounds are normal    Musculoskeletal: Normal range of motion  She exhibits no edema  Neurological: She is alert and oriented to person, place, and time  She exhibits normal muscle tone  Coordination normal    Skin: Skin is warm  Capillary refill takes less than 2 seconds  Psychiatric: She has a normal mood and affect   Her behavior is normal  Thought content normal         Recent Results (from the past 24 hour(s))   Strep Pneumoniae, Urine    Collection Time: 12/12/19 10:16 AM   Result Value Ref Range    Strep pneumoniae antigen, urine Negative Negative   CEA    Collection Time: 12/12/19 10:16 AM   Result Value Ref Range    CEA 1 0 0 0 - 3 0 ng/mL   AFP tumor marker    Collection Time: 12/12/19 10:16 AM   Result Value Ref Range    AFP TUMOR MARKER 3 1 0 5 - 8 ng/mL   HCG, tumor marker    Collection Time: 12/12/19 10:16 AM   Result Value Ref Range    hCG Tumor Marker 2 mlU/mL   Platelet count    Collection Time: 12/12/19 10:16 AM   Result Value Ref Range    Platelets 326 095 - 766 Thousands/uL    MPV 8 5 (L) 8 9 - 12 7 fL   CBC (With Platelets)    Collection Time: 12/13/19  5:18 AM   Result Value Ref Range    WBC 24 66 (H) 4 31 - 10 16 Thousand/uL    RBC 4 27 3 81 - 5 12 Million/uL    Hemoglobin 13 7 11 5 - 15 4 g/dL    Hematocrit 40 0 34 8 - 46 1 %    MCV 94 82 - 98 fL    MCH 32 1 26 8 - 34 3 pg    MCHC 34 3 31 4 - 37 4 g/dL    RDW 12 7 11 6 - 15 1 %    Platelets 782 715 - 405 Thousands/uL    MPV 8 6 (L) 8 9 - 12 7 fL   Comprehensive metabolic panel    Collection Time: 12/13/19  5:18 AM   Result Value Ref Range    Sodium 143 136 - 145 mmol/L    Potassium 4 0 3 5 - 5 3 mmol/L    Chloride 104 100 - 108 mmol/L    CO2 30 21 - 32 mmol/L    ANION GAP 9 4 - 13 mmol/L    BUN 25 5 - 25 mg/dL    Creatinine 1 02 0 60 - 1 30 mg/dL    Glucose 164 (H) 65 - 140 mg/dL    Calcium 9 5 8 3 - 10 1 mg/dL    AST 20 5 - 45 U/L    ALT 47 12 - 78 U/L    Alkaline Phosphatase 152 (H) 46 - 116 U/L    Total Protein 7 7 6 4 - 8 2 g/dL    Albumin 3 8 3 5 - 5 0 g/dL    Total Bilirubin 0 30 0 20 - 1 00 mg/dL    eGFR 53 ml/min/1 73sq m       Current Facility-Administered Medications   Medication Dose Route Frequency Provider Last Rate Last Dose    acetaminophen (TYLENOL) tablet 650 mg  650 mg Oral Q6H PRN Obdulia Ceron MD   650 mg at 12/12/19 1834    albuterol inhalation solution 2 5 mg  2 5 mg Nebulization Q4H PRN Obdulia Ceron MD        aspirin chewable tablet 81 mg  81 mg Oral Daily Obdulia Ceron MD   81 mg at 12/12/19 1014    atorvastatin (LIPITOR) tablet 40 mg  40 mg Oral QPM Obdulia Ceron MD   40 mg at 12/12/19 1834    balanced salt solution (BSS) intraocular irrigation solution 15 mL  15 mL Both Eyes BID Obdulia Ceron MD        cholecalciferol (VITAMIN D3) tablet 400 Units  400 Units Oral Daily Obdulia Ceron MD   400 Units at 12/12/19 1013    diltiazem (CARDIZEM CD) 24 hr capsule 180 mg  180 mg Oral Daily Obdulia Ceron MD   180 mg at 12/12/19 1012    enoxaparin (LOVENOX) subcutaneous injection 40 mg  40 mg Subcutaneous Daily Obdulia Ceron MD   40 mg at 12/12/19 1014    hydrALAZINE (APRESOLINE) injection 10 mg  10 mg Intravenous Q6H PRN Mathieu Platt MD        ipratropium-albuterol (DUO-NEB) 0 5-2 5 mg/3 mL inhalation solution 3 mL  3 mL Nebulization TID Obdulia Ceron MD   3 mL at 12/12/19 2035    levofloxacin (LEVAQUIN) IVPB (premix) 750 mg  750 mg Intravenous Q48H Mary Reardon  mL/hr at 12/13/19 0603 750 mg at 12/13/19 0603    levothyroxine tablet 125 mcg  125 mcg Oral Every Other Day Camelia Ibarra MD   125 mcg at 12/12/19 1021    loratadine (CLARITIN) tablet 10 mg  10 mg Oral Daily Camelia Ibarra MD        methylPREDNISolone sodium succinate (Solu-MEDROL) injection 40 mg  40 mg Intravenous Q12H [address] Brionna Tee MD   40 mg at 12/12/19 2039    triamterene-hydrochlorothiazide (MAXZIDE-25) 37 5-25 mg per tablet 1 tablet  1 tablet Oral Daily Camelia Ibarra MD   1 tablet at 12/12/19 1014       Invasive Devices     Peripheral Intravenous Line            Peripheral IV 12/12/19 Right Antecubital 1 day                Lab, Imaging and other studies: I have personally reviewed pertinent reports          Camelia Ibarra MD 7:09 AM  12/13/19

## 2019-12-13 NOTE — UTILIZATION REVIEW
Notification of Inpatient Admission/Inpatient Authorization Request   This is a Notification of Inpatient Admission for [address]  Be advised that this patient was admitted to our facility under Inpatient Status  Contact Rajiv Cabral at [phone] for additional admission information  Bruce Rose  DEPT  DEDICATED -[phone]  Patient Name:   Cruz Morrison   YOB: 1940       [address]   [address]:   [address]  Tax ID: [id number]  NPI: [id number] Attending Provider/NPI: Jose L Quintana Md [[id number]]   Place of Service Code: 24     Place of Service Name:  [address] [place] [address]   Start Date: 12/12/19 7793     Discharge Date & Time: No discharge date for patient encounter  Type of Admission: Inpatient Status Discharge Disposition   (if discharged): Final discharge disposition not confirmed   Patient Diagnoses: Cough [R05]  Lung mass [R91 8]  Elevated blood pressure reading [R03 0]  Dyspnea [R06 00]  SOB (shortness of breath) [R06 02]  Hypoxia [R09 02]  Bilateral pneumonia [J18 9]     Orders: Admission Orders (From admission, onward)     Ordered        12/12/19 0629  Inpatient Admission (expected length of stay for this patient Order details is greater than two midnights)  Once                    Assigned Utilization Review Contact: Rajiv Cabral  Utilization   Network Utilization Review Department  Phone: [phone]; Fax [phone]  Email: [email or web address]   ATTENTION PAYERS: Please call the assigned Utilization  directly with any questions or concerns ALL voicemails in the department are confidential  Send all requests for admission clinical reviews, approved or denied determinations and any other requests to dedicated fax number belonging to the campus where the patient is receiving treatment

## 2019-12-13 NOTE — UTILIZATION REVIEW
Initial Clinical Review    Admission: Date/Time/Statement: Inpatient Admission Orders (From admission, onward)     Ordered        12/12/19 0629  Inpatient Admission (expected length of stay for this patient Order details is greater than two midnights)  Once                   Orders Placed This Encounter   Procedures    Inpatient Admission (expected length of stay for this patient Order details is greater than two midnights)     Standing Status:   Standing     Number of Occurrences:   1     Order Specific Question:   Admitting Physician     Answer:   Fernande Cushing [[id number]]     Order Specific Question:   Level of Care     Answer:   Med Surg [16]     Order Specific Question:   Estimated length of stay     Answer:   More than 2 Midnights     Order Specific Question:   Certification     Answer:   I certify that inpatient services are medically necessary for this patient for a duration of greater than two midnights  See H&P and MD Progress Notes for additional information about the patient's course of treatment  ED Arrival Information     Expected Arrival Acuity Means of Arrival Escorted By Service Admission Type    - 12/12/2019 04:55 Urgent Walk-In Family Member Hospitalist Urgent    Arrival Complaint    Shortness of Breath        Chief Complaint   Patient presents with    Shortness of Breath     c/o SOB x2 days  also c/o cough and congestion  Assessment/Plan:   70-year-old female to ER from home c/o SOB  Hx hypertension, hyperlipidemia, coronary artery disease, 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  She states that she has had URI signs symptoms for the past 2 days, now worsening coughing, productive cough of yellow greenish sputum  presents hypertensive, SOB with scattered wheezes, scattered fine rhonchi, diffusely decreased  Admission work-up showing leukocytosis,  pneumonia vs neoplasm  Admitted to inpatient status for abnormal CT chest with sob, bilateral pneumonia   Started on ivabt & iv steroids  Oncology & pulmonary consulted, scheduled for lung bx     ED Triage Vitals   Temperature Pulse Respirations Blood Pressure SpO2   12/12/19 0459 12/12/19 0459 12/12/19 0459 12/12/19 0459 12/12/19 0459   (!) 97 2 °F (36 2 °C) 76 18 (!) 177/77 92 %      Temp src Heart Rate Source Patient Position - Orthostatic VS BP Location FiO2 (%)   -- 12/12/19 0711 12/12/19 0459 12/12/19 0459 --    Monitor Sitting Right arm       Pain Score       12/12/19 0459       No Pain        Wt Readings from Last 1 Encounters:   12/12/19 69 7 kg (153 lb 10 6 oz)     Additional Vital Signs:   12/12/19 08:09:11  97 8 °F (36 6 °C)  96  18  183/79Abnormal   114  96 %       12/12/19 0759              Nasal cannula     12/12/19 0711    82  18  161/73    98 %  Nasal cannula  Sitting   12/12/19 0530    80  18  132/62    93 %  None (Room air)  Sitting   12/12/19 0514              None (Room air)     12/12/19 0459  97 2 °F (36 2 °C)Abnormal   76  18  177/77Abnormal     92 %  None (Room air)  Sitting   Pertinent Labs/Diagnostic Test Results:   Results from last 7 days   Lab Units 12/13/19  0518 12/12/19  1016 12/12/19  0511   WBC Thousand/uL 24 66*  --  16 61*   HEMOGLOBIN g/dL 13 7  --  13 6   HEMATOCRIT % 40 0  --  39 6   PLATELETS Thousands/uL 323 264 277   NEUTROS ABS Thousands/µL  --   --  7 73*     Results from last 7 days   Lab Units 12/13/19  0518 12/12/19  0511   SODIUM mmol/L 143 138   POTASSIUM mmol/L 4 0 3 7   CHLORIDE mmol/L 104 102   CO2 mmol/L 30 33*   ANION GAP mmol/L 9 3*   BUN mg/dL 25 21   CREATININE mg/dL 1 02 1 03   EGFR ml/min/1 73sq m 53 53   CALCIUM mg/dL 9 5 8 9   MAGNESIUM mg/dL  --  1 8     Results from last 7 days   Lab Units 12/13/19  0518 12/12/19  0511   AST U/L 20 20   ALT U/L 47 33   ALK PHOS U/L 152* 152*   TOTAL PROTEIN g/dL 7 7 7 2   ALBUMIN g/dL 3 8 3 7   TOTAL BILIRUBIN mg/dL 0 30 0 50     Results from last 7 days   Lab Units 12/13/19  0518 12/12/19  0511   GLUCOSE RANDOM mg/dL 164* 110     Results from last 7 days   Lab Units 12/12/19  0511   TROPONIN I ng/mL <0 02     Results from last 7 days   Lab Units 12/12/19  0638   PROCALCITONIN ng/ml 0 05     Results from last 7 days   Lab Units 12/12/19  0511   LACTIC ACID mmol/L 0 7     Results from last 7 days   Lab Units 12/12/19  1016   CEA ng/mL 1 0     Results from last 7 days   Lab Units 12/12/19  1016 12/12/19  0519   STREP PNEUMONIAE ANTIGEN, URINE  Negative  --    INFLUENZA A PCR   --  None Detected   RSV PCR   --  None Detected     Results from last 7 days   Lab Units 12/12/19  0638 12/12/19  5971   BLOOD CULTURE  Received in Microbiology Lab  Culture in Progress  Received in Microbiology Lab  Culture in Progress  12/12  Ct chest=5 7 cm dominant right upper lobe mass and multiple other smaller nodules bilaterally  Findings are concerning for neoplasm, however pneumonia is not entirely excluded  Mediastinal and right hilar adenopathy  Ct a/p=1  Single enlarged right para-aortic retroperitoneal lymph node, nonspecific  2  No evidence of primary neoplasm  Ct head=1  No acute intracranial abnormality  Specifically no CT evidence of metastasis    2   Pansinus disease  ekg=Normal sinus rhythm  ED Treatment:   Medication Administration from 12/12/2019 0455 to 12/12/2019 4787       Date/Time Order Dose Route     12/12/2019 0519 sodium chloride 0 9 % bolus 1,000 mL 1,000 mL Intravenous     12/12/2019 0520 ipratropium-albuterol (DUO-NEB) 0 5-2 5 mg/3 mL inhalation solution 3 mL 3 mL Nebulization     12/12/2019 0521 methylPREDNISolone sodium succinate (Solu-MEDROL) injection 60 mg 60 mg Intravenous     12/12/2019 0643 cefTRIAXone (ROCEPHIN) IVPB (premix) 2,000 mg 2,000 mg Intravenous        Past Medical History:   Diagnosis Date    Hypertension     Neck pain     Pneumohemothorax     left     Present on Admission:   Abnormal CT of the chest   Shortness of breath  Admitting Diagnosis: Cough [R05]  Lung mass [R91 8]  Elevated blood pressure reading [R03 0]  Dyspnea [R06 00]  SOB (shortness of breath) [R06 02]  Hypoxia [R09 02]  Bilateral pneumonia [J18 9]  Age/Sex: 70 y o  female  Admission Orders:  Med surg  O2 @ 4ltr elias gibbs  Consult pulmonary  Consult oncology  Incentive spirometry  Scheduled Medications:  aspirin 81 mg Oral Daily   atorvastatin 40 mg Oral QPM   balanced salt solution 15 mL Both Eyes BID   cholecalciferol 400 Units Oral Daily   diltiazem 180 mg Oral Daily   enoxaparin 40 mg Subcutaneous Daily   ipratropium-albuterol 3 mL Nebulization TID   levofloxacin 750 mg Intravenous Q48H   levothyroxine 125 mcg Oral Every Other Day   loratadine 10 mg Oral Daily   methylPREDNISolone sodium succinate 40 mg Intravenous Q12H [address]   triamterene-hydrochlorothiazide 1 tablet Oral Daily     PRN Meds:  acetaminophen 650 mg Oral Q6H PRN   albuterol 2 5 mg Nebulization Q4H PRN   hydrALAZINE 10 mg Intravenous Q6H PRN     Network Utilization Review Department  [email or web address]  ATTENTION: Please call with any questions or concerns to [phone] and carefully listen to the prompts so that you are directed to the right person  All voicemails are confidential   Brunilda Yo all requests for admission clinical reviews, approved or denied determinations and any other requests to dedicated fax number below belonging to the campus where the patient is receiving treatment   List of dedicated fax numbers for the Facilities:  FACILITY NAME UR FAX NUMBER   ADMISSION DENIALS (Administrative/Medical Necessity) [phone]   [address] (Maternity/NICU/Pediatrics) [phone]   Jacqui Hirsch [phone]   Ramiro Sam [phone]   [facility] [address] [facility] [phone]   [address] [place] [address] [phone]

## 2019-12-13 NOTE — ASSESSMENT & PLAN NOTE
2-3 worsening shortness of breath  Differential URI, malignancy, post obstructive pneumonia  X-ray shows pneumonia, no evidence of pneumonia on CT  Will continue antibiotics for now, Levoquin  Respiratory protocol  incentive spirometry    Trend WBC, pro suzan, cultures

## 2019-12-13 NOTE — PROCEDURES
Interventional Radiology Procedure Note    PATIENT NAME: Rvier Serrano  : 1940  MRN: [id number]     Pre-op Diagnosis:   1  Dyspnea    2  Cough    3  Elevated blood pressure reading    4  Bilateral pneumonia    5  Hypoxia    6  Lung mass    7  Abnormal CT of the chest    8  Shortness of breath      2     Multiple lung masses, largest on the right  Suspicious for primary lung neoplasm with intrapulmonary metastases    Post-op Diagnosis:   1  Dyspnea    2  Cough    3  Elevated blood pressure reading    4  Bilateral pneumonia    5  Hypoxia    6  Lung mass    7  Abnormal CT of the chest    8  Shortness of breath      2     Same    Procedure: CT Guided Biopsy of the right upper lobe mass  Surgeon: Rosa Montejo MD  Assistants: No qualified resident was available, Resident is only observing  Estimated Blood Loss: 10 cc  Findings: Please see full report in PACS  Specimens: Please see full report in PACS  Complications: None  Anesthesia: Conscious sedation and Local  Prep: 2% Chlorhexidine and alcohol, allowed to dry prior to sterile draping  Timeout: Performed  Fluoro time: Please see full report in PACS  Radiation dose: Please see full report in PACS  Contrast dose: Please see full report in PACS  Contrast type: Please see full report in PACS  Contrast strength: Please see full report in PACS  Contrast route of administration: Please see full report in PACS  Antibiotics: None        Rosa Montejo MD     Date: 2019  Time: 5:57 PM

## 2019-12-13 NOTE — DISCHARGE SUMMARY
[facility] [address] [facility] Room / Bed: [address] 87 [id number]/561-05 Encounter: [id number]    BRIEF OVERVIEW  Admitting Provider: Uma Aguayo MD  Discharge Provider: Olvin Baltazar MD    Discharge To:  home  Facility: home    Outpatient Follow-Up: Heme/onc, PCP  Things to address at first follow up visit: Lung Biopsy  Labs and results pending at discharge: none    Admission Date: 12/12/2019     Discharge Date: No discharge date for patient encounter  * Abnormal CT of the chest  Assessment & Plan  Ct chest concerning for metastatic disease  CT Head/Abd/Pelvic to locate source  Tumor markers, CEA, AFP, HCG,  negative  Onc consult andPulmonary   Patient to have IR lung biopsy today with Dr Errol Smith  NPO after midnight      SIRS (systemic inflammatory response syndrome) (HCC)  Assessment & Plan  2-3 worsening shortness of breath  Differential URI, malignancy, post obstructive pneumonia  X-ray shows pneumonia, no evidence of pneumonia on CT  Will continue antibiotics for now, Levoquin  Respiratory protocol  incentive spirometry    Trend WBC, pro suzan, cultures    Hypothyroidism  Assessment & Plan  Stable on home meds  Continue home dose medication            [address] STAY    Procedures Performed/Pertinent Test results   IR guided lung biopsy    ALEENA  Leida Metz is a 70 y o  female who presents with shortness of breath  Patient reports worsening shortness of breath over the past 2-3 days, accompanied by productive cough of yellow sputum, tightness in her chest, fatigue and generalized weakness  She denies any fevers, chills, changes in weight  She denies any sick contact  Denies any alleviating factors, however does state colder air makes her symptoms worse  Smoker with 7 PP years in her 25s, no history of asthma, COPD    Up until 2 days ago has been in excellent state of health    Hospital Course  Patient was admitted for hypoxia and abnormal finding on lung imagining  She was started on antibiotics and nebulizer treatments, with symptomatic improvement  She initially required supplemental o2 which was weaned as tolerated  While on the floor IR preformed a core lung biopsy to assess suspicious findings on chest imaging  She tolerated treatment well, and was deemed stable for medical discharge  She will continue her levoquin 3 days after discharge, and f/u with PCP and heme/onc  Her hospital course was uncomplicated        Physical Exam at Discharge  /59   Pulse 80   Temp 97 9 °F (36 6 °C)   Resp 16   Ht 5' 2" (1 575 m)   Wt 69 7 kg (153 lb 10 6 oz)   SpO2 94%   BMI 28 10 kg/m²     General Appearance:    Alert, cooperative, no distress, appears stated age   Head:    Normocephalic, without obvious abnormality, atraumatic   Eyes:    PERRL, conjunctiva/corneas clear, EOM's intact   Ears:    Normal TM's and external ear canals, both ears           Neck:   Supple, symmetrical, trachea midline, no adenopathy;     thyroid:  no enlargement/tenderness/nodules; no carotid    bruit or JVD   Back:     Symmetric, no curvature, ROM normal, no CVA tenderness   Lungs:     Clear to auscultation bilaterally, respirations unlabored   Chest Wall:    No tenderness or deformity    Heart:    Regular rate and rhythm, S1 and S2 normal, no murmur, rub   or gallop       Abdomen:     Soft, non-tender, bowel sounds active all four quadrants,     no masses, no organomegaly           Extremities:   Extremities normal, atraumatic, no cyanosis or edema   Pulses:   2+ and symmetric all extremities           Neurologic:   CNII-XII intact, normal strength, sensation and reflexes     throughout         Allergies  Allergies   Allergen Reactions    Bee Venom Anaphylaxis    Penicillins Anaphylaxis       Diet restrictions: none  Activity restrictions: none  Code Status: Level 1 - Full Code  Advance Directive and Living Will: <no information>  Power of :    POLST:      Discharge Condition: stable      Discharge  Statement   I spent 20 minutes discharging the patient  This time was spent on the day of discharge  I had direct contact with the patient on the day of discharge  Additional documentation is required if more than 30 minutes were spent on discharge

## 2019-12-13 NOTE — PROGRESS NOTES
Progress Note - Pulmonary   Malik Mcdaniel 70 y o  female MRN: [id number]  Unit/Bed#: 416-01 Encounter: [id number]    Assessment/Plan:    1  Acute hypoxic respiratory failure  1  Resolved-> currently on room air  2  Monitor oxygen saturations and maintain SpO2 greater than or equal than 90%  3  Pulmonary toilet:  Increase activity as tolerated, out of bed as tolerated, cough and deep breathing  4  Continue DuoNeb t i d   2  Sinusitis  1  Add Flonase 1 spray each nostril daily p r n   2  Continue Claritin 10 mg tablet p o  daily  3  Abnormal CT chest secondary to right upper lobe mass  1  CT chest without contrast reveals by 0 7 x 4 1 cm spiculated mass right upper lobe, 1 7 x 2 2 cm nodule in posterior left upper lobe and small irregular nodular density in the lingula with multiple subcentimeter nodules scattered throughout both lungs and enlarged right hilar lymph node and right paratracheal lymph node  2  Differential includes multifocal pneumonia, postoperative pneumonia, cryptogenic organizing pneumonia, malignancy  3  Continue antibiotics with Levaquin  Recommend 5 day course  4  Trend WBC (luekomoid reaction likely secondary to steroids), temps (afebrile since admission), and procalcitonin  5  Continue DuoNeb t i d  Pulmonary toileting  6  Discontinue Solu-Medrol 40 mg IV after 1 dose today  7  Was prescribed prednisone 40 mg p o  For 3 additional days (5 days total of steroid therapy)  8  CT abdomen pelvis negative for metastasis  9  CT head negative for metastasis-did show pansinusitis  10  Recommend outpatient pulmonary follow-up with PET-CT to determine staging  11  IR biopsy today- will follow pathology   12  Further diagnostics and treatment plans to be discussed at pulmonary follow-up     Outpatient pulmonary follow-up per discharge instructions  Will on Monday if still inpatient  Chief Complaint:    I am okay      Subjective:    Patient was seen and examined today  No overnight events reported  Patient states that nebulizers help with her cough immensely  She notes that her shortness breath is markedly improved  Denies wheeze or chest tightness at this time  She does complain of nasal congestion and postnasal drip that she believes is making her cough worse  Denies sputum production but does note thick yellow mucus coming from her nares  No fevers or chills  Objective:    Vitals: Blood pressure 150/67, pulse 86, temperature 97 7 °F (36 5 °C), resp  rate 16, height 5' 2" (1 575 m), weight 69 7 kg (153 lb 10 6 oz), SpO2 98 %  room air,Body mass index is 28 1 kg/m²  Intake/Output Summary (Last 24 hours) at 12/13/2019 1139  Last data filed at 12/13/2019 0900  Gross per 24 hour   Intake 180 ml   Output    Net 180 ml       Invasive Devices     Peripheral Intravenous Line            Peripheral IV 12/12/19 Right Antecubital 1 day                Physical Exam:     Physical Exam   Constitutional: She is oriented to person, place, and time  She appears well-developed and well-nourished  No distress  HENT:   Head: Normocephalic and atraumatic  Right Ear: External ear normal    Left Ear: External ear normal    Mouth/Throat: Oropharynx is clear and moist  No oropharyngeal exudate  Eyes: Pupils are equal, round, and reactive to light  EOM are normal  Right eye exhibits no discharge  Left eye exhibits no discharge  Neck: Normal range of motion  Neck supple  No tracheal deviation present  Cardiovascular: Normal rate, regular rhythm, normal heart sounds and intact distal pulses  No murmur heard  Pulmonary/Chest: Effort normal and breath sounds normal  No respiratory distress  She has no wheezes  Abdominal: Soft  Bowel sounds are normal  She exhibits no distension  Musculoskeletal: Normal range of motion  She exhibits no edema or deformity  Neurological: She is alert and oriented to person, place, and time  No cranial nerve deficit  Skin: Skin is warm and dry  She is not diaphoretic   No erythema  No pallor  Psychiatric: She has a normal mood and affect  Her behavior is normal  Judgment and thought content normal    Vitals reviewed  Labs: I have personally reviewed pertinent lab results  , ABG: No results found for: PHART, EYB0YKA, PO2ART, MGP8CCR, E5XJIXBB, BEART, SOURCE, BNP: No results found for: BNP, CBC:   Lab Results   Component Value Date    WBC 24 66 (H) 12/13/2019    HGB 13 7 12/13/2019    HCT 40 0 12/13/2019    MCV 94 12/13/2019     12/13/2019    MCH 32 1 12/13/2019    MCHC 34 3 12/13/2019    RDW 12 7 12/13/2019    MPV 8 6 (L) 12/13/2019   , CMP:   Lab Results   Component Value Date    SODIUM 143 12/13/2019    K 4 0 12/13/2019     12/13/2019    CO2 30 12/13/2019    BUN 25 12/13/2019    CREATININE 1 02 12/13/2019    CALCIUM 9 5 12/13/2019    AST 20 12/13/2019    ALT 47 12/13/2019    ALKPHOS 152 (H) 12/13/2019    EGFR 53 12/13/2019   , PT/INR: No results found for: PT, INR, Troponin: No results found for: TROPONINI      Imaging and other studies: none to review today

## 2019-12-13 NOTE — ASSESSMENT & PLAN NOTE
Ct chest concerning for metastatic disease  CT Head/Abd/Pelvic to locate source  Tumor markers, CEA, AFP, HCG,  negative  Onc consult andPulmonary   Patient to have IR lung biopsy today with Dr Estrella Brandon  NPO after midnight

## 2019-12-13 NOTE — CONSULTS
Consultation - Hilda Nicholson 70 y o  female MRN: [id number]    Unit/Bed#: 416-01 Encounter: [id number]      Assessment/Plan     Assessment:  In summary, this is a 42-year-old female history of recently detected right pulmonary mass is  She is an oligo-smoker smoking 0 5-1 0 packs per day for about 15 years ending 40 years ago  Additionally, she states that she has substantial seasonal allergies  This could, in part, explain her eosinophilia/leukocytosis  Eosinophilia is generally associated with allergic or parasitic processes but can be associated with had no or squamous cell carcinomas in infrequent cases  Biopsy of pulmonary mass is obtained and currently pending  I suspect that there is more than 1 process in the lungs, possibly pneumonia and malignancy  We reviewed that for now, await results of biopsy and developed further treatment plan moving forward  Plan:  See above  History of Present Illness     HPI: Hilda Nicholson is a 70y o  year old female who presents with Shortness of breath  CT of the chest showed right upper lobe mass extending to the hilum, 5 7 cm   2 2 cm posterior upper lobe right upper lobe nodule  Multiple bilateral subcentimeter pulmonary nodules up to 8 mm noted  Mediastinal and hilar lymph nodes up to 1 6 cm   CT abdomen pelvis showed a 1 cm right para-aortic lymph node  No other abnormality  Contrasted CT of the head showed no intracranial abnormality  Pan sinus disease noted  WBC 16 6, hemoglobin 13 6, MCV 93, platelet count 590, differential shows 33% eosinophils, otherwise normal   CMP showed alk-phos 152, otherwise normal   CEA 1 0, AFP 3 1, HCG 2, CA 19-9 11  Inpatient consult to Oncology  Consult performed by: Lizzette Raymond DO  Consult ordered by: Radha Anna MD          Review of Systems   Constitutional: Positive for fatigue  Negative for appetite change, diaphoresis and fever  HENT: Negative for sinus pain  Eyes: Negative for discharge  Respiratory: Positive for cough (Productive of yellow sputum for a few days prior to admission ) and shortness of breath ( for several days prior to admission  )  Cardiovascular: Negative for chest pain  Gastrointestinal: Negative for abdominal pain, constipation and diarrhea  Endocrine: Negative for cold intolerance  Genitourinary: Negative for difficulty urinating and hematuria  Musculoskeletal: Negative for joint swelling  Skin: Negative for rash  Allergic/Immunologic: Negative for environmental allergies  Neurological: Negative for dizziness and headaches  Hematological: Negative for adenopathy  Psychiatric/Behavioral: Negative for agitation  Historical Information   Past Medical History:   Diagnosis Date    Hypertension     Neck pain     Pneumohemothorax     left     Past Surgical History:   Procedure Laterality Date    HAND SURGERY Bilateral      Social History   Social History     Substance and Sexual Activity   Alcohol Use Never    Alcohol/week: 0 0 standard drinks    Frequency: Never    Drinks per session: Patient refused    Binge frequency: Never     Social History     Substance and Sexual Activity   Drug Use Never     Social History     Tobacco Use   Smoking Status Former Smoker    Packs/day: 0 50    Years: 15 00    Pack years: 7 50    Last attempt to quit: 1974    Years since quittin 0   Smokeless Tobacco Never Used     Family History: History reviewed  No pertinent family history  Meds/Allergies   all current active meds have been reviewed  Allergies   Allergen Reactions    Bee Venom Anaphylaxis    Penicillins Anaphylaxis       Objective   Vitals: Blood pressure 150/67, pulse 86, temperature 97 7 °F (36 5 °C), resp  rate 16, height 5' 2" (1 575 m), weight 69 7 kg (153 lb 10 6 oz), SpO2 98 %      Intake/Output Summary (Last 24 hours) at 2019 1257  Last data filed at 2019 0900  Gross per 24 hour   Intake 180 ml   Output    Net 180 ml Invasive Devices     Peripheral Intravenous Line            Peripheral IV 12/12/19 Right Antecubital 1 day                Physical Exam   Constitutional: She appears well-developed  HENT:   Head: Normocephalic  Eyes: Conjunctivae are normal    Neck: Neck supple  Cardiovascular: Normal rate  Pulmonary/Chest: Effort normal    Abdominal: Soft  Musculoskeletal: She exhibits no edema  Lymphadenopathy:     She has no cervical adenopathy  She has no axillary adenopathy  Neurological: She is alert  Skin: Skin is warm  Psychiatric: She has a normal mood and affect  Lab Results: I have personally reviewed pertinent reports  Imaging Studies: I have personally reviewed pertinent reports  EKG, Pathology, and Other Studies: I have personally reviewed pertinent reports  Code Status: Level 1 - Full Code  Advance Directive and Living Will:      Power of :    POLST:      Counseling / Coordination of Care  Total floor / unit time spent today 40 minutes  Greater than 50% of total time was spent with the patient and / or family counseling and / or coordination of care  A description of the counseling / coordination of care: see note

## 2019-12-16 ENCOUNTER — OFFICE VISIT (OUTPATIENT)
Dept: PHYSICAL THERAPY | Facility: CLINIC | Age: 79
End: 2019-12-16
Payer: COMMERCIAL

## 2019-12-16 DIAGNOSIS — M54.2 NECK PAIN: Primary | ICD-10-CM

## 2019-12-16 PROCEDURE — 97110 THERAPEUTIC EXERCISES: CPT

## 2019-12-16 PROCEDURE — 97012 MECHANICAL TRACTION THERAPY: CPT

## 2019-12-16 PROCEDURE — 97140 MANUAL THERAPY 1/> REGIONS: CPT

## 2019-12-16 NOTE — PROGRESS NOTES
Daily Note     Today's date: 2019  Patient name: Kaylah Maloney  : 1940  MRN: [id number]  Referring provider: Kj Plaomares DO  Dx:   Encounter Diagnosis     ICD-10-CM    1  Neck pain M54 2        Start Time: 910        Subjective: patient stated she has gone backward with her progress in therapy after her episode last week where she hospitalized  Objective: See treatment diary below      Assessment: modified treatment due to decline in function from recent hospitalization for respiratory dysfunction  Patient had difficulty amb in clinic resulting in use of RW to safely walk  Patient need several seated therapeutic rest breaks between exercises due to fatigue  Limited ROM all planes with gait with greatest deficit with R rotation  Numbness in hand decreased post treatment as per patient report  Plan: Continue per plan of care  Progress treatment as tolerated    patient to be re-evaled next visit      Precautions:  DJD/DD t/o the cervical spine        Manual     Cervical and UB mobs and stretching  15 min  15 min 15 min 15 min  15 min    Mechanical traction 4 steps   13# 30% rope speed  15 min  4 steps  13#   30% rope  Speed  15 min  4 steps  13#  30% rope  Speed  15 min  4 step  13#  30% rope  Speed  4 steps  13#  30% rope   speed                               Exercise Diary     UBE   120 resist  10 min alt  120 resist  10 min alt  NuStep  10 min L3 UBE  120 resist 10 min  Alt    TB MTP, LTP, and ADD [address]  2x10 each  Green  2x10 each  Green  2x10 each   Green  2x10 each  Green    Scapular wall slides    10x  I,Y,T 10x  I,Y,T    Scapular pinch into wall   10x 3" hold  15x 3" hold 15x 3" hold  10x 3" hd    Wall push-up    10x 15x 10x   Doorway pec stretch   3x 15" hold  4x 15" hold 4x 15" hold  4x 15" hold    Caudel glide   3x 15" hold 4x 15" hold 4x 15 hold  4x 15 hold   Cervical rotation towel stretch   3x 15" hold 4x 15" hold  4x 15 hold  4x 15" hold    Supine chin tucks   10x 5" hold 10x 5" hold 10x 5" hold  10x 5" hold    Supine press and flex  10x 15x 15x 10x    Supine Punch   10x 15x 15x 10x                                                                                Modalities 12/4 12/5 12/9 12/11 12/16   MHP to the bilateral UB in seated  15 min  15 min 15 min 15 min 15 min

## 2019-12-16 NOTE — LETTER
2019    Isatu Vides DO  [address]    Patient: Javan Vinson   YOB: 1940   Date of Visit: 2019     Encounter Diagnosis     ICD-10-CM    1  Neck pain M54 2        Dear Dr Starla Schilder: Thank you for your recent referral of Javan Vinson  Please review the attached discharge summary from [address] [place] [address] recent visit  Please verify that you agree with the plan of care by signing the attached order  If you have any questions or concerns, please do not hesitate to call  I sincerely appreciate the opportunity to share in the care of one of your patients and hope to have another opportunity to work with you in the near future  Sincerely,    Chidi Perez, PT      Referring Provider:      I certify that I have read the below Plan of Care and certify the need for these services furnished under this plan of treatment while under my care  Isatu VidesDO  [address]: [phone]          Daily Note     Today's date: 2019  Patient name: Javan Vinson  : 1940  MRN: [id number]  Referring provider: Elyse Mcdonald DO  Dx:   Encounter Diagnosis     ICD-10-CM    1  Neck pain M54 2        Start Time: 910        Subjective: patient stated she has gone backward with her progress in therapy after her episode last week where she hospitalized  Objective: See treatment diary below      Assessment: modified treatment due to decline in function from recent hospitalization for respiratory dysfunction  Patient had difficulty amb in clinic resulting in use of RW to safely walk  Patient need several seated therapeutic rest breaks between exercises due to fatigue  Limited ROM all planes with gait with greatest deficit with R rotation  Numbness in hand decreased post treatment as per patient report  Plan: Continue per plan of care  Progress treatment as tolerated    patient to be re-evaled next visit      Precautions:  DJD/DD t/o the cervical spine        Manual     Cervical and UB mobs and stretching  15 min  15 min 15 min 15 min  15 min    Mechanical traction 4 steps   13# 30% rope speed  15 min  4 steps  13#   30% rope  Speed  15 min  4 steps  13#  30% rope  Speed  15 min  4 step  13#  30% rope  Speed  4 steps  13#  30% rope   speed                               Exercise Diary     UBE   120 resist  10 min alt  120 resist  10 min alt  NuStep  10 min L3 UBE  120 resist 10 min  Alt    TB MTP, LTP, and ADD [address]  2x10 each  Green  2x10 each  Green  2x10 each   Green  2x10 each  Green    Scapular wall slides    10x  I,Y,T 10x  I,Y,T    Scapular pinch into wall   10x 3" hold  15x 3" hold 15x 3" hold  10x 3" hd    Wall push-up    10x 15x 10x   Doorway pec stretch   3x 15" hold  4x 15" hold 4x 15" hold  4x 15" hold    Caudel glide   3x 15" hold 4x 15" hold 4x 15 hold  4x 15 hold   Cervical rotation towel stretch   3x 15" hold 4x 15" hold  4x 15 hold  4x 15" hold    Supine chin tucks   10x 5" hold 10x 5" hold 10x 5" hold  10x 5" hold    Supine press and flex  10x 15x 15x 10x    Supine Punch   10x 15x 15x 10x                                                                                Modalities    MHP to the bilateral UB in seated  15 min  15 min 15 min 15 min 15 min                          PT Discharge    Today's date: 2019  Patient name: Cyndy Barrera  : 1940  MRN: [id number]  Referring provider: Arminda Albarran DO  Dx:   Encounter Diagnosis     ICD-10-CM    1  Neck pain M54 2        Start Time: 910  Stop Time:   Total time in clinic (min): 105 minutes    Assessment  Assessment details: PT notes decision to DC with HEP secondary to change in medical status      Impairments: abnormal or restricted ROM, activity intolerance, impaired balance, impaired physical strength, lacks appropriate home exercise program, pain with function, scapular dyskinesis and poor posture   Understanding of Dx/Px/POC: good   Prognosis: good    Goals  ST  Initiate HEP  2  Decrease guarding/shoulder elevation with seated posture  3  Increase strength by 1-2 mm grades  4  Increase ROM by 25-50%   5  Decrease pain by 25-50%   LT  Decrease limitations with lifting, OH, carrying and ADL  2  Demonstration of improved UB posture  3  No radicular symptoms in the bilateral UE  4  DC with HEP    Plan  Plan details: DC with HEP  Patient would benefit from: PT eval and skilled physical therapy  Planned modality interventions: thermotherapy: hydrocollator packs and traction  Planned therapy interventions: manual therapy, patient education, postural training, neuromuscular re-education, strengthening, stretching, therapeutic exercise, home exercise program, graded exercise, functional ROM exercises and flexibility        Subjective Evaluation    History of Present Illness  Mechanism of injury: PT notes the patient was hospitalized so unable to attend skilled therapy  Pain  Current pain ratin  At best pain ratin  At worst pain rating: 10  Location: Cervical spine and UB   Quality: burning, discomfort, radiating, throbbing and tight  Relieving factors: rest  Aggravating factors: lifting and overhead activity    Treatments  Current treatment: medication and physical therapy  Patient Goals  Patient goals for therapy: decreased pain, increased motion, increased strength and independence with ADLs/IADLs          Objective     Postural Observations  Seated posture: fair  Standing posture: fair    Additional Postural Observation Details  PT notes the patient with fair UB posture with bilateral elevated shoulders, forward head and bilateral rounded shoulders     Palpation   Left   Muscle spasm in the cervical paraspinals, levator scapulae, rhomboids, scalenes and upper trapezius     Tenderness of the cervical paraspinals, levator scapulae, rhomboids, scalenes, suboccipitals and upper trapezius  Right   Muscle spasm in the cervical paraspinals, levator scapulae, rhomboids, scalenes and upper trapezius  Tenderness of the cervical paraspinals, levator scapulae, rhomboids, scalenes, suboccipitals and upper trapezius  Tenderness   Cervical Spine   Tenderness in the spinous process       Neurological Testing     Reflexes   Left   Biceps (C5/C6): normal (2+)  Brachioradialis (C6): normal (2+)    Right   Biceps (C5/C6): normal (2+)  Brachioradialis (C6): normal (2+)    Active Range of Motion   Cervical/Thoracic Spine       Cervical    Flexion: 19 degrees   Extension: 13 degrees     with pain  Left lateral flexion: 12 degrees     with pain  Right lateral flexion: 14 degrees     with pain  Left rotation: 41 degrees with pain  Right rotation: 37 degrees    with pain  Left Shoulder   Flexion: 140 degrees   Abduction: 135 degrees   External rotation 90°:  70 degrees   Internal rotation 90°:  50 degrees     Right Shoulder   Flexion: 140 degrees   Abduction: 135 degrees   External rotation 90°:  70 degrees  Internal rotation 90°:  50 degrees     Left Elbow   Normal active range of motion    Right Elbow   Normal active range of motion    Additional Active Range of Motion Details  PT notes the patient with decrease ROM and strength t/o the cervical spine and UB   PT notes decrease ROM and strength t/o the bilateral shoulders     Passive Range of Motion   Left Shoulder   Normal passive range of motion    Right Shoulder   Normal passive range of motion    Strength/Myotome Testing   Cervical Spine   Neck extension: 3+    Left   Neck lateral flexion (C3): 3+    Right   Neck lateral flexion (C3): 3+    Left Shoulder     Planes of Motion   Flexion: 3+   Extension: 4   Abduction: 3+   Adduction: 4   External rotation at 0°:  3+   External rotation at 90°:  3+   Internal rotation at 0°:  4-   Internal rotation at 90°:  4-     Right Shoulder     Planes of Motion   Flexion: 3+   Extension: 4   Abduction: 3+   Adduction: 4   External rotation at 0°:  3+   External rotation at 90°:  3+   Internal rotation at 0°:  4-   Internal rotation at 90°:  4-     Left Elbow   Flexion: 4  Extension: 4    Right Elbow   Flexion: 4  Extension: 4    Tests   Cervical   Positive vertical compression and lumbar distraction test     Left   Positive Spurling's Test A  Right   Positive Spurling's Test A  Lumbar   Positive vertical compression        Additional Tests Details  PT notes relief of symptoms with cervical traction             Precautions:  DJD/DD t/o the cervical spine

## 2019-12-17 LAB
BACTERIA BLD CULT: NORMAL
BACTERIA BLD CULT: NORMAL

## 2019-12-17 NOTE — QUICK NOTE
I saw and treated this this patient on [facility] inpatient service 12/12/2018  While in the service noted to have a right spiculated lobar lesion concerning for cancer, IR guided lung biopsy performed  I was notified that the biopsy results came back positive for adenocarcinoma  I personally contacted patient's PCP, which was listed as Jhoana Michelle of [facility] [phone]  Notified that Emily Cheese is no longer at that practice however his partner Dr Hamida Singh is now this patient's PCP    I left a message with nurse Deonna Dean of this office notified her of the results, which were positive for adenocarcinoma,  she states she will relay these results to Dr Karlie Card, and appropriate follow-up will be done by his office       12/17/19  17:09  Eron James MD

## 2019-12-18 ENCOUNTER — APPOINTMENT (OUTPATIENT)
Dept: PHYSICAL THERAPY | Facility: CLINIC | Age: 79
End: 2019-12-18
Payer: COMMERCIAL

## 2019-12-20 ENCOUNTER — APPOINTMENT (OUTPATIENT)
Dept: PHYSICAL THERAPY | Facility: CLINIC | Age: 79
End: 2019-12-20
Payer: COMMERCIAL

## 2019-12-24 NOTE — PROGRESS NOTES
PT Discharge    Today's date: 2019  Patient name: Didi Dooley  : 1940  MRN: [id number]  Referring provider: Princess Ojeda DO  Dx:   Encounter Diagnosis     ICD-10-CM    1  Neck pain M54 2        Start Time: 910  Stop Time:   Total time in clinic (min): 105 minutes    Assessment  Assessment details: PT notes decision to DC with HEP secondary to change in medical status  Impairments: abnormal or restricted ROM, activity intolerance, impaired balance, impaired physical strength, lacks appropriate home exercise program, pain with function, scapular dyskinesis and poor posture   Understanding of Dx/Px/POC: good   Prognosis: good    Goals  ST  Initiate HEP  2  Decrease guarding/shoulder elevation with seated posture  3  Increase strength by 1-2 mm grades  4  Increase ROM by 25-50%   5  Decrease pain by 25-50%   LT  Decrease limitations with lifting, OH, carrying and ADL  2  Demonstration of improved UB posture  3  No radicular symptoms in the bilateral UE  4  DC with HEP    Plan  Plan details: DC with HEP  Patient would benefit from: PT eval and skilled physical therapy  Planned modality interventions: thermotherapy: hydrocollator packs and traction  Planned therapy interventions: manual therapy, patient education, postural training, neuromuscular re-education, strengthening, stretching, therapeutic exercise, home exercise program, graded exercise, functional ROM exercises and flexibility        Subjective Evaluation    History of Present Illness  Mechanism of injury: PT notes the patient was hospitalized so unable to attend skilled therapy     Pain  Current pain ratin  At best pain ratin  At worst pain rating: 10  Location: Cervical spine and UB   Quality: burning, discomfort, radiating, throbbing and tight  Relieving factors: rest  Aggravating factors: lifting and overhead activity    Treatments  Current treatment: medication and physical therapy  Patient Goals  Patient goals for therapy: decreased pain, increased motion, increased strength and independence with ADLs/IADLs          Objective     Postural Observations  Seated posture: fair  Standing posture: fair    Additional Postural Observation Details  PT notes the patient with fair UB posture with bilateral elevated shoulders, forward head and bilateral rounded shoulders     Palpation   Left   Muscle spasm in the cervical paraspinals, levator scapulae, rhomboids, scalenes and upper trapezius  Tenderness of the cervical paraspinals, levator scapulae, rhomboids, scalenes, suboccipitals and upper trapezius  Right   Muscle spasm in the cervical paraspinals, levator scapulae, rhomboids, scalenes and upper trapezius  Tenderness of the cervical paraspinals, levator scapulae, rhomboids, scalenes, suboccipitals and upper trapezius  Tenderness   Cervical Spine   Tenderness in the spinous process       Neurological Testing     Reflexes   Left   Biceps (C5/C6): normal (2+)  Brachioradialis (C6): normal (2+)    Right   Biceps (C5/C6): normal (2+)  Brachioradialis (C6): normal (2+)    Active Range of Motion   Cervical/Thoracic Spine       Cervical    Flexion: 19 degrees   Extension: 13 degrees     with pain  Left lateral flexion: 12 degrees     with pain  Right lateral flexion: 14 degrees     with pain  Left rotation: 41 degrees with pain  Right rotation: 37 degrees    with pain  Left Shoulder   Flexion: 140 degrees   Abduction: 135 degrees   External rotation 90°: 70 degrees   Internal rotation 90°: 50 degrees     Right Shoulder   Flexion: 140 degrees   Abduction: 135 degrees   External rotation 90°: 70 degrees  Internal rotation 90°: 50 degrees     Left Elbow   Normal active range of motion    Right Elbow   Normal active range of motion    Additional Active Range of Motion Details  PT notes the patient with decrease ROM and strength t/o the cervical spine and UB   PT notes decrease ROM and strength t/o the bilateral shoulders     Passive Range of Motion   Left Shoulder   Normal passive range of motion    Right Shoulder   Normal passive range of motion    Strength/Myotome Testing   Cervical Spine   Neck extension: 3+    Left   Neck lateral flexion (C3): 3+    Right   Neck lateral flexion (C3): 3+    Left Shoulder     Planes of Motion   Flexion: 3+   Extension: 4   Abduction: 3+   Adduction: 4   External rotation at 0°: 3+   External rotation at 90°: 3+   Internal rotation at 0°: 4-   Internal rotation at 90°: 4-     Right Shoulder     Planes of Motion   Flexion: 3+   Extension: 4   Abduction: 3+   Adduction: 4   External rotation at 0°: 3+   External rotation at 90°: 3+   Internal rotation at 0°: 4-   Internal rotation at 90°: 4-     Left Elbow   Flexion: 4  Extension: 4    Right Elbow   Flexion: 4  Extension: 4    Tests   Cervical   Positive vertical compression and lumbar distraction test     Left   Positive Spurling's Test A  Right   Positive Spurling's Test A  Lumbar   Positive vertical compression        Additional Tests Details  PT notes relief of symptoms with cervical traction             Precautions:  DJD/DD t/o the cervical spine

## 2019-12-31 ENCOUNTER — TRANSCRIBE ORDERS (OUTPATIENT)
Dept: PHYSICAL THERAPY | Facility: CLINIC | Age: 79
End: 2019-12-31

## 2019-12-31 DIAGNOSIS — M54.2 NECK PAIN: Primary | ICD-10-CM

## 2020-01-06 ENCOUNTER — EVALUATION (OUTPATIENT)
Dept: PHYSICAL THERAPY | Facility: CLINIC | Age: 80
End: 2020-01-06
Payer: COMMERCIAL

## 2020-01-06 DIAGNOSIS — M48.02 SPINAL STENOSIS OF CERVICAL REGION: Primary | ICD-10-CM

## 2020-01-06 PROCEDURE — 97140 MANUAL THERAPY 1/> REGIONS: CPT | Performed by: PHYSICAL THERAPIST

## 2020-01-06 PROCEDURE — 97162 PT EVAL MOD COMPLEX 30 MIN: CPT | Performed by: PHYSICAL THERAPIST

## 2020-01-06 PROCEDURE — 97535 SELF CARE MNGMENT TRAINING: CPT | Performed by: PHYSICAL THERAPIST
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
## 2020-01-08 ENCOUNTER — OFFICE VISIT (OUTPATIENT)
Dept: PHYSICAL THERAPY | Facility: CLINIC | Age: 80
End: 2020-01-08
Payer: COMMERCIAL

## 2020-01-08 DIAGNOSIS — M54.2 NECK PAIN: ICD-10-CM

## 2020-01-08 DIAGNOSIS — M48.02 SPINAL STENOSIS OF CERVICAL REGION: Primary | ICD-10-CM

## 2020-01-08 PROCEDURE — 97140 MANUAL THERAPY 1/> REGIONS: CPT

## 2020-01-08 PROCEDURE — 97110 THERAPEUTIC EXERCISES: CPT

## 2020-01-08 NOTE — PROGRESS NOTES
Daily Note     Today's date: 2020  Patient name: Ashvin Richards  : 1940  MRN: [id number]  Referring provider: Tamara Martins DO  Dx:   Encounter Diagnosis     ICD-10-CM    1  Spinal stenosis of cervical region M48 02    2  Neck pain M54 2                   Subjective: "Just the bouncing in the car driving, makes my neck stiff and hurt  I think I pulled my left leg (quad) somehow " Patient describes soreness in quad & shoulders today as well  Objective: See treatment diary below      Assessment: Tolerated initiation of TE treatment fair  Patient would benefit from continued PT  Patient is very limited and guarded with motion  Patient did report some left quad soreness with wall slides and stepping forward into door stretch  Plan: Continue per plan of care        Precautions:  No ES or US secondary to possible cancer       Manual        Gentle cervical ROM stretching with STM to the cervical spine and UB  15 min  15 min                                          Exercise Diary        UBE   120 resist 10 min ALT      TB MTP and LTP (Old way)   10x ea Red      Scapular pinch into wall   10x ea 3-5" hd      Scapular wall slides   IYT only  10x ea      TB Bilateral ER and horizontal ABD         Doorway pec stretch   5x 15" hd      Caudel glide   3x 15" hd      Cervical rotation towel stretch   3x 15" hold      Supine chin tucks   10x 5" hd      Supine press and flex        Supine Punch                                                                         HEP update/review  15 min            Modalities       MHP to the bilateral UB in seated  15 min  15 min

## 2020-01-09 ENCOUNTER — OFFICE VISIT (OUTPATIENT)
Dept: PHYSICAL THERAPY | Facility: CLINIC | Age: 80
End: 2020-01-09
Payer: COMMERCIAL

## 2020-01-09 DIAGNOSIS — M54.2 NECK PAIN: ICD-10-CM

## 2020-01-09 DIAGNOSIS — M48.02 SPINAL STENOSIS OF CERVICAL REGION: Primary | ICD-10-CM

## 2020-01-09 PROCEDURE — 97110 THERAPEUTIC EXERCISES: CPT | Performed by: PHYSICAL THERAPIST

## 2020-01-09 PROCEDURE — 97140 MANUAL THERAPY 1/> REGIONS: CPT | Performed by: PHYSICAL THERAPIST

## 2020-01-09 NOTE — PROGRESS NOTES
Daily Note     Today's date: 2020  Patient name: Flako Dukes  : 1940  MRN: [id number]  Referring provider: Digna Gregg DO  Dx:   Encounter Diagnosis     ICD-10-CM    1  Spinal stenosis of cervical region M48 02    2  Neck pain M54 2                   Subjective:  Patient reports the neck just feels tight today with continuation of limitations with neck movement  Patient reports 6/10 stiffness in the neck and UB at the start of the session  Post session, the reports feeling looser with 2/10 stiffness in the neck and UB but no c/o pain  Objective: See treatment diary below      Assessment: Tolerated treatment fair  PT notes continuation of progression of TE program with focus on cervical/UB stabilization and manual therapy to decrease pain levels and improve functional limitations to meet therapy goals  PT notes the patient with decrease ROM t/o all movements of the cervical spine with need for continuation of skilled therapy  Plan: Continue per plan of care        Precautions:  No ES or US secondary to possible cancer       Manual        Gentle cervical ROM stretching with STM to the cervical spine and UB  15 min  15 min 15 min                                          Exercise Diary        UBE   120 resist 10 min  resist  10 min   Alt      TB MTP and LTP (Old way)   10x ea Red 2x10 each   Red      Scapular pinch into wall   10x ea 3-5" hd 15x3" Hold      Scapular wall slides   IYT only  10x ea 2x10 Each   IYT     TB Bilateral ER and horizontal ABD         Doorway pec stretch   5x 15" hd 5x15" Hold      Caudel glide   3x 15" hd 5x15" Hold      Cervical rotation towel stretch   3x 15" hold 3x15" Hold      Supine chin tucks   10x 5" hd 2x10   5" Hold      Supine press and flex   15x      Supine Punch                                                                         HEP update/review  15 min            Modalities       MHP to the bilateral UB in seated  15 min  15 min 15 min

## 2020-01-13 ENCOUNTER — TRANSCRIBE ORDERS (OUTPATIENT)
Dept: PHYSICAL THERAPY | Facility: CLINIC | Age: 80
End: 2020-01-13

## 2020-01-13 ENCOUNTER — OFFICE VISIT (OUTPATIENT)
Dept: PHYSICAL THERAPY | Facility: CLINIC | Age: 80
End: 2020-01-13
Payer: COMMERCIAL

## 2020-01-13 DIAGNOSIS — M54.2 NECK PAIN: ICD-10-CM

## 2020-01-13 DIAGNOSIS — M48.02 SPINAL STENOSIS OF CERVICAL REGION: Primary | ICD-10-CM

## 2020-01-13 PROCEDURE — 97110 THERAPEUTIC EXERCISES: CPT

## 2020-01-13 PROCEDURE — 97140 MANUAL THERAPY 1/> REGIONS: CPT

## 2020-01-13 NOTE — PROGRESS NOTES
Daily Note     Today's date: 2020  Patient name: Rajendra Rogers  : 1940  MRN: [id number]  Referring provider: Matthew Beckman DO  Dx:   Encounter Diagnosis     ICD-10-CM    1  Spinal stenosis of cervical region M48 02    2  Neck pain M54 2                   Subjective: "I have a sore (right) shoulder since last visit " Patient also reports difficulty rising from a chair due to stiffness in her neck  Objective: See treatment diary below      Assessment: Tolerated treatment well  Patient exhibited good technique with therapeutic exercises  Plan: Continue per plan of care        Precautions:  No ES or US secondary to possible cancer       Manual      Gentle cervical ROM stretching with STM to the cervical spine and UB  15 min  15 min 15 min  15 min                                        Exercise Diary      UBE   120 resist 10 min  resist  10 min   Alt  120 resist 10 min ALT    TB MTP and LTP (Old way)   10x ea Red 2x10 each   Red  2x10 ea Red     Scapular pinch into wall   10x ea 3-5" hd 15x3" Hold      Scapular wall slides   IYT only  10x ea 2x10 Each   IYT 2x10 ea IYT    TB Bilateral ER and horizontal ABD         Doorway pec stretch   5x 15" hd 5x15" Hold  5x 15" hold    Caudel glide   3x 15" hd 5x15" Hold  5x 15" hold    Cervical rotation towel stretch   3x 15" hold 3x15" Hold  3x 15" hd    Supine chin tucks   10x 5" hd 2x10   5" Hold  2x10 5" hold    Supine press and flex   15x  Held    Supine Punch                                                                         HEP update/review  15 min            Modalities     MHP to the bilateral UB in seated  15 min  15 min 15 min  15 min

## 2020-01-15 ENCOUNTER — OFFICE VISIT (OUTPATIENT)
Dept: PHYSICAL THERAPY | Facility: CLINIC | Age: 80
End: 2020-01-15
Payer: COMMERCIAL

## 2020-01-15 DIAGNOSIS — M54.2 NECK PAIN: ICD-10-CM

## 2020-01-15 DIAGNOSIS — M48.02 SPINAL STENOSIS OF CERVICAL REGION: Primary | ICD-10-CM

## 2020-01-15 PROCEDURE — 97140 MANUAL THERAPY 1/> REGIONS: CPT

## 2020-01-15 PROCEDURE — 97110 THERAPEUTIC EXERCISES: CPT

## 2020-01-15 NOTE — PROGRESS NOTES
Daily Note     Today's date: 1/15/2020  Patient name: Madai Holden  : 1940  MRN: [id number]  Referring provider: Anthony Peter DO  Dx:   Encounter Diagnosis     ICD-10-CM    1  Spinal stenosis of cervical region M48 02    2  Neck pain M54 2                   Subjective: patient stated she went for an MRI yesterday  After the MRI, she had nerve pain t/o her body  She said it may have been the the positioning that caused her pain but it caused muscle spasms in her leg and effected her gait       Objective: See treatment diary below      Assessment: patient presented with tight cervical musculature limiting motion in all planes  Patient was cautious amb as not to lose balance  Overall good execution of exercise with program with moderate cueing  Patient would benefit from continued therapy to decrease pain and increase strength and flexibility to improve LOF  Plan: Continue per plan of care  Progress treatment as tolerated         Precautions:  No ES or US secondary to possible cancer       Manual  1/6  1/8 1/9  1/13 1/15   Gentle cervical ROM stretching with STM to the cervical spine and UB  15 min  15 min 15 min  15 min 15 min                                       Exercise Diary  1/6  1/8 1/9  1/13 1/15   UBE   120 resist 10 min  resist  10 min   Alt  120 resist 10 min  resist 10 min alt    TB MTP and LTP (Old way)   10x ea Red 2x10 each   Red  2x10 ea Red  2x10 ea red    Scapular pinch into wall   10x ea 3-5" hd 15x3" Hold   15x 3" hold    Scapular wall slides   IYT only  10x ea 2x10 Each   IYT 2x10 ea IYT 2x10 ea IYT   TB Bilateral ER and horizontal ABD      2x10  Red    Doorway pec stretch   5x 15" hd 5x15" Hold  5x 15" hold 5x 15" hold   Caudel glide   3x 15" hd 5x15" Hold  5x 15" hold 5x 15" hold    Cervical rotation towel stretch   3x 15" hold 3x15" Hold  3x 15" hd 4x 15" hold   Supine chin tucks   10x 5" hd 2x10   5" Hold  2x10 5" hold 2x10 5" hold    Supine press and flex   15x Held    Supine Punch                                                                         HEP update/review  15 min            Modalities 1/6  1/8 1/9  1/13 1/15   MHP to the bilateral UB in seated  15 min  15 min 15 min  15 min 15 min

## 2020-01-17 ENCOUNTER — OFFICE VISIT (OUTPATIENT)
Dept: PHYSICAL THERAPY | Facility: CLINIC | Age: 80
End: 2020-01-17
Payer: COMMERCIAL

## 2020-01-17 DIAGNOSIS — M54.2 NECK PAIN: ICD-10-CM

## 2020-01-17 DIAGNOSIS — M48.02 SPINAL STENOSIS OF CERVICAL REGION: Primary | ICD-10-CM

## 2020-01-17 PROCEDURE — 97140 MANUAL THERAPY 1/> REGIONS: CPT

## 2020-01-17 PROCEDURE — 97110 THERAPEUTIC EXERCISES: CPT

## 2020-01-17 NOTE — PROGRESS NOTES
Daily Note     Today's date: 2020  Patient name: Javan Vinson  : 1940  MRN: [id number]  Referring provider: Elyse Mcdonald DO  Dx:   Encounter Diagnosis     ICD-10-CM    1  Spinal stenosis of cervical region M48 02    2  Neck pain M54 2                   Subjective: Patient reports improvement following her treatment but stiffness returns  "I have been sleeping better, it doesn't bother me as much at night "      Objective: See treatment diary below      Assessment: Tolerated treatment fair  Patient demonstrated fatigue post treatment      Plan: Continue per plan of care        Precautions:  No ES or US secondary to possible cancer       Manual  1/17  1/8 1/9  1/13 1/15   Gentle cervical ROM stretching with STM to the cervical spine and UB  15 min  15 min 15 min  15 min 15 min                                       Exercise Diary  1/17 1/8 1/9  1/13 1/15   UBE  120 Resist 10 min  resist 10 min  resist  10 min   Alt  120 resist 10 min  resist 10 min alt    TB MTP and LTP (Old way)  2x10 ea Red 10x ea Red 2x10 each   Red  2x10 ea Red  2x10 ea red    Scapular pinch into wall  15x 3" hold 10x ea 3-5" hd 15x3" Hold   15x 3" hold    Scapular wall slides  2x10 IYT IYT only  10x ea 2x10 Each   IYT 2x10 ea IYT 2x10 ea IYT   TB Bilateral ER and horizontal ABD  2x10 Red    2x10  Red    Doorway pec stretch  5x 15" hd 5x 15" hd 5x15" Hold  5x 15" hold 5x 15" hold   Caudel glide  3x 15" hd 3x 15" hd 5x15" Hold  5x 15" hold 5x 15" hold    Cervical rotation towel stretch  3x 15" hd 3x 15" hold 3x15" Hold  3x 15" hd 4x 15" hold   Supine chin tucks  10x 5" hd 10x 5" hd 2x10   5" Hold  2x10 5" hold 2x10 5" hold    Supine press and flex   15x  Held    Supine Punch                                                                         HEP update/review             Modalities 1/17 1/8 1/9  1/13 1/15   MHP to the bilateral UB in seated  15 min  15 min 15 min  15 min 15 min

## 2020-01-20 ENCOUNTER — OFFICE VISIT (OUTPATIENT)
Dept: PHYSICAL THERAPY | Facility: CLINIC | Age: 80
End: 2020-01-20
Payer: COMMERCIAL

## 2020-01-20 DIAGNOSIS — M48.02 SPINAL STENOSIS OF CERVICAL REGION: Primary | ICD-10-CM

## 2020-01-20 DIAGNOSIS — M54.2 NECK PAIN: ICD-10-CM

## 2020-01-20 PROCEDURE — 97140 MANUAL THERAPY 1/> REGIONS: CPT | Performed by: PHYSICAL THERAPIST

## 2020-01-20 PROCEDURE — 97110 THERAPEUTIC EXERCISES: CPT | Performed by: PHYSICAL THERAPIST

## 2020-01-20 NOTE — PROGRESS NOTES
Daily Note     Today's date: 2020  Patient name: Placido Reyna  : 1940  MRN: [id number]  Referring provider: Genevive Landau, DO  Dx:   Encounter Diagnosis     ICD-10-CM    1  Spinal stenosis of cervical region M48 02    2  Neck pain M54 2                   Subjective:  Patient reports she is able to bend her neck back far enough to put her eye drops in which makes her happy  Objective: See treatment diary below      Assessment: Tolerated treatment fair  PT notes the patient with continuation of decrease ROM and strength t/o the cervical spine and UB with need for continuation of skilled therapy  PT notes the patient with need for frequent verbal and manual cues for proper performance and pacing with TE  Plan: Continue per plan of care        Precautions:  No ES or US secondary to possible cancer       Manual  1/17  1/20   1/15   Gentle cervical ROM stretching with STM to the cervical spine and UB  15 min  15 min   15 min                                       Exercise Diary  1/17 1/20  1/9  1/13 1/15   UBE  120 Resist 10 min  resist   10 min    resist  10 min   Alt  120 resist 10 min  resist 10 min alt    TB MTP and LTP (Old way)  2x10 ea Red 2x15 Each   Red 2x10 each   Red  2x10 ea Red  2x10 ea red    Scapular pinch into wall  15x 3" hold 2x10 Each   5" Hold  15x3" Hold   15x 3" hold    Scapular wall slides  2x10 IYT IYT only  2x10 Each  2x10 Each   IYT 2x10 ea IYT 2x10 ea IYT   TB Bilateral ER and horizontal ABD  2x10 Red 2x10 Each   Red    2x10  Red    Doorway pec stretch  5x 15" hd 5x 15" Hold  5x15" Hold  5x 15" hold 5x 15" hold   Caudel glide  3x 15" hd 5x 15" Hold  5x15" Hold  5x 15" hold 5x 15" hold    Cervical rotation towel stretch  3x 15" hd 5x 15" Hold 3x15" Hold  3x 15" hd 4x 15" hold   Supine chin tucks  10x 5" hd 10x 5" Hold  2x10   5" Hold  2x10 5" hold 2x10 5" hold    Supine press and flex   15x  Held    Supine Punch HEP update/review             Modalities 1/17 1/20 1/9  1/13 1/15   MHP to the bilateral UB in seated  15 min  15 min 15 min  15 min 15 min

## 2020-01-22 ENCOUNTER — OFFICE VISIT (OUTPATIENT)
Dept: PHYSICAL THERAPY | Facility: CLINIC | Age: 80
End: 2020-01-22
Payer: COMMERCIAL

## 2020-01-22 DIAGNOSIS — M48.02 SPINAL STENOSIS OF CERVICAL REGION: Primary | ICD-10-CM

## 2020-01-22 DIAGNOSIS — M54.2 NECK PAIN: ICD-10-CM

## 2020-01-22 PROCEDURE — 97140 MANUAL THERAPY 1/> REGIONS: CPT

## 2020-01-22 PROCEDURE — 97110 THERAPEUTIC EXERCISES: CPT

## 2020-01-22 NOTE — PROGRESS NOTES
Daily Note     Today's date: 2020  Patient name: Lisy Min  : 1940  MRN: [id number]  Referring provider: Jennifer Ram DO  Dx:   Encounter Diagnosis     ICD-10-CM    1  Spinal stenosis of cervical region M48 02    2  Neck pain M54 2                   Subjective: Patient had a follow up with surgeon yesterday  She states she was pleased with her progress and informed her to continue therapy (6 weeks total unless she needs more)  "I am able to clean my house now "      Objective: See treatment diary below      Assessment: Tolerated treatment fair  Patient demonstrated fatigue post treatment  ROM continues to be very limited supporting the need for continued skilled therapy  Plan: Continue per plan of care        Precautions:  No ES or US secondary to possible cancer       Manual       Gentle cervical ROM stretching with STM to the cervical spine and UB  15 min  15 min 15 min                                         Exercise Diary       UBE  120 Resist 10 min  resist   10 min    resist  10 min   Alt      TB MTP and LTP (Old way)  2x10 ea Red 2x15 Each   Red 2x10 each   Red      Scapular pinch into wall  15x 3" hold 2x10 Each   5" Hold  15x3" Hold      Scapular wall slides  2x10 IYT IYT only  2x10 Each  2x10 Each   IYT     TB Bilateral ER and horizontal ABD  2x10 Red 2x10 Each   Red  2x10 Red     Doorway pec stretch  5x 15" hd 5x 15" Hold  5x15" Hold      Caudel glide  3x 15" hd 5x 15" Hold  5x15" Hold      Cervical rotation towel stretch  3x 15" hd 5x 15" Hold 3x15" Hold      Supine chin tucks  10x 5" hd 10x 5" Hold  10x   5" Hold      Supine press and flex        Supine Punch                                                                         HEP update/review             Modalities      MHP to the bilateral UB in seated  15 min  15 min 15 min

## 2020-01-24 ENCOUNTER — TRANSCRIBE ORDERS (OUTPATIENT)
Dept: PHYSICAL THERAPY | Facility: CLINIC | Age: 80
End: 2020-01-24

## 2020-01-24 ENCOUNTER — EVALUATION (OUTPATIENT)
Dept: PHYSICAL THERAPY | Facility: CLINIC | Age: 80
End: 2020-01-24
Payer: COMMERCIAL

## 2020-01-24 DIAGNOSIS — M48.02 SPINAL STENOSIS IN CERVICAL REGION: Primary | ICD-10-CM

## 2020-01-24 DIAGNOSIS — M54.2 NECK PAIN: ICD-10-CM

## 2020-01-24 DIAGNOSIS — C34.11 MALIGNANT NEOPLASM OF UPPER LOBE OF RIGHT LUNG (HCC): ICD-10-CM

## 2020-01-24 DIAGNOSIS — M48.02 SPINAL STENOSIS OF CERVICAL REGION: Primary | ICD-10-CM

## 2020-01-24 PROCEDURE — 97140 MANUAL THERAPY 1/> REGIONS: CPT | Performed by: PHYSICAL THERAPIST

## 2020-01-24 PROCEDURE — 97110 THERAPEUTIC EXERCISES: CPT | Performed by: PHYSICAL THERAPIST
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
## 2020-01-27 ENCOUNTER — OFFICE VISIT (OUTPATIENT)
Dept: PHYSICAL THERAPY | Facility: CLINIC | Age: 80
End: 2020-01-27
Payer: COMMERCIAL

## 2020-01-27 DIAGNOSIS — M48.02 SPINAL STENOSIS OF CERVICAL REGION: Primary | ICD-10-CM

## 2020-01-27 DIAGNOSIS — M54.2 NECK PAIN: ICD-10-CM

## 2020-01-27 PROCEDURE — 97110 THERAPEUTIC EXERCISES: CPT | Performed by: PHYSICAL THERAPIST

## 2020-01-27 PROCEDURE — 97140 MANUAL THERAPY 1/> REGIONS: CPT | Performed by: PHYSICAL THERAPIST

## 2020-01-27 NOTE — PROGRESS NOTES
Daily Note     Today's date: 2020  Patient name: Temo Donald  : 1940  MRN: [id number]  Referring provider: Yana Hughes DO  Dx:   Encounter Diagnosis     ICD-10-CM    1  Spinal stenosis of cervical region M48 02    2  Neck pain M54 2                   Subjective:  Patient reports she feels the arms are stronger but continuation of stiffness in the neck and UB with limitations with movement  Patient reports 4/10 stiffness in the neck and UB at the start of the session  Post session, the patient reports continuation of cervical/UB stiffness to 5/10 level  Objective: See treatment diary below      Assessment: Tolerated treatment well  PT notes continuation of progression of TE program with focus on neck/UB stabilization and manual therapy to decrease pain levels and improve functional limitations to meet therapy goals  PT notes continuation of decrease ROM t/o all movements of the cervical spine with need for continuation of skilled therapy  Plan: Continue per plan of care        Precautions:  No ES or US secondary to possible cancer       Manual      Gentle cervical ROM stretching with STM to the cervical spine and UB  15 min  15 min 15 min 15 min  15 min                                        Exercise Diary      UBE  120 Resist 10 min  resist   10 min    resist  10 min   Alt  110 resist  10 min   Alt  10 min   100 resist   Alt    TB MTP and LTP (Old way)  2x10 ea Red 2x15 Each   Red 2x10 each   Red  2x10 Each   Green  2x10 Each   Green    Scapular pinch into wall  15x 3" hold 2x10 Each   5" Hold  15x3" Hold  2x10   3" Hold  2x10   3" Hold    Scapular wall slides  2x10 IYT IYT only  2x10 Each  2x10 Each   IYT 2x10 Each  IYT 2x10 Each   IYT   TB Bilateral ER and horizontal ABD  2x10 Red 2x10 Each   Red  2x10 Red 2x10 Green  2x10 Green    Doorway pec stretch  5x 15" hd 5x 15" Hold  5x15" Hold  5x15" Hold  5x15" Hold    Caudel glide  3x 15" hd 5x 15" Hold  5x15" Hold  5x15" Hold  Bilat 5x15" Hold  Bilat    Cervical rotation towel stretch  3x 15" hd 5x 15" Hold 3x15" Hold  5x15" Hold  5x15" Hold  Bilat   Supine chin tucks  10x 5" hd 10x 5" Hold  10x   5" Hold  10x5" Hold  15x5" Hold    Supine press and flex        Supine Punch            TB Serratus OH punch  2x10 Green  2x10 Green        Wall push-up  20x                                                    HEP update/review             Modalities 1/17 1/20 1/22 1/24 1/27    MHP to the bilateral UB in seated  15 min  15 min 15 min  15 min  15 min

## 2020-01-29 ENCOUNTER — OFFICE VISIT (OUTPATIENT)
Dept: PHYSICAL THERAPY | Facility: CLINIC | Age: 80
End: 2020-01-29
Payer: COMMERCIAL

## 2020-01-29 DIAGNOSIS — M48.02 SPINAL STENOSIS OF CERVICAL REGION: Primary | ICD-10-CM

## 2020-01-29 DIAGNOSIS — M54.2 NECK PAIN: ICD-10-CM

## 2020-01-29 PROCEDURE — 97110 THERAPEUTIC EXERCISES: CPT

## 2020-01-29 PROCEDURE — 97140 MANUAL THERAPY 1/> REGIONS: CPT

## 2020-01-29 NOTE — PROGRESS NOTES
Daily Note     Today's date: 2020  Patient name: Cyndy Barrera  : 1940  MRN: [id number]  Referring provider: Arminda Albarran DO  Dx:   Encounter Diagnosis     ICD-10-CM    1  Spinal stenosis of cervical region M48 02    2  Neck pain M54 2                   Subjective: patient stated she is feeling good today but is anticipating not feeling well after her first treatment for lung cancer tomorrow  Objective: See treatment diary below      Assessment: Tolerated treatment well  Patient exhibited good technique with therapeutic exercises and would benefit from continued PT      Plan: Continue per plan of care  Progress treatment as tolerated         Precautions:  No ES or US secondary to active cancer       Manual      Gentle cervical ROM stretching with STM to the cervical spine and UB  15 min  15 min 15 min 15 min  15 min                                        Exercise Diary      UBE  100 Resist 10 min  resist   10 min    resist  10 min   Alt  110 resist  10 min   Alt  10 min   100 resist   Alt    TB MTP and LTP (Old way)  2x10 ea green  2x15 Each   Red 2x10 each   Red  2x10 Each   Green  2x10 Each   Green    Scapular pinch into wall  15x 3" hold 2x10 Each   5" Hold  15x3" Hold  2x10   3" Hold  2x10   3" Hold    Scapular wall slides  2x10 each IYT only  2x10 Each  2x10 Each   IYT 2x10 Each  IYT 2x10 Each   IYT   TB Bilateral ER and horizontal ABD  2x10 green  2x10 Each   Red  2x10 Red 2x10 Green  2x10 Green    Doorway pec stretch  5x 15" hd 5x 15" Hold  5x15" Hold  5x15" Hold  5x15" Hold    Caudel glide  5x 15" hd 5x 15" Hold  5x15" Hold  5x15" Hold  Bilat 5x15" Hold  Bilat    Cervical rotation towel stretch  5x 15" hd 5x 15" Hold 3x15" Hold  5x15" Hold  5x15" Hold  Bilat   Supine chin tucks  15x 5" hd 10x 5" Hold  10x   5" Hold  10x5" Hold  15x5" Hold    Supine press and flex        Supine Punch         TB serratus OH punch   TB Serratus OH punch  2x10 Green  2x10 Green    Wall push up     Wall push-up  20x                                                    HEP update/review             Modalities 1/29 1/20 1/22 1/24 1/27    MHP to the bilateral UB in seated  15 min  15 min 15 min  15 min  15 min

## 2020-01-31 ENCOUNTER — OFFICE VISIT (OUTPATIENT)
Dept: PHYSICAL THERAPY | Facility: CLINIC | Age: 80
End: 2020-01-31
Payer: COMMERCIAL

## 2020-01-31 DIAGNOSIS — M48.02 SPINAL STENOSIS OF CERVICAL REGION: Primary | ICD-10-CM

## 2020-01-31 DIAGNOSIS — R42 DIZZINESS: ICD-10-CM

## 2020-01-31 PROCEDURE — 97110 THERAPEUTIC EXERCISES: CPT

## 2020-01-31 PROCEDURE — 97140 MANUAL THERAPY 1/> REGIONS: CPT

## 2020-01-31 PROCEDURE — 97112 NEUROMUSCULAR REEDUCATION: CPT

## 2020-01-31 NOTE — PROGRESS NOTES
Daily Note     Today's date: 2020  Patient name: Javan Vinson  : 1940  MRN: [id number]  Referring provider: Elyse Mcdonald DO  Dx:   Encounter Diagnosis     ICD-10-CM    1  Spinal stenosis of cervical region M48 02                   Subjective:  Patient states"  My neck and shoulder are sore pain level 4/10 "      Objective: See treatment diary below      Assessment: Tolerated treatment well  Patient exhibited good technique with therapeutic exercises  Patient demonstrates improved functional mobility      Plan: Progress treatment as tolerated    Patient demonstrates continued improved gait     Precautions:  No ES or US secondary to active cancer       Manual      Gentle cervical ROM stretching with STM to the cervical spine and UB  15 min  15 min 15 min 15 min  15 min                                        Exercise Diary      UBE  100 Resist 10 min  resist   10 min    resist  10 min   Alt  110 resist  10 min   Alt  10 min   100 resist   Alt    TB MTP and LTP (Old way)  2x10 ea green  2x15 Each   Red 2x10 each   Red  2x10 Each   Green  2x10 Each   Green    Scapular pinch into wall  15x 3" hold 2x10 Each   5" Hold  15x3" Hold  2x10   3" Hold  2x10   3" Hold    Scapular wall slides  2x10 each IYT only  2x10 Each  2x10 Each   IYT 2x10 Each  IYT 2x10 Each   IYT   TB Bilateral ER and horizontal ABD  2x10 green  2x10 Each   Red  2x10 Red 2x10 Green  2x10 Green    Doorway pec stretch  5x 15" hd 5x 15" Hold  5x15" Hold  5x15" Hold  5x15" Hold    Caudel glide  5x 15" hd 5x 15" Hold  5x15" Hold  5x15" Hold  Bilat 5x15" Hold  Bilat    Cervical rotation towel stretch  5x 15" hd 5x 15" Hold 3x15" Hold  5x15" Hold  5x15" Hold  Bilat   Supine chin tucks  15x 5" hd 10x 5" Hold  10x   5" Hold  10x5" Hold  15x5" Hold    Supine press and flex        Supine Punch         TB serratus OH punch   TB Serratus OH punch  2x10 Green  2x10 Green    Wall push up     [place] push-up  20x                                                    HEP update/review             Modalities 1/29 1/31 1/22 1/24 1/27    MHP to the bilateral UB in seated  15 min  15 min 15 min  15 min  15 min

## 2020-02-03 ENCOUNTER — OFFICE VISIT (OUTPATIENT)
Dept: PHYSICAL THERAPY | Facility: CLINIC | Age: 80
End: 2020-02-03
Payer: COMMERCIAL

## 2020-02-03 DIAGNOSIS — M48.02 SPINAL STENOSIS OF CERVICAL REGION: Primary | ICD-10-CM

## 2020-02-03 DIAGNOSIS — R42 DIZZINESS: ICD-10-CM

## 2020-02-03 DIAGNOSIS — M54.2 NECK PAIN: ICD-10-CM

## 2020-02-03 PROCEDURE — 97140 MANUAL THERAPY 1/> REGIONS: CPT

## 2020-02-03 PROCEDURE — 97110 THERAPEUTIC EXERCISES: CPT

## 2020-02-03 NOTE — PROGRESS NOTES
Daily Note     Today's date: 2/3/2020  Patient name: Flako Dukes  : 1940  MRN: [id number]  Referring provider: Digna Gregg DO  Dx:   Encounter Diagnosis     ICD-10-CM    1  Spinal stenosis of cervical region M48 02    2  Dizziness R42    3  Neck pain M54 2                   Subjective: "I had my first round of Chemo on Thursday so I am a little slow and off today"      Objective: See treatment diary below      Assessment: Tolerated treatment well  Patient exhibited good technique with therapeutic exercises  L Rotation is improving steadily, R remains more affected  Patient would benefit from continuation of therapy to improve ROM and decrease spasm and tightness  Plan: Continue per plan of care        Precautions:  No ES or US secondary to active cancer       Manual  1/29 1/31 2/3 1/24 1/27    Gentle cervical ROM stretching with STM to the cervical spine and UB  15 min  15 min 15 min 15 min  15 min                                        Exercise Diary   2/3 1/24 1/27    UBE  100 Resist 10 min  resist   10 min   ALT 90 resist  10 min   Alt  110 resist  10 min   Alt  10 min   100 resist   Alt    TB MTP and LTP (Old way)  2x10 ea green  2x15 Each   Red 2x10 each   Green 2x10 Each   Green  2x10 Each   Green    Scapular pinch into wall  15x 3" hold 2x10 Each   5" Hold  2x10 3" Hold  2x10   3" Hold  2x10   3" Hold    Scapular wall slides  2x10 each IYT only  2x10 Each  2x10 Each   IYT 2x10 Each  IYT 2x10 Each   IYT   TB Bilateral ER and horizontal ABD  2x10 green  2x10 Each   Red  2x10 Green 2x10 Green  2x10 Green    Doorway pec stretch  5x 15" hd 5x 15" Hold  5x15" Hold  5x15" Hold  5x15" Hold    Caudel glide  5x 15" hd 5x 15" Hold  5x15" Hold  5x15" Hold  Bilat 5x15" Hold  Bilat    Cervical rotation towel stretch  5x 15" hd 5x 15" Hold 3x15" Hold  5x15" Hold  5x15" Hold  Bilat   Supine chin tucks  15x 5" hd 10x 5" Hold  10x   5" Hold  10x5" Hold  15x5" Hold    Supine press and flex Supine Punch         TB serratus OH punch   2x10 Green 2x10 Green  2x10 Green    Wall push up    2x10 Wall push-up  20x                                                    HEP update/review             Modalities 1/29 1/31 2/3 1/24 1/27    MHP to the bilateral UB in seated  15 min  15 min 15 min  15 min  15 min

## 2020-02-05 ENCOUNTER — OFFICE VISIT (OUTPATIENT)
Dept: PHYSICAL THERAPY | Facility: CLINIC | Age: 80
End: 2020-02-05
Payer: COMMERCIAL

## 2020-02-05 DIAGNOSIS — R42 DIZZINESS: ICD-10-CM

## 2020-02-05 DIAGNOSIS — M48.02 SPINAL STENOSIS OF CERVICAL REGION: Primary | ICD-10-CM

## 2020-02-05 DIAGNOSIS — M54.2 NECK PAIN: ICD-10-CM

## 2020-02-05 PROCEDURE — 97110 THERAPEUTIC EXERCISES: CPT

## 2020-02-05 PROCEDURE — 97140 MANUAL THERAPY 1/> REGIONS: CPT

## 2020-02-05 NOTE — PROGRESS NOTES
Daily Note     Today's date: 2020  Patient name: Makenzie Oneil  : 1940  MRN: [id number]  Referring provider: Chelsea Mackey DO  Dx:   Encounter Diagnosis     ICD-10-CM    1  Spinal stenosis of cervical region M48 02    2  Dizziness R42    3  Neck pain M54 2                   Subjective: "My neck is horrible today  I took compazine for nausea and I am in reverse today " Decrease in pain noted at conclusion of treatment      Objective: See treatment diary below      Assessment: Tolerated treatment well  Patient demonstrated fatigue post treatment  There is moderate cervical tightness today with reduced ROM  Plan: Continue per plan of care        Precautions:  No ES or US secondary to active cancer       Manual  /3 2/5    Gentle cervical ROM stretching with STM to the cervical spine and UB  15 min  15 min 15 min 15 min                                         Exercise Diary  1/29 1/31 2/3 2/    UBE  100 Resist 10 min  resist   10 min   ALT 90 resist  10 min   Alt  90 resist  10 min   Alt     TB MTP and LTP (Old way)  2x10 ea green  2x15 Each   Red 2x10 each   Green 2x10 ea Green    Scapular pinch into wall  15x 3" hold 2x10 Each   5" Hold  2x10 3" Hold  2x10 3" hold    Scapular wall slides  2x10 each IYT only  2x10 Each  2x10 Each   IYT 2x10 ea IYT    TB Bilateral ER and horizontal ABD  2x10 green  2x10 Each   Red  2x10 Green 2x10 Green    Doorway pec stretch  5x 15" hd 5x 15" Hold  5x15" Hold  5x 15" hd    Caudel glide  5x 15" hd 5x 15" Hold  5x15" Hold  5x 15" hd    Cervical rotation towel stretch  5x 15" hd 5x 15" Hold 3x15" Hold  3x 15" hd     Supine chin tucks  15x 5" hd 10x 5" Hold  10x   5" Hold  10x 5" hold    Supine press and flex        Supine Punch         TB serratus OH punch   2x10 Green 2x10 Green    Wall push up    2x10 2x10                                                    HEP update/review             Modalities 1/29 1/31 2/3 2/5    MHP to the bilateral UB in seated 15 min  15 min 15 min  15 min

## 2020-02-06 ENCOUNTER — APPOINTMENT (OUTPATIENT)
Dept: PHYSICAL THERAPY | Facility: CLINIC | Age: 80
End: 2020-02-06
Payer: COMMERCIAL

## 2020-02-07 ENCOUNTER — OFFICE VISIT (OUTPATIENT)
Dept: PHYSICAL THERAPY | Facility: CLINIC | Age: 80
End: 2020-02-07
Payer: COMMERCIAL

## 2020-02-07 DIAGNOSIS — R42 DIZZINESS: ICD-10-CM

## 2020-02-07 DIAGNOSIS — M54.2 NECK PAIN: ICD-10-CM

## 2020-02-07 DIAGNOSIS — M48.02 SPINAL STENOSIS OF CERVICAL REGION: Primary | ICD-10-CM

## 2020-02-07 PROCEDURE — 97110 THERAPEUTIC EXERCISES: CPT

## 2020-02-07 PROCEDURE — 97140 MANUAL THERAPY 1/> REGIONS: CPT

## 2020-02-07 NOTE — PROGRESS NOTES
Daily Note     Today's date: 2020  Patient name: Svetlana Cobb  : 1940  MRN: [id number]  Referring provider: King Rohini DO  Dx:   Encounter Diagnosis     ICD-10-CM    1  Spinal stenosis of cervical region M48 02    2  Dizziness R42    3  Neck pain M54 2                    Subjective: "Whatever you did last time gave me relief all through the night and fixed my dizziness too"      Objective: See treatment diary below      Assessment: Tolerated treatment well  Patient exhibited good technique with therapeutic exercises  ROM moderately restricted  Patient would benefit from continued therapy to improve ROM to reduce muscular pain & Spasm  Plan: Continue per plan of care        Precautions:  No ES or US secondary to active cancer       Manual  1/29 1/31 2/3 2/5 2/7   Gentle cervical ROM stretching with STM to the cervical spine and UB  15 min  15 min 15 min 15 min  15 min                                       Exercise Diary   2/3 2/5 2/7   UBE  100 Resist 10 min  resist   10 min   ALT 90 resist  10 min   Alt  90 resist  10 min   Alt  90 Resist 10 min ALT   TB MTP and LTP (Old way)  2x10 ea green  2x15 Each   Red 2x10 each   Green 2x10 ea Green 2x10 Green   Scapular pinch into wall  15x 3" hold 2x10 Each   5" Hold  2x10 3" Hold  2x10 3" hold 2x10 3" hd   Scapular wall slides  2x10 each IYT only  2x10 Each  2x10 Each   IYT 2x10 ea IYT 2x10 ea IYT   TB Bilateral ER and horizontal ABD  2x10 green  2x10 Each   Red  2x10 Green 2x10 Green 2x10 Green   Doorway pec stretch  5x 15" hd 5x 15" Hold  5x15" Hold  5x 15" hd 5x 15" hd   Caudel glide  5x 15" hd 5x 15" Hold  5x15" Hold  5x 15" hd 5x 15" hd   Cervical rotation towel stretch  5x 15" hd 5x 15" Hold 3x15" Hold  3x 15" hd  3x 15" hd   Supine chin tucks  15x 5" hd 10x 5" Hold  10x   5" Hold  10x 5" hold 10x 5" hd   Supine press and flex        Supine Punch         TB serratus OH punch   2x10 Green 2x10 Green 2x10 Green   Wall push up    2x10 2x10 2x10                                                   HEP update/review             Modalities 1/29 1/31 2/3 2/5 2/7   MHP to the bilateral UB in seated  15 min  15 min 15 min  15 min  15 min

## 2020-02-10 ENCOUNTER — OFFICE VISIT (OUTPATIENT)
Dept: PHYSICAL THERAPY | Facility: CLINIC | Age: 80
End: 2020-02-10
Payer: COMMERCIAL

## 2020-02-10 DIAGNOSIS — M54.2 NECK PAIN: ICD-10-CM

## 2020-02-10 DIAGNOSIS — M48.02 SPINAL STENOSIS OF CERVICAL REGION: Primary | ICD-10-CM

## 2020-02-10 DIAGNOSIS — R42 DIZZINESS: ICD-10-CM

## 2020-02-10 PROCEDURE — 97140 MANUAL THERAPY 1/> REGIONS: CPT

## 2020-02-10 PROCEDURE — 97110 THERAPEUTIC EXERCISES: CPT

## 2020-02-10 NOTE — PROGRESS NOTES
Daily Note     Today's date: 2/10/2020  Patient name: Brandon Cardenas  : 1940  MRN: [id number]  Referring provider: Jun Alonzo DO  Dx:   Encounter Diagnosis     ICD-10-CM    1  Spinal stenosis of cervical region M48 02    2  Dizziness R42    3  Neck pain M54 2                   Subjective: "If it isn't one thing going on me it's another  This neck is stiff again " Patient reports her symptoms come and go  She is currently receiving chemotherapy and reports some nausea and fatigue  Anti nausea pills affect her balance and pace  Objective: See treatment diary below      Assessment: Tolerated treatment well  Patient exhibited good technique with therapeutic exercises  Patient continues with moderately decreased ROM  Balance and gait appeared more stable today  Plan: Continue per plan of care        Precautions:  No ES or US secondary to active cancer       Manual  2/10 1/31 2/3 2/5 2/7   Gentle cervical ROM stretching with STM to the cervical spine and UB  15 min  15 min 15 min 15 min  15 min                                       Exercise Diary  2/10 1/31 2/3 2/5 2/7   UBE  90 Resist 10 min  resist   10 min   ALT 90 resist  10 min   Alt  90 resist  10 min   Alt  90 Resist 10 min ALT   TB MTP and LTP (Old way)  2x10 Blue 2x15 Each   Red 2x10 each   Green 2x10 ea Green 2x10 Green   Scapular pinch into wall  2x10 5" hd 2x10 Each   5" Hold  2x10 3" Hold  2x10 3" hold 2x10 3" hd   Scapular wall slides  2x10 IYT IYT only  2x10 Each  2x10 Each   IYT 2x10 ea IYT 2x10 ea IYT   TB Bilateral ER and horizontal ABD  2x10 Green 2x10 Each   Red  2x10 Green 2x10 Green 2x10 Green   Doorway pec stretch  5x 15" hd 5x 15" Hold  5x15" Hold  5x 15" hd 5x 15" hd   Caudel glide  5x 15" hd 5x 15" Hold  5x15" Hold  5x 15" hd 5x 15" hd   Cervical rotation towel stretch  5x 15" hd 5x 15" Hold 3x15" Hold  3x 15" hd  3x 15" hd   Supine chin tucks  10x 5" hd 10x 5" Hold  10x   5" Hold  10x 5" hold 10x 5" hd   Supine press and flex        Supine Punch         TB serratus OH punch 2x10 Blue  2x10 Green 2x10 Green 2x10 Green   Wall push up  2x10  2x10 2x10 2x10                                                   HEP update/review             Modalities 2/10 1/31 2/3 2/5 2/7   MHP to the bilateral UB in seated  15 min  15 min 15 min  15 min  15 min

## 2020-02-12 ENCOUNTER — OFFICE VISIT (OUTPATIENT)
Dept: PHYSICAL THERAPY | Facility: CLINIC | Age: 80
End: 2020-02-12
Payer: COMMERCIAL

## 2020-02-12 DIAGNOSIS — R42 DIZZINESS: ICD-10-CM

## 2020-02-12 DIAGNOSIS — M54.2 NECK PAIN: ICD-10-CM

## 2020-02-12 DIAGNOSIS — M48.02 SPINAL STENOSIS OF CERVICAL REGION: Primary | ICD-10-CM

## 2020-02-12 PROCEDURE — 97110 THERAPEUTIC EXERCISES: CPT

## 2020-02-12 PROCEDURE — 97140 MANUAL THERAPY 1/> REGIONS: CPT

## 2020-02-12 NOTE — PROGRESS NOTES
Daily Note     Today's date: 2020  Patient name: Ginna Romero  : 1940  MRN: [id number]  Referring provider: Darryle Barter, DO  Dx:   Encounter Diagnosis     ICD-10-CM    1  Spinal stenosis of cervical region M48 02    2  Dizziness R42    3  Neck pain M54 2        Start Time: 1405          Subjective: patient said she feels like in slow speed today  Patient stated she has fatigue from chemo  She also c/o cervical pain which comes and goes with activity  Pain in 6/10       Objective: See treatment diary below      Assessment: Patient tolerated treatment well  No exacerbation of sx with program  Patient able to complete all tasks assigned with good execution  Patient would benefit from continued therapy to decrease pain and increase strength and flexibility to improve LOF  Plan: Continue per plan of care  Progress treatment as tolerated         Precautions:  No ES or US secondary to active cancer       Manual  2/10 2/12 2/3 2/5 2/7   Gentle cervical ROM stretching with STM to the cervical spine and UB  15 min  15 min 15 min 15 min  15 min                                       Exercise Diary  2/10 2/12 2/3 2/5 2/7   UBE  90 Resist 10 min ALT 90 resist   10 min   ALT 90 resist  10 min   Alt  90 resist  10 min   Alt  90 Resist 10 min ALT   TB MTP and LTP (Old way)  2x10 Blue 3x10 Each   Blue  2x10 each   Green 2x10 ea Green 2x10 Green   Scapular pinch into wall  2x10 5" hd 2x10 Each   5" Hold  2x10 3" Hold  2x10 3" hold 2x10 3" hd   Scapular wall slides  2x10 IYT IYT only  2x10 Each  2x10 Each   IYT 2x10 ea IYT 2x10 ea IYT   TB Bilateral ER and horizontal ABD  2x10 Green 2x10 Each   Blue   2x10 Green 2x10 Green 2x10 Green   Doorway pec stretch  5x 15" hd 5x 15" Hold  5x15" Hold  5x 15" hd 5x 15" hd   Caudel glide  5x 15" hd 5x 15" Hold  5x15" Hold  5x 15" hd 5x 15" hd   Cervical rotation towel stretch  5x 15" hd 5x 15" Hold 3x15" Hold  3x 15" hd  3x 15" hd   Supine chin tucks  10x 5" hd 10x 5" Hold  10x   5" Hold  10x 5" hold 10x 5" hd           Supine Punch         TB serratusSupine press and flex OH punch 2x10 Blue 2x10 blue  2x10 Green 2x10 Green 2x10 Green   Wall push up  2x10 2x10 2x10 2x10 2x10                                                   HEP update/review             Modalities 2/10 2/12 2/3 2/5 2/7   MHP to the bilateral UB in seated  15 min  15 min 15 min  15 min  15 min

## 2020-02-14 ENCOUNTER — OFFICE VISIT (OUTPATIENT)
Dept: PHYSICAL THERAPY | Facility: CLINIC | Age: 80
End: 2020-02-14
Payer: COMMERCIAL

## 2020-02-14 DIAGNOSIS — M54.2 NECK PAIN: ICD-10-CM

## 2020-02-14 DIAGNOSIS — R42 DIZZINESS: ICD-10-CM

## 2020-02-14 DIAGNOSIS — M48.02 SPINAL STENOSIS OF CERVICAL REGION: Primary | ICD-10-CM

## 2020-02-14 PROCEDURE — 97110 THERAPEUTIC EXERCISES: CPT

## 2020-02-14 PROCEDURE — 97140 MANUAL THERAPY 1/> REGIONS: CPT

## 2020-02-14 NOTE — PROGRESS NOTES
Daily Note     Today's date: 2020  Patient name: Makenzie Oneil  : 1940  MRN: [id number]  Referring provider: Chelsea Mackey DO  Dx:   Encounter Diagnosis     ICD-10-CM    1  Spinal stenosis of cervical region M48 02    2  Dizziness R42    3  Neck pain M54 2                   Subjective: "I am feeling pretty well today  I like days like today "      Objective: See treatment diary below      Assessment: Tolerated treatment well  Patient exhibited good technique with therapeutic exercises  No exacerbation of sx with program  Patient able to complete all tasks assigned with good execution  Patient would benefit from continued therapy to decrease pain and increase strength and flexibility to improve LOF  Plan: Continue per plan of care        Precautions:  No ES or US secondary to active cancer       Manual  2/10 2/12 2/14     Gentle cervical ROM stretching with STM to the cervical spine and UB  15 min  15 min 15 min                                         Exercise Diary  2/10 2/12 2/14     UBE  90 Resist 10 min ALT 90 resist   10 min   ALT 90 resist  10 min   Alt      TB MTP and LTP (Old way)  2x10 Blue 3x10 Each   Blue  2x10 each   Blue     Scapular pinch into wall  2x10 5" hd 2x10 Each   5" Hold  2x10 3" Hold      Scapular wall slides  2x10 IYT IYT only  2x10 Each  2x10 Each   IYT     TB Bilateral ER and horizontal ABD  2x10 Green 2x10 Each   Blue   2x10 Blue     Doorway pec stretch  5x 15" hd 5x 15" Hold  5x15" Hold      Caudel glide  5x 15" hd 5x 15" Hold  5x15" Hold      Cervical rotation towel stretch  5x 15" hd 5x 15" Hold 3x15" Hold      Supine chin tucks  10x 5" hd 10x 5" Hold  10x   5" Hold              Supine Punch         TB serratus press and flex OH punch 2x10 Blue 2x10 blue  2x10 Blue     Wall push up  2x10 2x10 2x10                                                     HEP update/review             Modalities 2/10 2/12 2/14     MHP to the bilateral UB in seated  15 min  15 min 15 min

## 2020-02-17 ENCOUNTER — APPOINTMENT (OUTPATIENT)
Dept: LAB | Facility: CLINIC | Age: 80
End: 2020-02-17
Payer: COMMERCIAL

## 2020-02-17 ENCOUNTER — OFFICE VISIT (OUTPATIENT)
Dept: PHYSICAL THERAPY | Facility: CLINIC | Age: 80
End: 2020-02-17
Payer: COMMERCIAL

## 2020-02-17 DIAGNOSIS — R42 DIZZINESS: ICD-10-CM

## 2020-02-17 DIAGNOSIS — M48.02 SPINAL STENOSIS OF CERVICAL REGION: Primary | ICD-10-CM

## 2020-02-17 DIAGNOSIS — C34.11 MALIGNANT NEOPLASM OF UPPER LOBE OF RIGHT LUNG (HCC): ICD-10-CM

## 2020-02-17 DIAGNOSIS — M54.2 NECK PAIN: ICD-10-CM

## 2020-02-17 LAB
ALBUMIN SERPL BCP-MCNC: 3.7 G/DL (ref 3.5–5)
ALP SERPL-CCNC: 109 U/L (ref 46–116)
ALT SERPL W P-5'-P-CCNC: 26 U/L (ref 12–78)
ANION GAP SERPL CALCULATED.3IONS-SCNC: 2 MMOL/L (ref 4–13)
AST SERPL W P-5'-P-CCNC: 19 U/L (ref 5–45)
BASOPHILS # BLD AUTO: 0.04 THOUSANDS/ΜL (ref 0–0.1)
BASOPHILS NFR BLD AUTO: 0 % (ref 0–1)
BILIRUB SERPL-MCNC: 0.4 MG/DL (ref 0.2–1)
BUN SERPL-MCNC: 26 MG/DL (ref 5–25)
CALCIUM SERPL-MCNC: 9.3 MG/DL (ref 8.3–10.1)
CHLORIDE SERPL-SCNC: 104 MMOL/L (ref 100–108)
CO2 SERPL-SCNC: 32 MMOL/L (ref 21–32)
CREAT SERPL-MCNC: 1.04 MG/DL (ref 0.6–1.3)
EOSINOPHIL # BLD AUTO: 0.93 THOUSAND/ΜL (ref 0–0.61)
EOSINOPHIL NFR BLD AUTO: 9 % (ref 0–6)
ERYTHROCYTE [DISTWIDTH] IN BLOOD BY AUTOMATED COUNT: 12.8 % (ref 11.6–15.1)
GFR SERPL CREATININE-BSD FRML MDRD: 52 ML/MIN/1.73SQ M
GLUCOSE P FAST SERPL-MCNC: 142 MG/DL (ref 65–99)
HCT VFR BLD AUTO: 39.8 % (ref 34.8–46.1)
HGB BLD-MCNC: 13.1 G/DL (ref 11.5–15.4)
IMM GRANULOCYTES # BLD AUTO: 0.07 THOUSAND/UL (ref 0–0.2)
IMM GRANULOCYTES NFR BLD AUTO: 1 % (ref 0–2)
LYMPHOCYTES # BLD AUTO: 1.89 THOUSANDS/ΜL (ref 0.6–4.47)
LYMPHOCYTES NFR BLD AUTO: 18 % (ref 14–44)
MCH RBC QN AUTO: 31.2 PG (ref 26.8–34.3)
MCHC RBC AUTO-ENTMCNC: 32.9 G/DL (ref 31.4–37.4)
MCV RBC AUTO: 95 FL (ref 82–98)
MONOCYTES # BLD AUTO: 1.08 THOUSAND/ΜL (ref 0.17–1.22)
MONOCYTES NFR BLD AUTO: 10 % (ref 4–12)
NEUTROPHILS # BLD AUTO: 6.47 THOUSANDS/ΜL (ref 1.85–7.62)
NEUTS SEG NFR BLD AUTO: 62 % (ref 43–75)
NRBC BLD AUTO-RTO: 0 /100 WBCS
PLATELET # BLD AUTO: 281 THOUSANDS/UL (ref 149–390)
PMV BLD AUTO: 9.3 FL (ref 8.9–12.7)
POTASSIUM SERPL-SCNC: 3.7 MMOL/L (ref 3.5–5.3)
PROT SERPL-MCNC: 6.9 G/DL (ref 6.4–8.2)
RBC # BLD AUTO: 4.2 MILLION/UL (ref 3.81–5.12)
SODIUM SERPL-SCNC: 138 MMOL/L (ref 136–145)
WBC # BLD AUTO: 10.48 THOUSAND/UL (ref 4.31–10.16)

## 2020-02-17 PROCEDURE — 97140 MANUAL THERAPY 1/> REGIONS: CPT

## 2020-02-17 PROCEDURE — 80053 COMPREHEN METABOLIC PANEL: CPT

## 2020-02-17 PROCEDURE — 97110 THERAPEUTIC EXERCISES: CPT

## 2020-02-17 PROCEDURE — 36415 COLL VENOUS BLD VENIPUNCTURE: CPT

## 2020-02-17 PROCEDURE — 85025 COMPLETE CBC W/AUTO DIFF WBC: CPT

## 2020-02-17 NOTE — PROGRESS NOTES
Daily Note     Today's date: 2020  Patient name: Kaylah Maloney  : 1940  MRN: [id number]  Referring provider: Kj Palomares DO  Dx:   Encounter Diagnosis     ICD-10-CM    1  Spinal stenosis of cervical region M48 02    2  Dizziness R42    3  Neck pain M54 2                   Subjective: patient stated she wishes her Cervical ROM would come back quicker  She said she feeling like there is improvement but wishes it would progress quicker       Objective: See treatment diary below      Assessment: Tolerated treatment well  Limited cervical ROM all planes  Patient demonstrated fatigue post treatment, exhibited good technique with therapeutic exercises and would benefit from continued PT      Plan: Continue per plan of care  Progress treatment as tolerated         Precautions:  No ES or US secondary to active cancer       Manual  2/10 2/12 2/14 2/17    Gentle cervical ROM stretching with STM to the cervical spine and UB  15 min  15 min 15 min 15 min                                        Exercise Diary  2/10 2/12 2/14 2/17    UBE  90 Resist 10 min ALT 90 resist   10 min   ALT 90 resist  10 min   Alt  90 resist   10 min  Alt     TB MTP and LTP (Old way)  2x10 Blue 3x10 Each   Blue  2x10 each   Blue 2x15  Blue     Scapular pinch into wall  2x10 5" hd 2x10 Each   5" Hold  2x10 3" Hold  2x10 3" hold     Scapular wall slides  2x10 IYT IYT only  2x10 Each  2x10 Each   IYT 2x10 each  IYT    TB Bilateral ER and horizontal ABD  2x10 Green 2x10 Each   Blue   2x10 Blue 2x10 blue     Doorway pec stretch  5x 15" hd 5x 15" Hold  5x15" Hold  5x 15" hold      Caudel glide  5x 15" hd 5x 15" Hold  5x15" Hold  5x 15" hold    Cervical rotation towel stretch  5x 15" hd 5x 15" Hold 3x15" Hold  5x 15" hold     Supine chin tucks  10x 5" hd 10x 5" Hold  10x   5" Hold  15x   5" hold            Supine Punch         TB serratus press and flex OH punch 2x10 Blue 2x10 blue  2x10 Blue 2x10 blue     Wall push up  2x10 2x10 2x10 2x10 HEP update/review             Modalities 2/10 2/12 2/14 2/17    MHP to the bilateral UB in seated  15 min  15 min 15 min  15 min

## 2020-02-19 ENCOUNTER — OFFICE VISIT (OUTPATIENT)
Dept: PHYSICAL THERAPY | Facility: CLINIC | Age: 80
End: 2020-02-19
Payer: COMMERCIAL

## 2020-02-19 DIAGNOSIS — M48.02 SPINAL STENOSIS OF CERVICAL REGION: Primary | ICD-10-CM

## 2020-02-19 DIAGNOSIS — M54.2 NECK PAIN: ICD-10-CM

## 2020-02-19 DIAGNOSIS — R42 DIZZINESS: ICD-10-CM

## 2020-02-19 PROCEDURE — 97110 THERAPEUTIC EXERCISES: CPT

## 2020-02-19 PROCEDURE — 97140 MANUAL THERAPY 1/> REGIONS: CPT

## 2020-02-19 NOTE — PROGRESS NOTES
Daily Note     Today's date: 2020  Patient name: Mauricia Snellen  : 1940  MRN: [id number]  Referring provider: Emir Andrade DO  Dx:   Encounter Diagnosis     ICD-10-CM    1  Spinal stenosis of cervical region M48 02    2  Dizziness R42    3  Neck pain M54 2                   Subjective: patient stated she received a cortisone shot and yesterday was her best day ever  She said she was able to get a lot done       Objective: See treatment diary below      Assessment: patient was guarded today using compensatory movements to assist with cervical rotation cervical rotation improving to leftWill continue to monitor and progress as able  Plan: Continue per plan of care  Progress treatment as tolerated         Precautions:  No ES or US secondary to active cancer       Manual  2/10 2/12 2/14 2/17 2/19   Gentle cervical ROM stretching with STM to the cervical spine and UB  15 min  15 min 15 min 15 min 15 min                                        Exercise Diary  2/10 2/12 2/14 2/17 2/19   UBE  90 Resist 10 min ALT 90 resist   10 min   ALT 90 resist  10 min   Alt  90 resist   10 min  Alt  90 resist  10 min  Alt    TB MTP and LTP ([address])  2x10 Blue 3x10 Each   Blue  2x10 each   Blue 2x15  Blue  2x15  Blue    Scapular pinch into wall  2x10 5" hd 2x10 Each   5" Hold  2x10 3" Hold  2x10 3" hold  2x10 3" hold   Scapular wall slides  2x10 IYT IYT only  2x10 Each  2x10 Each   IYT 2x10 each  IYT 2x10 each  IYT   TB Bilateral ER and horizontal ABD  2x10 Green 2x10 Each   Blue   2x10 Blue 2x10 blue  2x10 blue    Doorway pec stretch  5x 15" hd 5x 15" Hold  5x15" Hold  5x 15" hold   5x 15" hold    Caudel glide  5x 15" hd 5x 15" Hold  5x15" Hold  5x 15" hold 5x 15" hold   Cervical rotation towel stretch  5x 15" hd 5x 15" Hold 3x15" Hold  5x 15" hold  5x 15" hold   Supine chin tucks  10x 5" hd 10x 5" Hold  10x   5" Hold  15x   5" hold 15x  5" hold            Supine Punch         TB serratus press and flex OH punch 2x10 Blue 2x10 blue  2x10 Blue 2x10 blue  2x10 blue    Wall push up  2x10 2x10 2x10 2x10 2x10                                                   HEP update/review             Modalities 2/10 2/12 2/14 2/17 2/19   MHP to the bilateral UB in seated  15 min  15 min 15 min  15 min 15 min

## 2020-02-21 ENCOUNTER — OFFICE VISIT (OUTPATIENT)
Dept: PHYSICAL THERAPY | Facility: CLINIC | Age: 80
End: 2020-02-21
Payer: COMMERCIAL

## 2020-02-21 DIAGNOSIS — R42 DIZZINESS: ICD-10-CM

## 2020-02-21 DIAGNOSIS — M48.02 SPINAL STENOSIS OF CERVICAL REGION: Primary | ICD-10-CM

## 2020-02-21 DIAGNOSIS — M54.2 NECK PAIN: ICD-10-CM

## 2020-02-21 PROCEDURE — 97140 MANUAL THERAPY 1/> REGIONS: CPT

## 2020-02-21 PROCEDURE — 97110 THERAPEUTIC EXERCISES: CPT

## 2020-02-21 NOTE — PROGRESS NOTES
Daily Note     Today's date: 2020  Patient name: Hilda Nicholson  : 1940  MRN: [id number]  Referring provider: Angie La DO  Dx:   Encounter Diagnosis     ICD-10-CM    1  Spinal stenosis of cervical region M48 02    2  Dizziness R42    3  Neck pain M54 2        Start Time: 1010          Subjective: patient stated she had her treatment yesterday and it fatigues her  Objective: See treatment diary below      Assessment: Patient tolerated treatment well  Limited cervical motion all planes  No exacerbation of sx with program  Patient able to complete all tasks assigned with fair execution  Patient would benefit from continued therapy to decrease  increase strength and flexibility to improve LOF  Plan: Continue per plan of care  Progress treatment as tolerated         Precautions:  No ES or US secondary to active cancer       Manual     Gentle cervical ROM stretching with STM to the cervical spine and UB  15 min  15 min 15 min 15 min 15 min                                        Exercise Diary     UBE  90 Resist 10 min ALT 90 resist   10 min   ALT 90 resist  10 min   Alt  90 resist   10 min  Alt  90 resist  10 min  Alt    TB MTP and LTP (Old way)  2x15 Blue 3x10 Each   Blue  2x10 each   Blue 2x15  Blue  2x15  Blue    Scapular pinch into wall  2x15 5" hd 2x10 Each   5" Hold  2x10 3" Hold  2x10 3" hold  2x10 3" hold   Scapular wall slides  2x10 IYT IYT only  2x10 Each  2x10 Each   IYT 2x10 each  IYT 2x10 each  IYT   TB Bilateral ER and horizontal ABD  2x10 blue 2x10 Each   Blue   2x10 Blue 2x10 blue  2x10 blue    Doorway pec stretch  5x 15" hd 5x 15" Hold  5x15" Hold  5x 15" hold   5x 15" hold    Caudel glide  5x 15" hd 5x 15" Hold  5x15" Hold  5x 15" hold 5x 15" hold   Cervical rotation towel stretch  5x 15" hd 5x 15" Hold 3x15" Hold  5x 15" hold  5x 15" hold   Supine chin tucks  15x 5" hd 10x 5" Hold  10x   5" Hold  15x   5" hold 15x  5" hold Supine Punch         TB serratus press and flex OH punch 2x15 Blue 2x10 blue  2x10 Blue 2x10 blue  2x10 blue    Wall push up  2x10 2x10 2x10 2x10 2x10                                                   HEP update/review             Modalities 2/21 2/12 2/14 2/17 2/19   MHP to the bilateral UB in seated  15 min  15 min 15 min  15 min 15 min

## 2020-02-24 ENCOUNTER — EVALUATION (OUTPATIENT)
Dept: PHYSICAL THERAPY | Facility: CLINIC | Age: 80
End: 2020-02-24
Payer: COMMERCIAL

## 2020-02-24 ENCOUNTER — TRANSCRIBE ORDERS (OUTPATIENT)
Dept: PHYSICAL THERAPY | Facility: CLINIC | Age: 80
End: 2020-02-24

## 2020-02-24 DIAGNOSIS — M48.02 SPINAL STENOSIS OF CERVICAL REGION: Primary | ICD-10-CM

## 2020-02-24 DIAGNOSIS — M48.02 SPINAL STENOSIS IN CERVICAL REGION: Primary | ICD-10-CM

## 2020-02-24 DIAGNOSIS — M54.2 NECK PAIN: ICD-10-CM

## 2020-02-24 DIAGNOSIS — R42 DIZZINESS: ICD-10-CM

## 2020-02-24 PROCEDURE — 97110 THERAPEUTIC EXERCISES: CPT

## 2020-02-24 PROCEDURE — 97140 MANUAL THERAPY 1/> REGIONS: CPT

## 2020-02-24 PROCEDURE — 97140 MANUAL THERAPY 1/> REGIONS: CPT | Performed by: PHYSICAL THERAPIST
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
## 2020-02-24 NOTE — PROGRESS NOTES
Daily Note     Today's date: 2020  Patient name: River Serrano  : 1940  MRN: [id number]  Referring provider: Erick Bryant DO  Dx:   Encounter Diagnosis     ICD-10-CM    1  Spinal stenosis of cervical region M48 02    2  Dizziness R42    3  Neck pain M54 2                   Subjective: "My score has gone down on the survey because the chemo treatments flare up my neck  Some times it's my ankles, sometimes my knees, but this weekend it was my neck"      Objective: See treatment diary below      Assessment: Patient was re-evaluated by supervising physical therapist  Griselda Mccallum treatment fair  Patient demonstrated fatigue post treatment   Patient is hard to progress due to patient fatigue and side effect from chemo   Plan: Continue per plan of care        Precautions:  No ES or US secondary to active cancer       Manual     Gentle cervical ROM stretching with STM to the cervical spine and UB  15 min  15 min 15 min 15 min 15 min                                        Exercise Diary     UBE  90 Resist 10 min ALT 90 resist   10 min   ALT 90 resist  10 min   Alt  90 resist   10 min  Alt  90 resist  10 min  Alt    TB MTP and LTP ([address] way)  2x15 Blue 2/15 Each   Blue  2x10 each   Blue 2x15  Blue  2x15  Blue    Scapular pinch into wall  2x15 5" hd 2x10 Each   5" Hold  2x10 3" Hold  2x10 3" hold  2x10 3" hold   Scapular wall slides  2x10 IYT IYT only  2x10 Each  2x10 Each   IYT 2x10 each  IYT 2x10 each  IYT   TB Bilateral ER and horizontal ABD  2x10 blue 2x10 Each   Blue   2x10 Blue 2x10 blue  2x10 blue    Doorway pec stretch  5x 15" hd 5x 15" Hold  5x15" Hold  5x 15" hold   5x 15" hold    Caudel glide  5x 15" hd 5x 15" Hold  5x15" Hold  5x 15" hold 5x 15" hold   Cervical rotation towel stretch  5x 15" hd 5x 15" Hold 3x15" Hold  5x 15" hold  5x 15" hold   Supine chin tucks  15x 5" hd 10x 5" Hold  10x   5" Hold  15x   5" hold 15x  5" hold            Supine Punch TB serratus press and flex OH punch 2x15 Blue 2x10 blue  2x10 Blue 2x10 blue  2x10 blue    Wall push up  2x10 2x10 2x10 2x10 2x10                                                   HEP update/review             Modalities 2/21 2/24 2/14 2/17 2/19   MHP to the bilateral UB in seated  15 min  15 min 15 min  15 min 15 min
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
## 2020-02-26 ENCOUNTER — OFFICE VISIT (OUTPATIENT)
Dept: PHYSICAL THERAPY | Facility: CLINIC | Age: 80
End: 2020-02-26
Payer: COMMERCIAL

## 2020-02-26 DIAGNOSIS — M54.2 NECK PAIN: ICD-10-CM

## 2020-02-26 DIAGNOSIS — R42 DIZZINESS: ICD-10-CM

## 2020-02-26 DIAGNOSIS — M48.02 SPINAL STENOSIS OF CERVICAL REGION: Primary | ICD-10-CM

## 2020-02-26 PROCEDURE — 97110 THERAPEUTIC EXERCISES: CPT

## 2020-02-26 PROCEDURE — 97140 MANUAL THERAPY 1/> REGIONS: CPT

## 2020-02-26 NOTE — PROGRESS NOTES
Daily Note     Today's date: 2020  Patient name: Fernando Roberts  : 1940  MRN: [id number]  Referring provider: Sam Orellana DO  Dx:   Encounter Diagnosis     ICD-10-CM    1  Spinal stenosis of cervical region M48 02    2  Dizziness R42    3  Neck pain M54 2                   Subjective: patient stated she just came back from a long drive and her neck gets stiff from driving in car  Objective: See treatment diary below      Assessment: Tolerated treatment well  Limitation with ROM all planes  Patient demonstrated fatigue post treatment, exhibited good technique with therapeutic exercises and would benefit from continued PT      Plan: Continue per plan of care  Progress treatment as tolerated         Precautions:  No ES or US secondary to possible cancer     Manual     Gentle cervical ROM stretching with STM to the cervical spine and UB  15 min  15 min 15 min 15 min 15 min                                        Exercise Diary     UBE  90 Resist 10 min ALT 90 resist   10 min   ALT 90 resist  10 min   Alt  90 resist   10 min  Alt  90 resist  10 min  Alt    TB MTP and LTP (Old way)  2x15 Blue 2/15 Each   Blue  2x15 each   Blue 2x15  Blue  2x15  Blue    Scapular pinch into wall  2x15 5" hd 2x10 Each   5" Hold  2x10 3" Hold  2x10 3" hold  2x10 3" hold   Scapular wall slides  2x10 IYT IYT only  2x10 Each  2x10 Each   IYT 2x10 each  IYT 2x10 each  IYT   TB Bilateral ER and horizontal ABD  2x10 blue 2x10 Each   Blue   2x10 Blue 2x10 blue  2x10 blue    Doorway pec stretch  5x 15" hd 5x 15" Hold  5x15" Hold  5x 15" hold   5x 15" hold    Caudel glide  5x 15" hd 5x 15" Hold  5x15" Hold  5x 15" hold 5x 15" hold   Cervical rotation towel stretch  5x 15" hd 5x 15" Hold 3x15" Hold  5x 15" hold  5x 15" hold   Supine chin tucks  15x 5" hd 10x 5" Hold  10x   5" Hold  15x   5" hold 15x  5" hold    Levator scap stretch    5x 15" hold      Supine Punch         TB serratus press and flex OH punch 2x15 Blue 2x10 blue  2x15 Blue 2x10 blue  2x10 blue    Wall push up  2x10 2x10 2x10 2x10 2x10                                                   HEP update/review             Modalities 2/21 2/24 2/26 2/17 2/19   MHP to the bilateral UB in seated  15 min  15 min 15 min  15 min 15 min

## 2020-02-28 ENCOUNTER — OFFICE VISIT (OUTPATIENT)
Dept: PHYSICAL THERAPY | Facility: CLINIC | Age: 80
End: 2020-02-28
Payer: COMMERCIAL

## 2020-02-28 DIAGNOSIS — M48.02 SPINAL STENOSIS OF CERVICAL REGION: Primary | ICD-10-CM

## 2020-02-28 DIAGNOSIS — M54.2 NECK PAIN: ICD-10-CM

## 2020-02-28 DIAGNOSIS — R42 DIZZINESS: ICD-10-CM

## 2020-02-28 PROCEDURE — 97140 MANUAL THERAPY 1/> REGIONS: CPT

## 2020-02-28 PROCEDURE — 97110 THERAPEUTIC EXERCISES: CPT

## 2020-02-28 NOTE — PROGRESS NOTES
Daily Note     Today's date: 2020  Patient name: River Serrano  : 1940  MRN: [id number]  Referring provider: Erick Bryant DO  Dx:   Encounter Diagnosis     ICD-10-CM    1  Spinal stenosis of cervical region M48 02    2  Dizziness R42    3  Neck pain M54 2                   Subjective: patient stated she is scheduled for a CT scan next week for f/u      Objective: See treatment diary below      Assessment: Patient tolerated treatment well  No exacerbation of sx with program  Patient continues to present with limitations with cervical spine all planes  Seated rest breaks required t/o session  Patient able to complete all tasks assigned with good execution  Patient would benefit from continued therapy to decrease pain and increase strength and flexibility to improve LOF  Plan: Continue per plan of care  Progress treatment as tolerated         Precautions:  No ES or US secondary to possible cancer     Manual     Gentle cervical ROM stretching with STM to the cervical spine and UB  15 min  15 min 15 min 15 min 15 min                                        Exercise Diary     UBE  90 Resist 10 min ALT 90 resist   10 min   ALT 90 resist  10 min   Alt  90 resist   10 min  Alt  90 resist  10 min  Alt    TB MTP and LTP (Old way)  2x15 Blue 2/15 Each   Blue  2x15 each   Blue 2x15  Blue  2x15  Blue    Scapular pinch into wall  2x15 5" hd 2x10 Each   5" Hold  2x10 3" Hold  2x10 3" hold  2x10 3" hold   Scapular wall slides  2x10 IYT IYT only  2x10 Each  2x10 Each   IYT 2x10 each  IYT 2x10 each  IYT   TB Bilateral ER and horizontal ABD  2x10 blue 2x10 Each   Blue   2x10 Blue 2x10 blue  2x10 blue    Doorway pec stretch  5x 15" hd 5x 15" Hold  5x15" Hold  5x 15" hold   5x 15" hold    Caudel glide  5x 15" hd 5x 15" Hold  5x15" Hold  5x 15" hold 5x 15" hold   Cervical rotation towel stretch  5x 15" hd 5x 15" Hold 3x15" Hold  5x 15" hold  5x 15" hold   Supine chin tucks 15x 5" hd 10x 5" Hold  10x   5" Hold  15x   5" hold 15x  5" hold    Levator scap stretch    5x 15" hold  5x 15" hold     Supine Punch         TB serratus press and flex OH punch 2x15 Blue 2x10 blue  2x15 Blue 2x10 blue  2x10 blue    Wall push up  2x10 2x10 2x10 2x10 2x10                                                   HEP update/review             Modalities 2/21 2/24 2/26 2/28 2/19   MHP to the bilateral UB in seated  15 min  15 min 15 min  15 min 15 min

## 2020-03-02 ENCOUNTER — OFFICE VISIT (OUTPATIENT)
Dept: PHYSICAL THERAPY | Facility: CLINIC | Age: 80
End: 2020-03-02
Payer: COMMERCIAL

## 2020-03-02 DIAGNOSIS — M48.02 SPINAL STENOSIS OF CERVICAL REGION: Primary | ICD-10-CM

## 2020-03-02 DIAGNOSIS — M54.2 NECK PAIN: ICD-10-CM

## 2020-03-02 DIAGNOSIS — R42 DIZZINESS: ICD-10-CM

## 2020-03-02 PROCEDURE — 97110 THERAPEUTIC EXERCISES: CPT

## 2020-03-02 PROCEDURE — 97140 MANUAL THERAPY 1/> REGIONS: CPT

## 2020-03-02 NOTE — PROGRESS NOTES
Daily Note     Today's date: 3/2/2020  Patient name: Lyn Burton  : 1940  MRN: [id number]  Referring provider: Ulysses Severin, DO  Dx:   Encounter Diagnosis     ICD-10-CM    1  Spinal stenosis of cervical region M48 02    2  Dizziness R42    3  Neck pain M54 2                   Subjective: Patient states she sees improvement but continues with some days which are worse than others  Objective: See treatment diary below      Assessment: Tolerated treatment well  Patient exhibited good technique with therapeutic exercises  Patient continues to present with limitations with cervical spine all planes  Seated rest breaks required t/o session  Patient able to complete all tasks assigned with good execution  Patient would benefit from continued therapy to decrease pain and increase strength and flexibility to improve LOF  Plan: Continue per plan of care        Precautions:  No ES or US secondary to possible cancer     Manual   3   Gentle cervical ROM stretching with STM to the cervical spine and UB  15 min  15 min 15 min 15 min 15 min                                        Exercise Diary   3   UBE  90 Resist 10 min ALT 90 resist   10 min   ALT 90 resist  10 min   Alt  90 resist   10 min  Alt  90 Resist 10 min ALT   TB MTP and LTP (Old way)  2x15 Blue 2/15 Each   Blue  2x15 each   Blue 2x15  Blue  2x15 Blue   Scapular pinch into wall  2x15 5" hd 2x10 Each   5" Hold  2x10 3" Hold  2x10 3" hold  2x10 3" hd   Scapular wall slides  2x10 IYT IYT only  2x10 Each  2x10 Each   IYT 2x10 each  IYT 2x10 ea IYT   TB Bilateral ER and horizontal ABD  2x10 blue 2x10 Each   Blue   2x10 Blue 2x10 blue  2x10 Blue   Doorway pec stretch  5x 15" hd 5x 15" Hold  5x15" Hold  5x 15" hold   5x 15' hd   Caudel glide  5x 15" hd 5x 15" Hold  5x15" Hold  5x 15" hold 5x 15" hd   Cervical rotation towel stretch  5x 15" hd 5x 15" Hold 3x15" Hold  5x 15" hold  5x 15" hd   Supine chin tucks  15x 5" hd 10x 5" Hold  10x   5" Hold  15x   5" hold 15x 5" hd   Levator scap stretch    5x 15" hold  5x 15" hold  5x 15" hold   Supine Punch         TB serratus press and flex OH punch 2x15 Blue 2x10 blue  2x15 Blue 2x10 blue  2x10 Blue   Wall push up  2x10 2x10 2x10 2x10 2x10                                                   HEP update/review             Modalities 2/21 2/24 2/26 2/28 3/2   MHP to the bilateral UB in seated  15 min  15 min 15 min  15 min 15 min

## 2020-03-02 NOTE — LETTER
2020    Elizabeth Callaway DO  [address]    Patient: Cheyenne Oleary   YOB: 1940   Date of Visit: 3/2/2020     Encounter Diagnosis     ICD-10-CM    1  Spinal stenosis of cervical region M48 02    2  Dizziness R42    3  Neck pain M54 2        Dear Dr Jose Antonio Kennedy: Thank you for your recent referral of Cheyenne Oleary  Please review the attached discharge summary from [address] [place] [address] recent visit  Please verify that you agree with the plan of care by signing the attached order  If you have any questions or concerns, please do not hesitate to call  I sincerely appreciate the opportunity to share in the care of one of your patients and hope to have another opportunity to work with you in the near future  Sincerely,    Sarah Trent, PT      Referring Provider:      I certify that I have read the below Plan of Care and certify the need for these services furnished under this plan of treatment while under my care  Elizabeth Callaway DO  [address]: [phone]          Daily Note     Today's date: 3/2/2020  Patient name: Hilda Nicholson  : 1940  MRN: [id number]  Referring provider: Angie La DO  Dx:   Encounter Diagnosis     ICD-10-CM    1  Spinal stenosis of cervical region M48 02    2  Dizziness R42    3  Neck pain M54 2                   Subjective: Patient states she sees improvement but continues with some days which are worse than others  Objective: See treatment diary below      Assessment: Tolerated treatment well  Patient exhibited good technique with therapeutic exercises  Patient continues to present with limitations with cervical spine all planes  Seated rest breaks required t/o session  Patient able to complete all tasks assigned with good execution  Patient would benefit from continued therapy to decrease pain and increase strength and flexibility to improve LOF       Plan: Continue per plan of care  Precautions:  No ES or US secondary to possible cancer     Manual   3/2   Gentle cervical ROM stretching with STM to the cervical spine and UB  15 min  15 min 15 min 15 min 15 min                                        Exercise Diary   3/2   UBE  90 Resist 10 min ALT 90 resist   10 min   ALT 90 resist  10 min   Alt  90 resist   10 min  Alt  90 Resist 10 min ALT   TB MTP and LTP (Old way)  2x15 Blue 2/15 Each   Blue  2x15 each   Blue 2x15  Blue  2x15 Blue   Scapular pinch into wall  2x15 5" hd 2x10 Each   5" Hold  2x10 3" Hold  2x10 3" hold  2x10 3" hd   Scapular wall slides  2x10 IYT IYT only  2x10 Each  2x10 Each   IYT 2x10 each  IYT 2x10 ea IYT   TB Bilateral ER and horizontal ABD  2x10 blue 2x10 Each   Blue   2x10 Blue 2x10 blue  2x10 Blue   Doorway pec stretch  5x 15" hd 5x 15" Hold  5x15" Hold  5x 15" hold   5x 15' hd   Caudel glide  5x 15" hd 5x 15" Hold  5x15" Hold  5x 15" hold 5x 15" hd   Cervical rotation towel stretch  5x 15" hd 5x 15" Hold 3x15" Hold  5x 15" hold  5x 15" hd   Supine chin tucks  15x 5" hd 10x 5" Hold  10x   5" Hold  15x   5" hold 15x 5" hd   Levator scap stretch    5x 15" hold  5x 15" hold  5x 15" hold   Supine Punch         TB serratus press and flex OH punch 2x15 Blue 2x10 blue  2x15 Blue 2x10 blue  2x10 Blue   Wall push up  2x10 2x10 2x10 2x10 2x10                                                   HEP update/review             Modalities  3/2   MHP to the bilateral UB in seated  15 min  15 min 15 min  15 min 15 min                                  PT Discharge    Today's date: 3/23/2020  Patient name: Megha Pascual  : 1940  MRN: [id number]  Referring provider: Candy Huertas DO  Dx:   Encounter Diagnosis     ICD-10-CM    1  Spinal stenosis of cervical region M48 02    2  Dizziness R42    3   Neck pain M54 2        Start Time: 1500  Stop Time: 1600  Total time in clinic (min): 60 minutes    Assessment  Assessment details: PT notes decision to DC with HEP secondary to expiration of PT prescription  Impairments: abnormal or restricted ROM, activity intolerance, impaired physical strength, lacks appropriate home exercise program, pain with function and poor posture   Understanding of Dx/Px/POC: good   Prognosis: fair    Goals  ST  Initiate HEP-MET  2  Increase ROM by 25-50%-Partial MET   3  Increase strength by 1-2 mm grades-MET  4  Decrease pain levels by 25-50%-Partial MET  LT  Improve postural awareness-Partial MET  2  Decrease limitations with neck movement-Partial MET  3  Decrease limitations with lifting, carrying and ADL-Partial MET  4   DC with HEP-Progressing     Plan  Plan details: DC with HEP  Patient would benefit from: PT eval and skilled physical therapy  Planned modality interventions: thermotherapy: hydrocollator packs  Planned therapy interventions: manual therapy, neuromuscular re-education, patient education, postural training, self care, strengthening, stretching, therapeutic exercise, home exercise program, graded exercise, functional ROM exercises and flexibility  Frequency: 3x week  Duration in visits: 12  Duration in weeks: 4  Treatment plan discussed with: patient        Subjective Evaluation    History of Present Illness  Date of surgery: 2019  Mechanism of injury: surgery  Mechanism of injury: PT notes the patient with no further contact with clinic to update status or re-schedule appointments      Pain  Current pain ratin  At best pain ratin  At worst pain ratin  Location: Cervical stiffness   Quality: tight and pulling  Relieving factors: rest and medications  Aggravating factors: lifting  Progression: improved      Diagnostic Tests  X-ray: abnormal  CT scan: abnormal  Treatments  Current treatment: physical therapy  Discharged from (in last 30 days): inpatient hospitalization  Patient Goals  Patient goals for therapy: decreased pain, increased motion, increased strength, independence with ADLs/IADLs and return to sport/leisure activities          Objective     Postural Observations  Seated posture: fair  Standing posture: fair    Additional Postural Observation Details  PT notes demonstration of fair UB posture with bilateral rounded shoulders and forward head     Palpation   Left   Muscle spasm in the cervical paraspinals, levator scapulae, rhomboids, suboccipitals and upper trapezius  Tenderness of the cervical paraspinals, levator scapulae, rhomboids, suboccipitals and upper trapezius  Right   Muscle spasm in the cervical paraspinals, levator scapulae, rhomboids, suboccipitals and upper trapezius  Tenderness of the cervical paraspinals, levator scapulae, rhomboids, suboccipitals and upper trapezius       Neurological Testing     Reflexes   Left   Biceps (C5/C6): normal (2+)  Brachioradialis (C6): normal (2+)    Right   Biceps (C5/C6): normal (2+)  Brachioradialis (C6): normal (2+)    Active Range of Motion   Cervical/Thoracic Spine       Cervical    Flexion: 31 degrees   Extension: 24 degrees      Left lateral flexion: 15 degrees      Right lateral flexion: 16 degrees      Left rotation: 44 degrees  Right rotation: 43 degrees           Left Elbow   Normal active range of motion    Additional Active Range of Motion Details  PT notes continuation of decrease ROM and strength t/o the cervical spine and UB   PT notes WFL t/o the left UE but 15% loss of right Shoulder movement    Strength/Myotome Testing   Cervical Spine   Neck extension: 3+  Neck flexion: 3+    Left   Neck lateral flexion (C3): 4-    Right   Neck lateral flexion (C3): 4-    Left Shoulder     Planes of Motion   Flexion: 4+   Extension: 4+   Abduction: 4+   Adduction: 5   External rotation at 0°:  4+   Internal rotation at 0°:  4+     Right Shoulder     Planes of Motion   Flexion: 4   Extension: 4+   Abduction: 4   Adduction: 5   External rotation at 0°:  4+ Internal rotation at 0°:  4+     Left Elbow   Flexion: 5  Extension: 4+    Right Elbow   Flexion: 5  Extension: 4+    General Comments:      Cervical/Thoracic Comments  PT notes well healing surgical scar with no signs of infection       Flowsheet Rows      Most Recent Value   PT/OT G-Codes   Current Score  41   Projected Score  67             Precautions:  No ES or US secondary to possible cancer

## 2020-03-04 ENCOUNTER — APPOINTMENT (OUTPATIENT)
Dept: PHYSICAL THERAPY | Facility: CLINIC | Age: 80
End: 2020-03-04
Payer: COMMERCIAL

## 2020-03-06 ENCOUNTER — APPOINTMENT (OUTPATIENT)
Dept: PHYSICAL THERAPY | Facility: CLINIC | Age: 80
End: 2020-03-06
Payer: COMMERCIAL

## 2020-03-09 ENCOUNTER — TRANSCRIBE ORDERS (OUTPATIENT)
Dept: LAB | Facility: CLINIC | Age: 80
End: 2020-03-09

## 2020-03-09 ENCOUNTER — APPOINTMENT (OUTPATIENT)
Dept: LAB | Facility: CLINIC | Age: 80
End: 2020-03-09
Payer: COMMERCIAL

## 2020-03-09 DIAGNOSIS — C34.11 MALIGNANT NEOPLASM OF UPPER LOBE OF RIGHT LUNG (HCC): Primary | ICD-10-CM

## 2020-03-09 DIAGNOSIS — C34.11 MALIGNANT NEOPLASM OF UPPER LOBE OF RIGHT LUNG (HCC): ICD-10-CM

## 2020-03-09 LAB
ALBUMIN SERPL BCP-MCNC: 3.2 G/DL (ref 3.5–5)
ALP SERPL-CCNC: 126 U/L (ref 46–116)
ALT SERPL W P-5'-P-CCNC: 27 U/L (ref 12–78)
ANION GAP SERPL CALCULATED.3IONS-SCNC: 2 MMOL/L (ref 4–13)
AST SERPL W P-5'-P-CCNC: 25 U/L (ref 5–45)
BASOPHILS # BLD AUTO: 0.1 THOUSANDS/ΜL (ref 0–0.1)
BASOPHILS NFR BLD AUTO: 1 % (ref 0–1)
BILIRUB SERPL-MCNC: 0.24 MG/DL (ref 0.2–1)
BUN SERPL-MCNC: 15 MG/DL (ref 5–25)
CALCIUM SERPL-MCNC: 9.3 MG/DL (ref 8.3–10.1)
CHLORIDE SERPL-SCNC: 105 MMOL/L (ref 100–108)
CO2 SERPL-SCNC: 32 MMOL/L (ref 21–32)
CREAT SERPL-MCNC: 0.98 MG/DL (ref 0.6–1.3)
EOSINOPHIL # BLD AUTO: 3.61 THOUSAND/ΜL (ref 0–0.61)
EOSINOPHIL NFR BLD AUTO: 27 % (ref 0–6)
ERYTHROCYTE [DISTWIDTH] IN BLOOD BY AUTOMATED COUNT: 15.2 % (ref 11.6–15.1)
GFR SERPL CREATININE-BSD FRML MDRD: 55 ML/MIN/1.73SQ M
GLUCOSE SERPL-MCNC: 140 MG/DL (ref 65–140)
HCT VFR BLD AUTO: 36.1 % (ref 34.8–46.1)
HGB BLD-MCNC: 11.4 G/DL (ref 11.5–15.4)
IMM GRANULOCYTES # BLD AUTO: 0.2 THOUSAND/UL (ref 0–0.2)
IMM GRANULOCYTES NFR BLD AUTO: 2 % (ref 0–2)
LYMPHOCYTES # BLD AUTO: 3.11 THOUSANDS/ΜL (ref 0.6–4.47)
LYMPHOCYTES NFR BLD AUTO: 23 % (ref 14–44)
MCH RBC QN AUTO: 31.3 PG (ref 26.8–34.3)
MCHC RBC AUTO-ENTMCNC: 31.6 G/DL (ref 31.4–37.4)
MCV RBC AUTO: 99 FL (ref 82–98)
MONOCYTES # BLD AUTO: 1.39 THOUSAND/ΜL (ref 0.17–1.22)
MONOCYTES NFR BLD AUTO: 10 % (ref 4–12)
NEUTROPHILS # BLD AUTO: 5.11 THOUSANDS/ΜL (ref 1.85–7.62)
NEUTS SEG NFR BLD AUTO: 37 % (ref 43–75)
NRBC BLD AUTO-RTO: 0 /100 WBCS
PLATELET # BLD AUTO: 305 THOUSANDS/UL (ref 149–390)
PMV BLD AUTO: 9.1 FL (ref 8.9–12.7)
POTASSIUM SERPL-SCNC: 3.5 MMOL/L (ref 3.5–5.3)
PROT SERPL-MCNC: 7.4 G/DL (ref 6.4–8.2)
RBC # BLD AUTO: 3.64 MILLION/UL (ref 3.81–5.12)
SODIUM SERPL-SCNC: 139 MMOL/L (ref 136–145)
T3FREE SERPL-MCNC: 1.02 PG/ML (ref 2.3–4.2)
T4 FREE SERPL-MCNC: 1.03 NG/DL (ref 0.76–1.46)
TSH SERPL DL<=0.05 MIU/L-ACNC: 20.9 UIU/ML (ref 0.36–3.74)
WBC # BLD AUTO: 13.52 THOUSAND/UL (ref 4.31–10.16)

## 2020-03-09 PROCEDURE — 84481 FREE ASSAY (FT-3): CPT

## 2020-03-09 PROCEDURE — 36415 COLL VENOUS BLD VENIPUNCTURE: CPT

## 2020-03-09 PROCEDURE — 85025 COMPLETE CBC W/AUTO DIFF WBC: CPT

## 2020-03-09 PROCEDURE — 84443 ASSAY THYROID STIM HORMONE: CPT

## 2020-03-09 PROCEDURE — 80053 COMPREHEN METABOLIC PANEL: CPT

## 2020-03-09 PROCEDURE — 84439 ASSAY OF FREE THYROXINE: CPT

## 2020-03-16 ENCOUNTER — TRANSCRIBE ORDERS (OUTPATIENT)
Dept: LAB | Facility: CLINIC | Age: 80
End: 2020-03-16

## 2020-03-16 ENCOUNTER — APPOINTMENT (OUTPATIENT)
Dept: LAB | Facility: CLINIC | Age: 80
End: 2020-03-16
Payer: COMMERCIAL

## 2020-03-16 DIAGNOSIS — C34.11 MALIGNANT NEOPLASM OF UPPER LOBE OF RIGHT LUNG (HCC): Primary | ICD-10-CM

## 2020-03-16 LAB — PROT UR-MCNC: 14 MG/DL

## 2020-03-16 PROCEDURE — 84156 ASSAY OF PROTEIN URINE: CPT

## 2020-03-23 ENCOUNTER — TRANSCRIBE ORDERS (OUTPATIENT)
Dept: PHYSICAL THERAPY | Facility: CLINIC | Age: 80
End: 2020-03-23

## 2020-03-23 DIAGNOSIS — M48.02 SPINAL STENOSIS OF CERVICAL REGION: Primary | ICD-10-CM

## 2020-03-23 NOTE — PROGRESS NOTES
PT Discharge    Today's date: 3/23/2020  Patient name: Vicky Hdz  : 1940  MRN: [id number]  Referring provider: Matthew Beckman DO  Dx:   Encounter Diagnosis     ICD-10-CM    1  Spinal stenosis of cervical region M48 02    2  Dizziness R42    3  Neck pain M54 2        Start Time: 1500  Stop Time: 1600  Total time in clinic (min): 60 minutes    Assessment  Assessment details: PT notes decision to DC with HEP secondary to expiration of PT prescription  Impairments: abnormal or restricted ROM, activity intolerance, impaired physical strength, lacks appropriate home exercise program, pain with function and poor posture   Understanding of Dx/Px/POC: good   Prognosis: fair    Goals  ST  Initiate HEP-MET  2  Increase ROM by 25-50%-Partial MET   3  Increase strength by 1-2 mm grades-MET  4  Decrease pain levels by 25-50%-Partial MET  LT  Improve postural awareness-Partial MET  2  Decrease limitations with neck movement-Partial MET  3  Decrease limitations with lifting, carrying and ADL-Partial MET  4   DC with HEP-Progressing     Plan  Plan details: DC with HEP  Patient would benefit from: PT eval and skilled physical therapy  Planned modality interventions: thermotherapy: hydrocollator packs  Planned therapy interventions: manual therapy, neuromuscular re-education, patient education, postural training, self care, strengthening, stretching, therapeutic exercise, home exercise program, graded exercise, functional ROM exercises and flexibility  Frequency: 3x week  Duration in visits: 12  Duration in weeks: 4  Treatment plan discussed with: patient        Subjective Evaluation    History of Present Illness  Date of surgery: 2019  Mechanism of injury: surgery  Mechanism of injury: PT notes the patient with no further contact with clinic to update status or re-schedule appointments      Pain  Current pain ratin  At best pain ratin  At worst pain ratin  Location: Cervical stiffness   Quality: tight and pulling  Relieving factors: rest and medications  Aggravating factors: lifting  Progression: improved      Diagnostic Tests  X-ray: abnormal  CT scan: abnormal  Treatments  Current treatment: physical therapy  Discharged from (in last 30 days): inpatient hospitalization  Patient Goals  Patient goals for therapy: decreased pain, increased motion, increased strength, independence with ADLs/IADLs and return to sport/leisure activities          Objective     Postural Observations  Seated posture: fair  Standing posture: fair    Additional Postural Observation Details  PT notes demonstration of fair UB posture with bilateral rounded shoulders and forward head     Palpation   Left   Muscle spasm in the cervical paraspinals, levator scapulae, rhomboids, suboccipitals and upper trapezius  Tenderness of the cervical paraspinals, levator scapulae, rhomboids, suboccipitals and upper trapezius  Right   Muscle spasm in the cervical paraspinals, levator scapulae, rhomboids, suboccipitals and upper trapezius  Tenderness of the cervical paraspinals, levator scapulae, rhomboids, suboccipitals and upper trapezius       Neurological Testing     Reflexes   Left   Biceps (C5/C6): normal (2+)  Brachioradialis (C6): normal (2+)    Right   Biceps (C5/C6): normal (2+)  Brachioradialis (C6): normal (2+)    Active Range of Motion   Cervical/Thoracic Spine       Cervical    Flexion: 31 degrees   Extension: 24 degrees      Left lateral flexion: 15 degrees      Right lateral flexion: 16 degrees      Left rotation: 44 degrees  Right rotation: 43 degrees           Left Elbow   Normal active range of motion    Additional Active Range of Motion Details  PT notes continuation of decrease ROM and strength t/o the cervical spine and UB   PT notes WFL t/o the left UE but 15% loss of right Shoulder movement    Strength/Myotome Testing   Cervical Spine   Neck extension: 3+  Neck flexion: 3+    Left   Neck lateral flexion (C3): 4-    Right   Neck lateral flexion (C3): 4-    Left Shoulder     Planes of Motion   Flexion: 4+   Extension: 4+   Abduction: 4+   Adduction: 5   External rotation at 0°: 4+   Internal rotation at 0°: 4+     Right Shoulder     Planes of Motion   Flexion: 4   Extension: 4+   Abduction: 4   Adduction: 5   External rotation at 0°: 4+   Internal rotation at 0°: 4+     Left Elbow   Flexion: 5  Extension: 4+    Right Elbow   Flexion: 5  Extension: 4+    General Comments:      Cervical/Thoracic Comments  PT notes well healing surgical scar with no signs of infection       Flowsheet Rows      Most Recent Value   PT/OT G-Codes   Current Score  41   Projected Score  67             Precautions:  No ES or US secondary to possible cancer

## 2021-05-12 ENCOUNTER — TRANSCRIBE ORDERS (OUTPATIENT)
Dept: PHYSICAL THERAPY | Facility: CLINIC | Age: 81
End: 2021-05-12

## 2021-05-12 ENCOUNTER — EVALUATION (OUTPATIENT)
Dept: PHYSICAL THERAPY | Facility: CLINIC | Age: 81
End: 2021-05-12
Payer: COMMERCIAL

## 2021-05-12 DIAGNOSIS — M25.562 CHRONIC PAIN OF LEFT KNEE: Primary | ICD-10-CM

## 2021-05-12 DIAGNOSIS — G89.29 CHRONIC PAIN OF LEFT KNEE: Primary | ICD-10-CM

## 2021-05-12 DIAGNOSIS — M17.12 PRIMARY OSTEOARTHRITIS OF LEFT KNEE: ICD-10-CM

## 2021-05-12 PROCEDURE — 97535 SELF CARE MNGMENT TRAINING: CPT | Performed by: PHYSICAL THERAPIST

## 2021-05-12 PROCEDURE — 97162 PT EVAL MOD COMPLEX 30 MIN: CPT | Performed by: PHYSICAL THERAPIST

## 2021-05-12 NOTE — LETTER
May 12, 2021    Lissa Becky, [address] [facility]  [address] [place] [address]    Patient: Madai De La Cruz   YOB: 1940   Date of Visit: 2021     Encounter Diagnosis     ICD-10-CM    1  Chronic pain of left knee  M25 562     G89 29    2  Primary osteoarthritis of left knee  M17 12        Dear Dr Lily Kelley:    Thank you for your recent referral of Madai De La Cruz  Please review the attached evaluation summary from [address] recent visit  Please verify that you agree with the plan of care by signing the attached order  If you have any questions or concerns, please do not hesitate to call  I sincerely appreciate the opportunity to share in the care of one of your patients and hope to have another opportunity to work with you in the near future  Sincerely,    Emelyn Benson      Referring Provider:      I certify that I have read the below Plan of Care and certify the need for these services furnished under this plan of treatment while under my care  Lissa Pena, [address] [address]  Via Fax: [phone]          PT Evaluation     Today's date: 2021  Patient name: Madai De La Cruz  : 1940  MRN: [id number]  Referring provider: Nilson Dickson MD  Dx:   Encounter Diagnosis     ICD-10-CM    1  Chronic pain of left knee  M25 562     G89 29    2  Primary osteoarthritis of left knee  M17 12                   Assessment  Assessment details: Patient was found to have gross strength deficits in the LE as well as decreased active and passive ROM in the L knee and hip and the R hip  Pain in the medial L knee may have limited motion  Course of skilled therapy may be prolonged due to comprehensive PMH  Patient likely to need 6 weeks of PT twice a weak to address aforementioned deficits through strengthening, flexibility, and modalities   PT recommends patient does follow up with orthopaedic MD  PT feel that patient would be a good candidate for joint supplement injections  Evaluation and exercises performed by Jose Miguel Egan SPT with supervision from Samm Garcia DPHAYDER  Impairments: abnormal gait, abnormal or restricted ROM, activity intolerance, impaired balance, impaired physical strength, lacks appropriate home exercise program, pain with function and poor posture   Understanding of Dx/Px/POC: good   Prognosis: fair    Goals  ST  Patient will have increased ROM in the hip and knee by 20-30%   2  Patient will have increased gross strength in the LE by 1-2 MMT grades  3  Patient will have decreased pain 1-2 on VAS  4  Initiate HEP    LT  Patient will have less difficulty with stair climbing  2  Patient will have less difficulty with ascending and descending curbs  3  Patient will have less difficulty with rising from chairs   4  Discharge with HEP    Plan  Plan details: POC discussed with patient who was motivated to comply  Patient would benefit from: skilled physical therapy and PT eval  Planned modality interventions: cryotherapy and thermotherapy: hydrocollator packs  Planned therapy interventions: patient education, postural training, strengthening, stretching, therapeutic activities, therapeutic exercise, transfer training, home exercise program, gait training, functional ROM exercises, flexibility, coordination, balance and manual therapy  Frequency: 2x week  Duration in weeks: 6  Treatment plan discussed with: patient        Subjective Evaluation    History of Present Illness  Mechanism of injury: Patient reports gradual onset knee pain starting about 2 months ago when climbing stairs and getting in and out of chairs  She thought her knee pain was related to infusion treatments she has been undergoing for lung cancer treatment  PMH significant for lung cancer, OA, and HTN  (controlled)  Patient is not working     Pain  Current pain ratin  At best pain ratin  At worst pain rating: 10  Location: L medial knee, joint line, patella, and medial tibial plateau and medial femoral condyle  Quality: burning  Relieving factors: rest  Aggravating factors: stair climbing and walking      Diagnostic Tests  X-ray: abnormal  Treatments  Current treatment: medication and physical therapy  Patient Goals  Patient goals for therapy: decreased pain, increased strength, improved balance, independence with ADLs/IADLs and return to sport/leisure activities          Objective     Palpation   Left   Muscle spasm in the iliopsoas  Tenderness of the iliopsoas and rectus femoris  Additional Palpation Details  Tenderness in the rectus femoris just superior to the patella     Tenderness     Left Hip   Tenderness in the greater trochanter  No tenderness in the PSIS and iliac crest    Left Knee   Tenderness in the inferior patella, medial joint line, medial patella, medial retinaculum, patellar tendon, pes anserinus, quadriceps tendon and superior patella  No tenderness in the MCL (distal) and MCL (proximal)  Neurological Testing     Reflexes   Left   Patellar (L4): normal (2+)  Achilles (S1): normal (2+)    Right   Patellar (L4): normal (2+)  Achilles (S1): normal (2+)    Active Range of Motion   Left Hip   Flexion: 20 degrees   External rotation (90/90): 0 degrees   Internal rotation (90/90): 0 degrees     Right Hip   Flexion: 75 degrees   Left Knee   Flexion: 115 degrees   Extension: -5 degrees     Right Knee   Flexion: WFL    Passive Range of Motion   Left Hip   Flexion: 51 degrees   External rotation (90/90): 5 degrees   Internal rotation (90/90): 0 degrees     Right Hip   Flexion: 78 degrees   Left Knee   Flexion: 135 degrees   Extension: 0 degrees     Mobility   Patellar Mobility:   Left Knee   WFL: medial, lateral, superior and inferior       Additional Mobility Details  Superior/Inferior motion produced pain     Strength/Myotome Testing     Left Hip   Planes of Motion   Flexion: 3-  Extension: 4  Abduction: 4-  Adduction: 3  External rotation: 3  Internal rotation: 3    Right Hip   Planes of Motion   Flexion: 4  Extension: 5  Abduction: 4-  Adduction: 3  External rotation: 3  Internal rotation: 3    Left Knee   Flexion: 3+  Extension: 4-    Right Knee   Flexion: 4  Extension: 4    Tests     Left Knee   Positive valgus stress test at 30 degrees  Negative varus stress test at 30 degrees  Additional Tests Details  Grade 1 Valgus Stress Test     Ambulation     Ambulation: Level Surfaces   Ambulation without assistive device: independent    Additional Level Surfaces Ambulation Details  Patient ambulates independently on level surfaces with decreased eric, decreased step length, decreased knee/hip flexion, decreased foot clearance, decreased push off, flat foot contact and increased stance time on R LE  Precautions: Active lung cancer treatment, OA       Manuals 5/12       Iliopsoas, quadricept, hamstring, ABD, gastroc PROM                                  Neuro Re-Ed        [address] [facility]        Monster walk        Side Step and Squat         Tandem Walk         Standing SLR 3-way                         Ther Ex        Long arc quad         Heel slides in supine         Bridge         Stair heel and toe raises        Front and Lat step up 4"                        HEP 15 min        Ther Activity                        Gait Training                        Modalities        Cold Pack to L knee in seated PRN       Hot Pack to L knee in seated  15 min             Attestation signed by Elkin Peraza PT at 5/12/2021  1:13 PM:  I supervised the visit  We discussed the case to ensure appropriate continuation and progression of care and I reviewed the documentation

## 2021-05-12 NOTE — PROGRESS NOTES
PT Evaluation     Today's date: 2021  Patient name: Diamond Roca  : 1940  MRN: [id number]  Referring provider: Aarti Desai MD  Dx:   Encounter Diagnosis     ICD-10-CM    1  Chronic pain of left knee  M25 562     G89 29    2  Primary osteoarthritis of left knee  M17 12                   Assessment  Assessment details: Patient was found to have gross strength deficits in the LE as well as decreased active and passive ROM in the L knee and hip and the R hip  Pain in the medial L knee may have limited motion  Course of skilled therapy may be prolonged due to comprehensive PMH  Patient likely to need 6 weeks of PT twice a weak to address aforementioned deficits through strengthening, flexibility, and modalities  PT recommends patient does follow up with orthopaedic MD  PT feel that patient would be a good candidate for joint supplement injections  Evaluation and exercises performed by Gayle JONES with supervision from Samm Garcia DPT  Impairments: abnormal gait, abnormal or restricted ROM, activity intolerance, impaired balance, impaired physical strength, lacks appropriate home exercise program, pain with function and poor posture   Understanding of Dx/Px/POC: good   Prognosis: fair    Goals  ST  Patient will have increased ROM in the hip and knee by 20-30%   2  Patient will have increased gross strength in the LE by 1-2 MMT grades  3  Patient will have decreased pain 1-2 on VAS  4  Initiate HEP    LT  Patient will have less difficulty with stair climbing  2  Patient will have less difficulty with ascending and descending curbs  3  Patient will have less difficulty with rising from chairs   4  Discharge with HEP    Plan  Plan details: POC discussed with patient who was motivated to comply     Patient would benefit from: skilled physical therapy and PT eval  Planned modality interventions: cryotherapy and thermotherapy: hydrocollator packs  Planned therapy interventions: patient education, postural training, strengthening, stretching, therapeutic activities, therapeutic exercise, transfer training, home exercise program, gait training, functional ROM exercises, flexibility, coordination, balance and manual therapy  Frequency: 2x week  Duration in weeks: 6  Treatment plan discussed with: patient        Subjective Evaluation    History of Present Illness  Mechanism of injury: Patient reports gradual onset knee pain starting about 2 months ago when climbing stairs and getting in and out of chairs  She thought her knee pain was related to infusion treatments she has been undergoing for lung cancer treatment  PMH significant for lung cancer, OA, and HTN  (controlled)  Patient is not working  Pain  Current pain ratin  At best pain ratin  At worst pain rating: 10  Location: L medial knee, joint line, patella, and medial tibial plateau and medial femoral condyle  Quality: burning  Relieving factors: rest  Aggravating factors: stair climbing and walking      Diagnostic Tests  X-ray: abnormal  Treatments  Current treatment: medication and physical therapy  Patient Goals  Patient goals for therapy: decreased pain, increased strength, improved balance, independence with ADLs/IADLs and return to sport/leisure activities          Objective     Palpation   Left   Muscle spasm in the iliopsoas  Tenderness of the iliopsoas and rectus femoris  Additional Palpation Details  Tenderness in the rectus femoris just superior to the patella     Tenderness     Left Hip   Tenderness in the greater trochanter  No tenderness in the PSIS and iliac crest    Left Knee   Tenderness in the inferior patella, medial joint line, medial patella, medial retinaculum, patellar tendon, pes anserinus, quadriceps tendon and superior patella  No tenderness in the MCL (distal) and MCL (proximal)       Neurological Testing     Reflexes   Left   Patellar (L4): normal (2+)  Achilles (S1): normal (2+)    Right   Patellar (L4): normal (2+)  Achilles (S1): normal (2+)    Active Range of Motion   Left Hip   Flexion: 20 degrees   External rotation (90/90): 0 degrees   Internal rotation (90/90): 0 degrees     Right Hip   Flexion: 75 degrees   Left Knee   Flexion: 115 degrees   Extension: -5 degrees     Right Knee   Flexion: WFL    Passive Range of Motion   Left Hip   Flexion: 51 degrees   External rotation (90/90): 5 degrees   Internal rotation (90/90): 0 degrees     Right Hip   Flexion: 78 degrees   Left Knee   Flexion: 135 degrees   Extension: 0 degrees     Mobility   Patellar Mobility:   Left Knee   WFL: medial, lateral, superior and inferior  Additional Mobility Details  Superior/Inferior motion produced pain     Strength/Myotome Testing     Left Hip   Planes of Motion   Flexion: 3-  Extension: 4  Abduction: 4-  Adduction: 3  External rotation: 3  Internal rotation: 3    Right Hip   Planes of Motion   Flexion: 4  Extension: 5  Abduction: 4-  Adduction: 3  External rotation: 3  Internal rotation: 3    Left Knee   Flexion: 3+  Extension: 4-    Right Knee   Flexion: 4  Extension: 4    Tests     Left Knee   Positive valgus stress test at 30 degrees  Negative varus stress test at 30 degrees  Additional Tests Details  Grade 1 Valgus Stress Test     Ambulation     Ambulation: Level Surfaces   Ambulation without assistive device: independent    Additional Level Surfaces Ambulation Details  Patient ambulates independently on level surfaces with decreased eric, decreased step length, decreased knee/hip flexion, decreased foot clearance, decreased push off, flat foot contact and increased stance time on R LE  Precautions:  Active lung cancer treatment, OA       Manuals 5/12       Iliopsoas, quadricept, hamstring, ABD, gastroc PROM                                  Neuro Re-Ed        [address] [facility]        Monster walk        Side Step and Squat         Tandem Walk         Standing SLR 3-way                         Ther Ex Long arc quad         Heel slides in supine         Bridge         Stair heel and toe raises        Front and Lat step up 4"                        HEP 15 min        Ther Activity                        Gait Training                        Modalities        Cold Pack to L knee in seated PRN       Hot Pack to L knee in seated  15 min

## 2021-05-14 ENCOUNTER — OFFICE VISIT (OUTPATIENT)
Dept: PHYSICAL THERAPY | Facility: CLINIC | Age: 81
End: 2021-05-14
Payer: COMMERCIAL

## 2021-05-14 DIAGNOSIS — M17.12 PRIMARY OSTEOARTHRITIS OF LEFT KNEE: ICD-10-CM

## 2021-05-14 DIAGNOSIS — M25.562 ACUTE PAIN OF LEFT KNEE: Primary | ICD-10-CM

## 2021-05-14 PROCEDURE — 97140 MANUAL THERAPY 1/> REGIONS: CPT | Performed by: PHYSICAL THERAPIST

## 2021-05-14 PROCEDURE — 97110 THERAPEUTIC EXERCISES: CPT | Performed by: PHYSICAL THERAPIST

## 2021-05-14 NOTE — PROGRESS NOTES
Daily Note     Today's date: 2021  Patient name: Cyndy King  : 1940  MRN: [id number]  Referring provider: Loren Mcmillan MD  Dx:   Encounter Diagnosis     ICD-10-CM    1  Acute pain of left knee  M25 562    2  Primary osteoarthritis of left knee  M17 12                   Subjective: Patient reports stiffness in the morning with 8/10 pain that decreased after warming up on the bike  Objective: See treatment diary below      Assessment: Tolerated treatment fair  Patient exhibited good technique with therapeutic exercises      Plan: Continue per plan of care  Precautions:  Active lung cancer treatment, OA       Manuals       Iliopsoas, quadricept, hamstring, ABD, gastroc PROM    15 min                               Neuro Re-Ed        Nu-Step   10 min L3       Monster walk  4x10 feet      Side Step and Squat   2x10 feet      Tandem Walk   4x10 feet       Standing SLR 3-way   10x bilat                      Ther Ex        Long arc quad   10x bilat      Heel slides in supine         Bridge         Stair heel and toe raises        Front and Lat step and heel tap down up 4"  10x each bilat      Seated march  10x bilat               HEP 15 min        Ther Activity                        Gait Training                        Modalities        Cold Pack to L knee in seated PRN       Hot Pack to L knee in seated  15 min  15 min

## 2021-05-17 ENCOUNTER — OFFICE VISIT (OUTPATIENT)
Dept: PHYSICAL THERAPY | Facility: CLINIC | Age: 81
End: 2021-05-17
Payer: COMMERCIAL

## 2021-05-17 DIAGNOSIS — M17.12 PRIMARY OSTEOARTHRITIS OF LEFT KNEE: ICD-10-CM

## 2021-05-17 DIAGNOSIS — M25.562 ACUTE PAIN OF LEFT KNEE: Primary | ICD-10-CM

## 2021-05-17 PROCEDURE — 97140 MANUAL THERAPY 1/> REGIONS: CPT | Performed by: PHYSICAL THERAPIST

## 2021-05-17 PROCEDURE — 97110 THERAPEUTIC EXERCISES: CPT | Performed by: PHYSICAL THERAPIST

## 2021-05-17 NOTE — PROGRESS NOTES
Daily Note     Today's date: 2021  Patient name: Lester Mao  : 1940  MRN: [id number]  Referring provider: Jyothi Jurado MD  Dx:   Encounter Diagnosis     ICD-10-CM    1  Acute pain of left knee  M25 562    2  Primary osteoarthritis of left knee  M17 12                   Subjective: 7/10 pain aching distal to the patella and in the quadricepts  She will see her Doctor tomorrow  Objective: See treatment diary below      Assessment: Tolerated treatment well  Patient exhibited good technique with therapeutic exercises and would benefit from continued PT to address strength deficits  Unable to find a comfortable position for quadricepts stretch to that was not performed  Exercises and manual therapy performed by Greta JONES with supervision from Shakira KENDRICKT  Plan: Continue per plan of care  Precautions:  Active lung cancer treatment, OA       Manuals      Iliopsoas, quadricept, hamstring, ABD, gastroc PROM    15 min  15 min not quadricepts                             Neuro Re-Ed        Nu-Step   10 min L3  10 min L3     Monster walk  4x10 feet 8x10 feet     Side Step and Squat   2x10 feet 6x10 feet      Tandem Walk   4x10 feet  8x10 feet      Standing SLR 3-way   10x bilat 10x bilat                      Ther Ex        Long arc quad   10x bilat 10x bilat      Heel slides in supine         Bridge         Stair heel and toe raises        Front and Lat step and heel tap down up 4"  10x each bilat 10x each bilat front and lateral only      Seated march  10x bilat  10x bilat              HEP 15 min        Ther Activity                        Gait Training                        Modalities        Cold Pack to L knee in seated PRN  15 min      Hot Pack to L knee in seated  15 min  15 min Patient information on fall and injury prevention

## 2021-05-20 ENCOUNTER — OFFICE VISIT (OUTPATIENT)
Dept: PHYSICAL THERAPY | Facility: CLINIC | Age: 81
End: 2021-05-20
Payer: COMMERCIAL

## 2021-05-20 DIAGNOSIS — M25.562 CHRONIC PAIN OF LEFT KNEE: ICD-10-CM

## 2021-05-20 DIAGNOSIS — G89.29 CHRONIC PAIN OF LEFT KNEE: ICD-10-CM

## 2021-05-20 DIAGNOSIS — M25.562 ACUTE PAIN OF LEFT KNEE: Primary | ICD-10-CM

## 2021-05-20 DIAGNOSIS — M17.12 PRIMARY OSTEOARTHRITIS OF LEFT KNEE: ICD-10-CM

## 2021-05-20 PROCEDURE — 97112 NEUROMUSCULAR REEDUCATION: CPT | Performed by: PHYSICAL THERAPIST

## 2021-05-20 PROCEDURE — 97140 MANUAL THERAPY 1/> REGIONS: CPT | Performed by: PHYSICAL THERAPIST

## 2021-05-20 PROCEDURE — 97110 THERAPEUTIC EXERCISES: CPT | Performed by: PHYSICAL THERAPIST

## 2021-05-20 NOTE — PROGRESS NOTES
Daily Note     Today's date: 2021  Patient name: Lester Mao  : 1940  MRN: [id number]  Referring provider: Jyothi Jurado MD  Dx:   Encounter Diagnosis     ICD-10-CM    1  Acute pain of left knee  M25 562    2  Primary osteoarthritis of left knee  M17 12    3  Chronic pain of left knee  M25 562     G89 29                   Subjective: The patient reports that she saw the orthopedic doctor yesterday and she had x-rays taken on her hip  Per patient the doctor told her he feels she has degenerative changes in her hip and some of her pain is coming from that  She cannot get a replacement though while she is on infusion treatments  She will be going back to see the doctor in July for her next appointment  Objective: See treatment diary below  Assessment: Added supine SLR and bridges with patient today  Fair tolerance to addition of TE with hip/groin pain noted with L SLR  Weakness is noted in her LLE with TE today  CP to L knee x 15 minutes to end session  Continued PT would be beneficial to improve function  Plan: Continue per plan of care  Progress as able in upcoming visits  Precautions:  Active lung cancer treatment, OA       Manuals     Iliopsoas, quadricept, hamstring, ABD, gastroc PROM    15 min  15 min not quadricepts 15 min                            Neuro Re-Ed        Nu-Step   10 min L3  10 min L3 L3 10 min    Monster walk  4x10 feet 8x10 feet 8 x 10 feet    Side Step and Squat   2x10 feet 6x10 feet  6 x 10 feet    Tandem Walk   4x10 feet  8x10 feet  8 x 10 feet    Standing SLR 3-way   10x bilat 10x bilat  10x each Ajith                    Ther Ex        Long arc quad   10x bilat 10x bilat  10x Ajith    Heel slides in supine         Bridge         Stair heel and toe raises        Front and Lat step and heel tap down up 4"  10x each bilat 10x each bilat front and lateral only  10x each  Ajith front and lateral only    Seated march  10x bilat  10x bilat  10x Ajith     Supine SLR    10x Ajith    Clamshells        S/L Hip Abd        Bridges    10x    HEP 15 min        Ther Activity                        Gait Training                        Modalities        Cold Pack to L knee in seated PRN  15 min  15 min     Hot Pack to L knee in seated  15 min  15 min

## 2021-05-24 ENCOUNTER — OFFICE VISIT (OUTPATIENT)
Dept: PHYSICAL THERAPY | Facility: CLINIC | Age: 81
End: 2021-05-24
Payer: COMMERCIAL

## 2021-05-24 DIAGNOSIS — M25.562 ACUTE PAIN OF LEFT KNEE: Primary | ICD-10-CM

## 2021-05-24 DIAGNOSIS — M17.12 PRIMARY OSTEOARTHRITIS OF LEFT KNEE: ICD-10-CM

## 2021-05-24 DIAGNOSIS — G89.29 CHRONIC PAIN OF LEFT KNEE: ICD-10-CM

## 2021-05-24 DIAGNOSIS — M25.562 CHRONIC PAIN OF LEFT KNEE: ICD-10-CM

## 2021-05-24 PROCEDURE — 97140 MANUAL THERAPY 1/> REGIONS: CPT

## 2021-05-24 PROCEDURE — 97112 NEUROMUSCULAR REEDUCATION: CPT

## 2021-05-24 PROCEDURE — 97110 THERAPEUTIC EXERCISES: CPT

## 2021-05-24 NOTE — PROGRESS NOTES
Daily Note     Today's date: 2021  Patient name: Mitchell Medel  : 1940  MRN: [id number]  Referring provider: Miladys Garduno MD  Dx:   Encounter Diagnosis     ICD-10-CM    1  Acute pain of left knee  M25 562    2  Primary osteoarthritis of left knee  M17 12    3  Chronic pain of left knee  M25 562     G89 29                   Subjective: "I feel like I have more energy "      Objective: See treatment diary below      Assessment: Tolerated treatment well  Patient demonstrated fatigue post treatment  Plan: Continue per plan of care  Precautions:  Active lung cancer treatment, OA       Manuals    Iliopsoas, quadricep, hamstring, ABD, gastroc PROM    15 min  15 min not quadricepts 15 min 15 min                           Neuro Re-Ed        Nu-Step   10 min L3  10 min L3 L3 10 min L3 10 min   Monster walk  4x10 feet 8x10 feet 8 x 10 feet 6x10 ft Red   Side Step and Squat   2x10 feet 6x10 feet  6 x 10 feet 6x10 ft Red   Tandem Walk   4x10 feet  8x10 feet  8 x 10 feet 8x 10 ft   Standing SLR 3-way   10x bilat 10x bilat  10x each Ajith 10x ea Red                   Ther Ex        Long arc quad   10x bilat 10x bilat  10x Ajith 10x Bilat   Heel slides in supine         Stair heel and toe raises        Front and Lat step and heel tap down up 4"  10x each bilat 10x each bilat front and lateral only  10x each  Ajith front and lateral only 10x ea    Seated march  10x bilat  10x bilat  10x Ajith  10x Bilat   Supine SLR    10x Ajith 15xx Bilat   Clamshells        S/L Hip Abd        Bridges    10x 15x   HEP 15 min        Ther Activity                        Gait Training                        Modalities        Cold Pack to L knee in seated PRN  15 min  15 min  15 min   Hot Pack to L knee in seated  15 min  15 min

## 2021-05-27 ENCOUNTER — OFFICE VISIT (OUTPATIENT)
Dept: PHYSICAL THERAPY | Facility: CLINIC | Age: 81
End: 2021-05-27
Payer: COMMERCIAL

## 2021-05-27 DIAGNOSIS — M25.562 ACUTE PAIN OF LEFT KNEE: Primary | ICD-10-CM

## 2021-05-27 DIAGNOSIS — M25.562 CHRONIC PAIN OF LEFT KNEE: ICD-10-CM

## 2021-05-27 DIAGNOSIS — M17.12 PRIMARY OSTEOARTHRITIS OF LEFT KNEE: ICD-10-CM

## 2021-05-27 DIAGNOSIS — G89.29 CHRONIC PAIN OF LEFT KNEE: ICD-10-CM

## 2021-05-27 PROCEDURE — 97110 THERAPEUTIC EXERCISES: CPT

## 2021-05-27 PROCEDURE — 97140 MANUAL THERAPY 1/> REGIONS: CPT

## 2021-05-27 PROCEDURE — 97112 NEUROMUSCULAR REEDUCATION: CPT

## 2021-05-27 NOTE — PROGRESS NOTES
Daily Note     Today's date: 2021  Patient name: Sylvia Locke  : 1940  MRN: [id number]  Referring provider: Yadira Weber MD  Dx:   Encounter Diagnosis     ICD-10-CM    1  Acute pain of left knee  M25 562    2  Primary osteoarthritis of left knee  M17 12    3  Chronic pain of left knee  M25 562     G89 29                   Subjective: Patient states she is fatigued today from outside work at her home  Objective: See treatment diary below      Assessment: Tolerated treatment well  Patient exhibited good technique with therapeutic exercises with verbal cuing  Butterfly stretch added to address ADD tightness  Plan: Continue per plan of care  Precautions:  Active lung cancer treatment, OA       Manuals    Iliopsoas, quadricep, hamstring, ABD, gastroc PROM   15 min 15 min  15 min not quadricepts 15 min 15 min                           Neuro Re-Ed        Nu-Step  10 min L3 10 min L3  10 min L3 L3 10 min L3 10 min   Monster walk 6x 10 ft Red 4x10 feet 8x10 feet 8 x 10 feet 6x10 ft Red   Side Step and Squat  6x 10 ft Red 2x10 feet 6x10 feet  6 x 10 feet 6x10 ft Red   Tandem Walk  8x 10 ft 4x10 feet  8x10 feet  8 x 10 feet 8x 10 ft   Standing SLR 3-way  10x ea Red 10x bilat 10x bilat  10x each Ajith 10x ea Red                   Ther Ex        Long arc quad  10x Bilat 10x bilat 10x bilat  10x Ajith 10x Bilat   Heel slides in supine         Stair heel and toe raises        Front and Lat step and heel tap down up 4" bilat 10x ea  10x each bilat 10x each bilat front and lateral only  10x each  Ajith front and lateral only 10x ea    Seated march 10x Bilat 10x bilat  10x bilat  10x Ajith  10x Bilat   Supine SLR 15x Bilat   10x Ajith 15x Bilat   Self butterfly stretch supine 15" hd 5x       S/L Hip Abd        Bridges 15x   10x 15x   HEP        Ther Activity                        Gait Training                        Modalities        Cold Pack to L knee in seated 15 min  15 min  15 min 15 min   Hot Pack to L knee in seated   15 min

## 2021-06-01 ENCOUNTER — OFFICE VISIT (OUTPATIENT)
Dept: PHYSICAL THERAPY | Facility: CLINIC | Age: 81
End: 2021-06-01
Payer: COMMERCIAL

## 2021-06-01 DIAGNOSIS — M17.12 PRIMARY OSTEOARTHRITIS OF LEFT KNEE: ICD-10-CM

## 2021-06-01 DIAGNOSIS — M25.562 ACUTE PAIN OF LEFT KNEE: Primary | ICD-10-CM

## 2021-06-01 DIAGNOSIS — M25.562 CHRONIC PAIN OF LEFT KNEE: ICD-10-CM

## 2021-06-01 DIAGNOSIS — G89.29 CHRONIC PAIN OF LEFT KNEE: ICD-10-CM

## 2021-06-01 PROCEDURE — 97110 THERAPEUTIC EXERCISES: CPT

## 2021-06-01 PROCEDURE — 97140 MANUAL THERAPY 1/> REGIONS: CPT

## 2021-06-01 PROCEDURE — 97112 NEUROMUSCULAR REEDUCATION: CPT

## 2021-06-01 NOTE — PROGRESS NOTES
Daily Note     Today's date: 2021  Patient name: Juan M Hawley  : 1940  MRN: [id number]  Referring provider: Navin Theodore MD  Dx:   Encounter Diagnosis     ICD-10-CM    1  Acute pain of left knee  M25 562    2  Primary osteoarthritis of left knee  M17 12    3  Chronic pain of left knee  M25 562     G89 29                   Subjective: Patient reports she mulched the end of last week resulting in increased soreness, however she was able to play miniature golf over the weekend  Patient notices improvement in her balance  Objective: See treatment diary below      Assessment: Tolerated treatment well  Patient demonstrated fatigue post treatment      Plan: Continue per plan of care  Precautions:  Active lung cancer treatment, OA       Manuals    Iliopsoas, quadricep, hamstring, ABD, gastroc PROM   15 min 15 min  15 min not quadricepts 15 min 15 min                           Neuro Re-Ed        Nu-Step  10 min L3 10 min L4  10 min L3 L3 10 min L3 10 min   Monster walk 6x 10 ft Red 6x 10 ft Red 8x10 feet 8 x 10 feet 6x10 ft Red   Side Step and Squat  6x 10 ft Red 6x 10 ft Red 6x10 feet  6 x 10 feet 6x10 ft Red   Tandem Walk  8x 10 ft 8x 10  ft 8x10 feet  8 x 10 feet 8x 10 ft   Standing SLR 3-way  10x ea Red 10x ea Red 10x bilat  10x each Ajith 10x ea Red                   Ther Ex        Long arc quad  10x Bilat 2x10 10x bilat  10x Ajith 10x Bilat   Heel slides in supine         Stair heel and toe raises        Front and Lat step and heel tap down up 4" bilat 10x ea  Fwd/ Retro 4"  10x ea Bilat  Lat 10x 4" 10x each bilat front and lateral only  10x each  Ajith front and lateral only 10x ea    Seated march 10x Bilat 2x10 10x bilat  10x Ajith  10x Bilat   Supine SLR 15x Bilat NT  10x Ajith 15x Bilat   Self butterfly stretch supine 15" hd 5x 5x      S/L Hip Abd        Bridges 15x 15x  10x 15x   HEP        Ther Activity                        Gait Training                        Modalities Cold Pack to L knee in seated 15 min 15 min 15 min  15 min  15 min   Hot Pack to L knee in seated

## 2021-06-03 ENCOUNTER — OFFICE VISIT (OUTPATIENT)
Dept: PHYSICAL THERAPY | Facility: CLINIC | Age: 81
End: 2021-06-03
Payer: COMMERCIAL

## 2021-06-03 DIAGNOSIS — M17.12 PRIMARY OSTEOARTHRITIS OF LEFT KNEE: Primary | ICD-10-CM

## 2021-06-03 DIAGNOSIS — M25.562 CHRONIC PAIN OF LEFT KNEE: ICD-10-CM

## 2021-06-03 DIAGNOSIS — G89.29 CHRONIC PAIN OF LEFT KNEE: ICD-10-CM

## 2021-06-03 PROCEDURE — 97110 THERAPEUTIC EXERCISES: CPT | Performed by: PHYSICAL THERAPIST

## 2021-06-03 PROCEDURE — 97112 NEUROMUSCULAR REEDUCATION: CPT | Performed by: PHYSICAL THERAPIST

## 2021-06-03 PROCEDURE — 97140 MANUAL THERAPY 1/> REGIONS: CPT | Performed by: PHYSICAL THERAPIST

## 2021-06-03 NOTE — PROGRESS NOTES
Daily Note     Today's date: 6/3/2021  Patient name: Felix Patel  : 1940  MRN: [id number]  Referring provider: Naren Glez MD  Dx: No diagnosis found  Subjective: Pt reports increased L hip and knee pain after doing yard work yesterday      Objective: See treatment diary below      Assessment: Tolerated treatment well  Patient would benefit from continued PT      Plan: Continue per plan of care  Precautions:  Active lung cancer treatment, OA       Manuals 5/27 6/1 6/3 5/20 5/24   Iliopsoas, quadricep, hamstring, ABD, gastroc PROM   15 min 15 min   15 min 15 min   MFR to L hip flexors, and ITB   13'     PF Mobs   2'             Neuro Re-Ed        Nu-Step  10 min L3 10 min L4  10 min L3 L3 10 min L3 10 min   Monster walk 6x 10 ft Red 6x 10 ft Red 6x10 ft 8 x 10 feet 6x10 ft Red   Side Step and Squat  6x 10 ft Red 6x 10 ft Red 6x10 ft 6 x 10 feet 6x10 ft Red   Tandem Walk  8x 10 ft 8x 10  ft 8x10 feet 8 x 10 feet 8x 10 ft   Standing SLR 3-way  10x ea Red 10x ea Red 10x ea Bilat 10x each Ajith 10x ea Red                   Ther Ex        Long arc quad  10x Bilat 2x10 2x10 bilat 10x Ajith 10x Bilat   Heel slides in supine         Stair heel and toe raises        Front and Lat step and heel tap down up 4" bilat 10x ea  Fwd/ Retro 4"  10x ea Bilat  Lat 10x 4" 10x each forward and Lat 4" 10x each  Ajith front and lateral only 10x ea    Seated march 10x Bilat 2x10 2x10 10x Ajith  10x Bilat   Supine SLR 15x Bilat NT 15x Bilat 10x Ajith 15x Bilat   Self butterfly stretch supine 15" hd 5x 5x      S/L Hip Abd        Bridges 15x 15x 15x 10x 15x   HEP        Ther Activity                        Gait Training                        Modalities        Cold Pack to L knee in seated 15 min 15 min  15 min  15 min   Hot Pack to L knee in seated

## 2021-06-07 ENCOUNTER — OFFICE VISIT (OUTPATIENT)
Dept: PHYSICAL THERAPY | Facility: CLINIC | Age: 81
End: 2021-06-07
Payer: COMMERCIAL

## 2021-06-07 DIAGNOSIS — M25.562 CHRONIC PAIN OF LEFT KNEE: ICD-10-CM

## 2021-06-07 DIAGNOSIS — G89.29 CHRONIC PAIN OF LEFT KNEE: ICD-10-CM

## 2021-06-07 DIAGNOSIS — M25.562 ACUTE PAIN OF LEFT KNEE: ICD-10-CM

## 2021-06-07 DIAGNOSIS — M17.12 PRIMARY OSTEOARTHRITIS OF LEFT KNEE: Primary | ICD-10-CM

## 2021-06-07 PROCEDURE — 97110 THERAPEUTIC EXERCISES: CPT

## 2021-06-07 PROCEDURE — 97112 NEUROMUSCULAR REEDUCATION: CPT

## 2021-06-07 PROCEDURE — 97140 MANUAL THERAPY 1/> REGIONS: CPT

## 2021-06-07 NOTE — PROGRESS NOTES
Daily Note     Today's date: 2021  Patient name: Lord Graham  : 1940  MRN: [id number]  Referring provider: Tremaine Copeland MD  Dx:   Encounter Diagnosis     ICD-10-CM    1  Primary osteoarthritis of left knee  M17 12    2  Chronic pain of left knee  M25 562     G89 29    3  Acute pain of left knee  M25 562                   Subjective: patient stated going up and down stairs are still difficult and cause pain  Pain in 5/10       Objective: See treatment diary below      Assessment: patient able to complete all tasks with good execution  No significant exacerbation of sx with program  Min/mod VC required for execution  Overall tolerated treatment well  Patient would benefit from continued therapy to decrease pain and increase strength and flexibility to improve LOF       Plan: Continue per plan of care  Progress treatment as tolerated  Precautions:  Active lung cancer treatment, OA       Manuals 5/27 6/1 6/3 6/7    Iliopsoas, quadricep, hamstring, ABD, gastroc PROM   15 min 15 min   15 min    MFR to L hip flexors, and ITB   13'     PF Mobs   2'             Neuro Re-Ed        Nu-Step  10 min L3 10 min L4  10 min L3 L5 10 min    Monster walk 6x 10 ft Red 6x 10 ft Red 6x10 ft 6 x 10 feet  Red     Side Step and Squat  6x 10 ft Red 6x 10 ft Red 6x10 ft 6 x 10 feet    Tandem Walk  8x 10 ft 8x 10  ft 8x10 feet 8 x 10 feet    Standing SLR 3-way  10x ea Red 10x ea Red 10x ea Bilat 10x each Ajith  Red                     Ther Ex        Long arc quad  10x Bilat 2x10 2x10 bilat 2x10 Ajith    Heel slides in supine         Stair heel and toe raises        Front and Lat step and heel tap down up 4" bilat 10x ea  Fwd/ Retro 4"  10x ea Bilat  Lat 10x 4" 10x each forward and Lat 4" 10x each  Ajith front and lateral only    Seated march 10x Bilat 2x10 2x10 2x10 Ajith     Supine SLR 15x Bilat NT 15x Bilat 10x Ajith    Self butterfly stretch supine 15" hd 5x 5x      S/L Hip Abd        Bridges 15x 15x 15x 10x    HEP        Ther Activity                        Gait Training                        Modalities        Cold Pack to L knee in seated 15 min 15 min  15 min     Hot Pack to L knee in seated

## 2021-06-10 ENCOUNTER — TRANSCRIBE ORDERS (OUTPATIENT)
Dept: PHYSICAL THERAPY | Facility: CLINIC | Age: 81
End: 2021-06-10

## 2021-06-10 ENCOUNTER — EVALUATION (OUTPATIENT)
Dept: PHYSICAL THERAPY | Facility: CLINIC | Age: 81
End: 2021-06-10
Payer: COMMERCIAL

## 2021-06-10 DIAGNOSIS — G89.29 CHRONIC PAIN OF LEFT KNEE: ICD-10-CM

## 2021-06-10 DIAGNOSIS — M17.12 PRIMARY OSTEOARTHRITIS OF LEFT KNEE: Primary | ICD-10-CM

## 2021-06-10 DIAGNOSIS — M25.562 CHRONIC PAIN OF LEFT KNEE: ICD-10-CM

## 2021-06-10 PROCEDURE — 97112 NEUROMUSCULAR REEDUCATION: CPT | Performed by: PHYSICAL THERAPIST

## 2021-06-10 PROCEDURE — 97140 MANUAL THERAPY 1/> REGIONS: CPT | Performed by: PHYSICAL THERAPIST

## 2021-06-10 NOTE — PROGRESS NOTES
PT Evaluation     Today's date: 2021  Patient name: Lester Mao  : 1940  MRN: [id number]  Referring provider: Jyothi Jurado MD  Dx:   Encounter Diagnosis     ICD-10-CM    1  Chronic pain of left knee  M25 562     G89 29    2  Primary osteoarthritis of left knee  M17 12                   Assessment  Assessment details: Patient was found to have gross strength deficits in the LE as well as decreased active and passive ROM in the L knee and hip and the R hip  Pain in the medial L knee may have limited motion  Pt demonstrates gains in strength and ROM since her evaluationCourse of skilled therapy may be prolonged due to comprehensive PMH  Patient likely to need 6 weeks of PT twice a weak to address aforementioned deficits through strengthening, flexibility, and modalities  PT recommends patient does follow up with orthopaedic MD  PT feel that patient would be a good candidate for joint supplement injections  Evaluation and exercises performed Maico Almonte DPHAYDER  Impairments: abnormal gait, abnormal or restricted ROM, activity intolerance, impaired balance, impaired physical strength, lacks appropriate home exercise program, pain with function and poor posture   Understanding of Dx/Px/POC: good   Prognosis: fair    Goals  ST  Patient will have increased ROM in the hip and knee by 20-30% - MET  2  Patient will have increased gross strength in the LE by 1-2 MMT grades - MET  3  Patient will have decreased pain 1-2 on VAS - MET  4  Initiate HEP - MET    LT  Patient will have less difficulty with stair climbing - Progressing  2  Patient will have less difficulty with ascending and descending curbs - Progressing  3  Patient will have less difficulty with rising from chairs - Progressing  4  Discharge with HEP    Plan  Plan details: POC discussed with patient who was motivated to comply     Patient would benefit from: skilled physical therapy and PT eval  Planned modality interventions: cryotherapy and thermotherapy: hydrocollator packs  Planned therapy interventions: patient education, postural training, strengthening, stretching, therapeutic activities, therapeutic exercise, transfer training, home exercise program, gait training, functional ROM exercises, flexibility, coordination, balance and manual therapy  Frequency: 2x week  Duration in weeks: 6  Treatment plan discussed with: patient        Subjective Evaluation    History of Present Illness  Mechanism of injury: Patient reports gradual onset knee pain starting about 2 months ago when climbing stairs and getting in and out of chairs  She thought her knee pain was related to infusion treatments she has been undergoing for lung cancer treatment  PMH significant for lung cancer, OA, and HTN  (controlled)  Patient is not working  Pain  Current pain ratin  At best pain ratin  At worst pain rating: 10  Location: L medial knee, joint line, patella, and medial tibial plateau and medial femoral condyle  Quality: burning  Relieving factors: rest  Aggravating factors: stair climbing      Diagnostic Tests  X-ray: abnormal  Treatments  Current treatment: medication and physical therapy  Patient Goals  Patient goals for therapy: decreased pain, increased strength, improved balance, independence with ADLs/IADLs and return to sport/leisure activities          Objective     Palpation   Left   Muscle spasm in the iliopsoas  Tenderness of the iliopsoas and rectus femoris  Additional Palpation Details  Tenderness in the rectus femoris just superior to the patella     Tenderness     Left Hip   Tenderness in the greater trochanter  No tenderness in the PSIS and iliac crest    Left Knee   Tenderness in the inferior patella, medial joint line, medial patella, medial retinaculum, patellar tendon, pes anserinus, quadriceps tendon and superior patella  No tenderness in the MCL (distal) and MCL (proximal)       Neurological Testing     Reflexes   Left   Patellar (L4): normal (2+)  Achilles (S1): normal (2+)    Right   Patellar (L4): normal (2+)  Achilles (S1): normal (2+)    Active Range of Motion   Left Hip   Flexion: 45 degrees   External rotation (90/90): 0 degrees   Internal rotation (90/90): 0 degrees     Right Hip   Flexion: 91 degrees   Left Knee   Flexion: 115 degrees   Extension: -5 degrees     Right Knee   Flexion: [facility]    Mobility   Patellar Mobility:   Left Knee   WFL: medial, lateral, superior and inferior  Additional Mobility Details  Superior/Inferior motion produced pain     Strength/Myotome Testing     Left Hip   Planes of Motion   Flexion: 2+  Extension: 4  Abduction: 4-  Adduction: 3  External rotation: 3  Internal rotation: 4    Right Hip   Planes of Motion   Flexion: 4  Extension: 5  Abduction: 4-  Adduction: 3  External rotation: 3  Internal rotation: 4    Left Knee   Flexion: 4-  Extension: 4    Right Knee   Flexion: 4  Extension: 4    Tests     Left Knee   Positive valgus stress test at 30 degrees  Negative varus stress test at 30 degrees  Additional Tests Details  Grade 1 Valgus Stress Test     Ambulation     Ambulation: Level Surfaces   Ambulation without assistive device: independent    Additional Level Surfaces Ambulation Details  Patient ambulates independently on level surfaces with decreased eric, decreased step length, decreased knee/hip flexion, decreased foot clearance, decreased push off, flat foot contact and increased stance time on R LE  Precautions:  Active lung cancer treatment, OA         Manuals 5/27 6/1 6/3 6/7  6/10   Iliopsoas, quadricep, hamstring, ABD, gastroc PROM   15 min 15 min    15 min     MFR to L hip flexors, and ITB     13'    13'   PF Mobs     2'    2'                 Neuro Re-Ed             Nu-Step  10 min L3 10 min L4  10 min L3 L5 10 min  L5 10'   Monster walk 6x 10 ft Red 6x 10 ft Red 6x10 ft 6 x 10 feet  Red   6X10 feet   Side Step and Squat  6x 10 ft Red 6x 10 ft Red 6x10 ft 6 x 10 feet  6X10 feet   Tandem Walk  8x 10 ft 8x 10  ft 8x10 feet 8 x 10 feet  8x10 feet   Standing SLR 3-way  10x ea Red 10x ea Red 10x ea Bilat 10x each Ajith  Red   10x each Bilat                               Ther Ex             Long arc quad  10x Bilat 2x10 2x10 bilat 2x10 Ajith     Heel slides in supine              Stair heel and toe raises             Front and Lat step and heel tap down up 4" bilat 10x ea  Fwd/ Retro 4"  10x ea Bilat  Lat 10x 4" 10x each forward and Lat 4" 10x each  Ajith front and lateral only     Seated march 10x Bilat 2x10 2x10 2x10 Ajith      Supine SLR 15x Bilat NT 15x Bilat 10x Ajith     Self butterfly stretch supine 15" hd 5x 5x         S/L Hip Abd             Bridges 15x 15x 15x 10x     HEP             Ther Activity                                         Gait Training                                         Modalities             Cold Pack to L knee in seated 15 min 15 min   15 min      Hot Pack to L knee in seated

## 2021-06-16 ENCOUNTER — OFFICE VISIT (OUTPATIENT)
Dept: PHYSICAL THERAPY | Facility: CLINIC | Age: 81
End: 2021-06-16
Payer: COMMERCIAL

## 2021-06-16 DIAGNOSIS — G89.29 CHRONIC PAIN OF LEFT KNEE: ICD-10-CM

## 2021-06-16 DIAGNOSIS — M17.12 PRIMARY OSTEOARTHRITIS OF LEFT KNEE: Primary | ICD-10-CM

## 2021-06-16 DIAGNOSIS — M25.562 ACUTE PAIN OF LEFT KNEE: ICD-10-CM

## 2021-06-16 DIAGNOSIS — M25.562 CHRONIC PAIN OF LEFT KNEE: ICD-10-CM

## 2021-06-16 PROCEDURE — 97112 NEUROMUSCULAR REEDUCATION: CPT

## 2021-06-16 PROCEDURE — 97110 THERAPEUTIC EXERCISES: CPT

## 2021-06-16 PROCEDURE — 97140 MANUAL THERAPY 1/> REGIONS: CPT

## 2021-06-16 NOTE — PROGRESS NOTES
Daily Note     Today's date: 2021  Patient name: Sylvia Locke  : 1940  MRN: [id number]  Referring provider: Yadira Weber MD  Dx:   Encounter Diagnosis     ICD-10-CM    1  Primary osteoarthritis of left knee  M17 12    2  Chronic pain of left knee  M25 562     G89 29    3  Acute pain of left knee  M25 562                   Subjective: Patient reports improvement in balance, but continues with left knee pain & weakness lifting her leg also c/o groin pain  She also reports improved strength  Objective: See treatment diary below      Assessment: Tolerated treatment well  Patient would benefit from continued PT      Plan: Continue per plan of care  Precautions:  Active lung cancer treatment, OA       Manuals 5/27 6/1 6/3 6/7 6/16   Iliopsoas, quadricep, hamstring, ABD, gastroc PROM   15 min 15 min   15 min    MFR to L hip flexors, and ITB   13'  13'   PF Mobs   2'  2'           Neuro Re-Ed        Nu-Step  10 min L3 10 min L4  10 min L3 L5 10 min L4 10 min   Monster walk 6x 10 ft Red 6x 10 ft Red 6x10 ft 6 x 10 feet  Red  6x 10 ft Red   Side Step and Squat  6x 10 ft Red 6x 10 ft Red 6x10 ft 6 x 10 feet 6x 10 ft Red   Tandem Walk  8x 10 ft 8x 10  ft 8x10 feet 8 x 10 feet 8x 10 ft   Standing SLR 3-way Bilat 10x ea Red 10x ea Red 10x ea Bilat 10x each Ajith  Red  10x ea Red                   Ther Ex        Long arc quad - bilat 10x Bilat 2x10 2x10 bilat 2x10 Ajith 2x10   Heel slides in supine         Stair heel and toe raises        Front and Lat step and heel tap down up 4" bilat 10x ea  Fwd/ Retro 4"  10x ea Bilat  Lat 10x 4" 10x each forward and Lat 4" 10x each  Ajith front and lateral only 10x ea FSU LSU only 4"  (Held Tap due to pain)   Seated march 10x Bilat 2x10 2x10 2x10 Ajith  2x10   Supine SLR bilat 15x Bilat NT 15x Bilat 10x Ajith 10x   Self butterfly stretch supine 15" hd 5x 5x      S/L Hip Abd        Bridges 15x 15x 15x 10x 2x10   HEP        Ther Activity                        Gait Training Modalities        Cold Pack to L knee in seated 15 min 15 min  15 min  None   Hot Pack to L knee in seated

## 2021-06-18 ENCOUNTER — OFFICE VISIT (OUTPATIENT)
Dept: PHYSICAL THERAPY | Facility: CLINIC | Age: 81
End: 2021-06-18
Payer: COMMERCIAL

## 2021-06-18 DIAGNOSIS — M25.562 CHRONIC PAIN OF LEFT KNEE: ICD-10-CM

## 2021-06-18 DIAGNOSIS — G89.29 CHRONIC PAIN OF LEFT KNEE: ICD-10-CM

## 2021-06-18 DIAGNOSIS — M25.562 ACUTE PAIN OF LEFT KNEE: ICD-10-CM

## 2021-06-18 DIAGNOSIS — M17.12 PRIMARY OSTEOARTHRITIS OF LEFT KNEE: Primary | ICD-10-CM

## 2021-06-18 PROCEDURE — 97140 MANUAL THERAPY 1/> REGIONS: CPT

## 2021-06-18 PROCEDURE — 97112 NEUROMUSCULAR REEDUCATION: CPT

## 2021-06-18 PROCEDURE — 97110 THERAPEUTIC EXERCISES: CPT

## 2021-06-18 NOTE — PROGRESS NOTES
Daily Note     Today's date: 2021  Patient name: Lester Mao  : 1940  MRN: [id number]  Referring provider: Jyothi Jurado MD  Dx:   Encounter Diagnosis     ICD-10-CM    1  Primary osteoarthritis of left knee  M17 12    2  Chronic pain of left knee  M25 562     G89 29    3  Acute pain of left knee  M25 562        Start Time: 0700  Stop Time: 8967  Total time in clinic (min): 55 minutes    Subjective: Patient states, "It's stiff this morning but it's getting better"  Objective: See treatment diary below    Assessment: Patient able to complete all TE listed below with no increase in pain  She is progressing well towards her goals with skilled therapy  Seated breaks required secondary to fatigue  Plan: Continue per plan of care  Precautions:  Active lung cancer treatment, OA     Manuals 6/18 6/1 6/3 6/7 6/16   Iliopsoas, quadricep, hamstring, ABD, gastroc PROM    15 min   15 min    MFR to L hip flexors, and ITB 15'  13'  13'   PF Mobs   2'  2'           Neuro Re-Ed        Nu-Step  L4 10 min 10 min L4  10 min L3 L5 10 min L4 10 min   Monster walk 6x 10 ft Red 6x 10 ft Red 6x10 ft 6 x 10 feet  Red  6x 10 ft Red   Side Step and Squat  6x 10 ft Red 6x 10 ft Red 6x10 ft 6 x 10 feet 6x 10 ft Red   Tandem Walk  8x 10 ft 8x 10  ft 8x10 feet 8 x 10 feet 8x 10 ft   Standing SLR 3-way Bilat 10x ea Red 10x ea Red 10x ea Bilat 10x each Ajith  Red  10x ea Red                   Ther Ex        Long arc quad - bilat 3x10 2x10 2x10 bilat 2x10 Ajith 2x10   Heel slides in supine         Stair heel and toe raises        Front and Lat step and heel tap down up 4" bilat 10x ea  Fwd/Lat Fwd/ Retro 4"  10x ea Bilat  Lat 10x 4" 10x each forward and Lat 4" 10x each  Ajith front and lateral only 10x ea FSU LSU only 4"  (Held Tap due to pain)   Seated march 2x10 2x10 2x10 2x10 Ajith  2x10   Supine SLR bilat 10x NT 15x Bilat 10x Ajith 10x   Self butterfly stretch supine 15" hd  5x      S/L Hip Abd        Bridges 2x10 15x 15x 10x 2x10   HEP        Ther Activity                        Gait Training                        Modalities        Cold Pack to L knee in seated  15 min  15 min  None   Hot Pack to L knee in seated

## 2021-06-21 ENCOUNTER — OFFICE VISIT (OUTPATIENT)
Dept: PHYSICAL THERAPY | Facility: CLINIC | Age: 81
End: 2021-06-21
Payer: COMMERCIAL

## 2021-06-21 DIAGNOSIS — G89.29 CHRONIC PAIN OF LEFT KNEE: ICD-10-CM

## 2021-06-21 DIAGNOSIS — M25.562 CHRONIC PAIN OF LEFT KNEE: ICD-10-CM

## 2021-06-21 DIAGNOSIS — M17.12 PRIMARY OSTEOARTHRITIS OF LEFT KNEE: Primary | ICD-10-CM

## 2021-06-21 DIAGNOSIS — M25.562 ACUTE PAIN OF LEFT KNEE: ICD-10-CM

## 2021-06-21 PROCEDURE — 97140 MANUAL THERAPY 1/> REGIONS: CPT

## 2021-06-21 PROCEDURE — 97112 NEUROMUSCULAR REEDUCATION: CPT

## 2021-06-21 PROCEDURE — 97110 THERAPEUTIC EXERCISES: CPT

## 2021-06-21 NOTE — PROGRESS NOTES
Daily Note     Today's date: 2021  Patient name: Demetrio Mishra  : 1940  MRN: [id number]  Referring provider: Brent Healy MD  Dx:   Encounter Diagnosis     ICD-10-CM    1  Primary osteoarthritis of left knee  M17 12    2  Chronic pain of left knee  M25 562     G89 29    3  Acute pain of left knee  M25 562        Start Time: 1303          Subjective: Pt reports that she "always has pain"  Pt notes pain with walking  Objective: See treatment diary below      Assessment: Tolerated treatment well  Patient with tightness in L LE noted with manual therapy  Pt does well with there ex, but does need verbal cues for proper ex technique  Pt is challenged with forward step downs due to weakness  Pt would benefit from continued therapy for strengthening for functional mobility  Plan: Continue per plan of care  Precautions: Active lung cancer treatment, OA     Manuals 6/18 6/21 6/3 6/7 6/16   Iliopsoas, quadricep, hamstring, ABD, gastroc PROM    13 min    15 min    MFR to L hip flexors, and ITB   13'  13'   PF Mobs   2'  2'           Neuro Re-Ed        Nu-Step  L4 10 min 10 min L4  10 min L3 L5 10 min L4 10 min   Monster walk 6x 10 ft Red 6x 10 ft Red 6x10 ft 6 x 10 feet  Red  6x 10 ft Red   Side Step and Squat  6x 10 ft Red 6x 10 ft Red 6x10 ft 6 x 10 feet 6x 10 ft Red   Tandem Walk  8x 10 ft 8x 10  ft 8x10 feet 8 x 10 feet 8x 10 ft   Standing SLR 3-way Bilat 10x ea Red 10x ea Red 10x ea Bilat 10x each Ajith  Red  10x ea Red                   Ther Ex        Long arc quad - bilat 3x10 3x10 2x10 bilat 2x10 Ajith 2x10   Heel slides in supine         Stair heel and toe raises        Front and Lat step and heel tap down up 4" bilat 10x ea  Fwd/Lat Fwd 4"  10x ea Bilat  Lat 10x 4" 10x each forward and Lat 4" 10x each  Ajith front and lateral only 10x ea FSU LSU only 4"  (Held Tap due to pain)   Seated march 2x10 2x10 2x10 2x10 Ajith  2x10   Supine SLR bilat 10x 15 x B 15x Bilat 10x Ajith 10x   Self butterfly stretch supine 15" hd        S/L Hip Abd        Bridges 2x10 2 x 10 15x 10x 2x10   HEP        Ther Activity                        Gait Training                        Modalities        Cold Pack to L knee in seated  15 min  15 min  None   Hot Pack to L knee in seated

## 2021-06-24 ENCOUNTER — OFFICE VISIT (OUTPATIENT)
Dept: PHYSICAL THERAPY | Facility: CLINIC | Age: 81
End: 2021-06-24
Payer: COMMERCIAL

## 2021-06-24 DIAGNOSIS — M25.562 CHRONIC PAIN OF LEFT KNEE: ICD-10-CM

## 2021-06-24 DIAGNOSIS — M25.562 ACUTE PAIN OF LEFT KNEE: ICD-10-CM

## 2021-06-24 DIAGNOSIS — M17.12 PRIMARY OSTEOARTHRITIS OF LEFT KNEE: Primary | ICD-10-CM

## 2021-06-24 DIAGNOSIS — G89.29 CHRONIC PAIN OF LEFT KNEE: ICD-10-CM

## 2021-06-24 PROCEDURE — 97140 MANUAL THERAPY 1/> REGIONS: CPT

## 2021-06-24 PROCEDURE — 97110 THERAPEUTIC EXERCISES: CPT

## 2021-06-24 PROCEDURE — 97112 NEUROMUSCULAR REEDUCATION: CPT

## 2021-06-24 NOTE — PROGRESS NOTES
Daily Note     Today's date: 2021  Patient name: Mitchell Medel  : 1940  MRN: [id number]  Referring provider: Miladys Garduno MD  Dx:   Encounter Diagnosis     ICD-10-CM    1  Primary osteoarthritis of left knee  M17 12    2  Chronic pain of left knee  M25 562     G89 29    3  Acute pain of left knee  M25 562        Start Time: 1307          Subjective: Pt reports that she has been sore since last PT visit noting her knee "gave out" a couple of times  Objective: See treatment diary below      Assessment: Tolerated treatment well  Patient education on using ice for pain control  Pt with tightness in L LE and needs verbal cues to relax with manual therapy as pt is very guarded with all manual techniques  Pt needs cues for proper ex techniques  Pt with modified reps due to weakness and soreness after last PT visit  Pt would benefit from continued therapy for strengthening for improving functional mobility  Plan: Continue per plan of care  Precautions: Active lung cancer treatment, OA     Manuals    Iliopsoas, quadricep, hamstring, ABD, gastroc PROM    13 min   7' 15 min    MFR to L hip flexors, and ITB   8'  13'   PF Mobs     2'           Neuro Re-Ed        Nu-Step  L4 10 min 10 min L4  10 min L3 L5 10 min L4 10 min   Monster walk 6x 10 ft Red 6x 10 ft Red  6 x 10 feet  Red  6x 10 ft Red   Side Step and Squat  6x 10 ft Red 6x 10 ft Red  6 x 10 feet 6x 10 ft Red   Tandem Walk  8x 10 ft 8x 10  ft  8 x 10 feet 8x 10 ft   Standing SLR 3-way Bilat 10x ea Red 10x ea Red 10x ea Bilat 10x each Ajith  Red  10x ea Red                   Ther Ex        Long arc quad - bilat 3x10 3x10 15 bilat 2x10 Ajith 2x10   Heel slides in supine         Stair heel and toe raises        Front and Lat step and heel tap down up 4" bilat 10x ea  Fwd/Lat Fwd 4"  10x ea Bilat  Lat 10x 4" 10x each forward and Lat 4" 10x each  Ajith front and lateral only 10x ea FSU LSU only 4"  (Held Tap due to pain) Seated march 2x10 2x10 15 2x10 Ajith  2x10   Supine SLR bilat 10x 15 x B 15x Bilat 10x Ajith 10x   Self butterfly stretch supine 15" hd        S/L Hip Abd        Bridges 2x10 2 x 10 15x 10x 2x10   HEP        Ther Activity                        Gait Training                        Modalities        Cold Pack to L knee in seated  15 min  15 min  None   Hot Pack to L knee in seated

## 2021-06-28 ENCOUNTER — OFFICE VISIT (OUTPATIENT)
Dept: PHYSICAL THERAPY | Facility: CLINIC | Age: 81
End: 2021-06-28
Payer: COMMERCIAL

## 2021-06-28 DIAGNOSIS — M25.562 ACUTE PAIN OF LEFT KNEE: ICD-10-CM

## 2021-06-28 DIAGNOSIS — G89.29 CHRONIC PAIN OF LEFT KNEE: ICD-10-CM

## 2021-06-28 DIAGNOSIS — M25.562 CHRONIC PAIN OF LEFT KNEE: ICD-10-CM

## 2021-06-28 DIAGNOSIS — M17.12 PRIMARY OSTEOARTHRITIS OF LEFT KNEE: Primary | ICD-10-CM

## 2021-06-28 PROCEDURE — 97112 NEUROMUSCULAR REEDUCATION: CPT

## 2021-06-28 PROCEDURE — 97110 THERAPEUTIC EXERCISES: CPT

## 2021-06-28 NOTE — PROGRESS NOTES
Daily Note     Today's date: 2021  Patient name: Claudia Botello  : 1940  MRN: [id number]  Referring provider: Kavita Cali MD  Dx:   Encounter Diagnosis     ICD-10-CM    1  Primary osteoarthritis of left knee  M17 12    2  Chronic pain of left knee  M25 562     G89 29    3  Acute pain of left knee  M25 562        Start Time: 1300          Subjective: Pt reports that she is more sore in quads and side of knee from last visit noting increased pain  Objective: See treatment diary below      Assessment: Tolerated treatment well  Patient with increased c/o pain  Will modify pt treatment due to increased c/o pain  Pt felt looser after treatment and had no increased pain  Pt with weakness in B LE  Pt would benefit from continued therapy for strengthening for improving functional mobility  Plan: Continue per plan of care  Precautions: Active lung cancer treatment, OA     Manuals     Iliopsoas, quadricep, hamstring, ABD, gastroc PROM    13 min  7' NT due to pain    MFR to L hip flexors, and ITB   8' NT due to pain    PF Mobs                Neuro Re-Ed        Nu-Step  L4 10 min 10 min L4  10 min L3 L 3 10 min      Monster walk 6x 10 ft Red 6x 10 ft Red  6 x 10'    Side Step and Squat  6x 10 ft Red 6x 10 ft Red  6 x 10'    Tandem Walk  8x 10 ft 8x 10  ft  6 x 10'    Standing SLR 3-way Bilat 10x ea Red 10x ea Red 10x ea Bilat 15 x B                    Ther Ex        Long arc quad - bilat 3x10 3x10 15 bilat 2 x 10    Heel slides in supine         Stair heel and toe raises        Front and Lat step and heel tap down up 4" bilat 10x ea  Fwd/Lat Fwd 4"  10x ea Bilat  Lat 10x 4" 10x each forward and Lat 4"     Seated march 2x10 2x10 15 2 x 10    Supine SLR bilat 10x 15 x B 15x Bilat 15 x B    Self butterfly stretch supine 15" hd        S/L Hip Abd        Bridges 2x10 2 x 10 15x 20 x     HEP        Self hamstring stretching      3 x 30"    quad stretching with strap    1' Gait Training                        Modalities        Cold Pack to L knee in seated  15 min      Hot Pack to L knee in seated

## 2021-07-01 ENCOUNTER — APPOINTMENT (OUTPATIENT)
Dept: PHYSICAL THERAPY | Facility: CLINIC | Age: 81
End: 2021-07-01
Payer: COMMERCIAL

## 2021-07-06 ENCOUNTER — OFFICE VISIT (OUTPATIENT)
Dept: PHYSICAL THERAPY | Facility: CLINIC | Age: 81
End: 2021-07-06
Payer: COMMERCIAL

## 2021-07-06 DIAGNOSIS — M25.562 CHRONIC PAIN OF LEFT KNEE: ICD-10-CM

## 2021-07-06 DIAGNOSIS — M17.12 PRIMARY OSTEOARTHRITIS OF LEFT KNEE: Primary | ICD-10-CM

## 2021-07-06 DIAGNOSIS — G89.29 CHRONIC PAIN OF LEFT KNEE: ICD-10-CM

## 2021-07-06 PROCEDURE — 97112 NEUROMUSCULAR REEDUCATION: CPT | Performed by: PHYSICAL THERAPIST

## 2021-07-06 PROCEDURE — 97140 MANUAL THERAPY 1/> REGIONS: CPT | Performed by: PHYSICAL THERAPIST

## 2021-07-06 NOTE — PROGRESS NOTES
Daily Note     Today's date: 2021  Patient name: Ovi St  : 1940  MRN: [id number]  Referring provider: Radha Brown MD  Dx:   Encounter Diagnosis     ICD-10-CM    1  Primary osteoarthritis of left knee  M17 12    2  Chronic pain of left knee  M25 562     G89 29                   Subjective: Pt reports difficulty ascending/descending stairs  Pt reports she feels more steady when walking her dogs        Objective: See treatment diary below      Assessment: Tolerated treatment well  Patient would benefit from continued PT      Plan: Progress treatment as tolerated  Precautions: Active lung cancer treatment, OA     Manuals  7   Iliopsoas, quadricep, hamstring, ABD, gastroc PROM    13 min  7' NT due to pain    MFR to L hip flexors, and ITB   8' NT due to pain 15'   PF Mobs                Neuro Re-Ed        Nu-Step  L4 10 min 10 min L4  10 min L3 L 3 10 min   L4 10min   Monster walk 6x 10 ft Red 6x 10 ft Red  6 x 10' 6x10' red   Side Step and Squat  6x 10 ft Red 6x 10 ft Red  6 x 10' 6x10' red   Tandem Walk  8x 10 ft 8x 10  ft  6 x 10' 6x10'   Standing SLR 3-way Bilat 10x ea Red 10x ea Red 10x ea Bilat 15 x B 15x each Bilat                   Ther Ex        Long arc quad - bilat 3x10 3x10 15 bilat 2 x 10 30x   Heel slides in supine         Stair heel and toe raises        Front and Lat step and heel tap down up 4" bilat 10x ea  Fwd/Lat Fwd 4"  10x ea Bilat  Lat 10x 4" 10x each forward and Lat 4"  10x each forward and Lat 4 inch   Seated march 2x10 2x10 15 2 x 10 30x    Supine SLR bilat 10x 15 x B 15x Bilat 15 x B    Self butterfly stretch supine 15" hd        S/L Hip Abd        Bridges 2x10 2 x 10 15x 20 x  20x    HEP        Self hamstring stretching      3 x 30" 3x30"   quad stretching with strap    1'            Gait Training                        Modalities        Cold Pack to L knee in seated  15 min      Hot Pack to L knee in seated

## 2021-07-08 ENCOUNTER — OFFICE VISIT (OUTPATIENT)
Dept: PHYSICAL THERAPY | Facility: CLINIC | Age: 81
End: 2021-07-08
Payer: COMMERCIAL

## 2021-07-08 DIAGNOSIS — M25.562 CHRONIC PAIN OF LEFT KNEE: ICD-10-CM

## 2021-07-08 DIAGNOSIS — G89.29 CHRONIC PAIN OF LEFT KNEE: ICD-10-CM

## 2021-07-08 DIAGNOSIS — M17.12 PRIMARY OSTEOARTHRITIS OF LEFT KNEE: Primary | ICD-10-CM

## 2021-07-08 PROCEDURE — 97112 NEUROMUSCULAR REEDUCATION: CPT | Performed by: PHYSICAL THERAPIST

## 2021-07-08 PROCEDURE — 97140 MANUAL THERAPY 1/> REGIONS: CPT | Performed by: PHYSICAL THERAPIST

## 2021-07-08 PROCEDURE — 97110 THERAPEUTIC EXERCISES: CPT | Performed by: PHYSICAL THERAPIST

## 2021-07-08 NOTE — PROGRESS NOTES
Daily Note     Today's date: 2021  Patient name: Yuki Yoder  : 1940  MRN: [id number]  Referring provider: Jones Titus MD  Dx:   Encounter Diagnosis     ICD-10-CM    1  Primary osteoarthritis of left knee  M17 12    2  Chronic pain of left knee  M25 562     G89 29                   Subjective: Pt reports feeling better today      Objective: See treatment diary below      Assessment: Tolerated treatment well  Patient would benefit from continued PT      Plan: Progress treatment as tolerated  Precautions: Active lung cancer treatment, OA     Manuals    Iliopsoas, quadricep, hamstring, ABD, gastroc PROM     7' NT due to pain    MFR to L hip flexors, and ITB 10'  8' NT due to pain 15'   PF Mobs                Neuro Re-Ed        Nu-Step  10 min L4  10 min L3 L 3 10 min   L4 10min   Monster walk 6x10'   6 x 10' 6x10' red   Side Step and Squat  6x10'   6 x 10' 6x10' red   Tandem Walk  6x10'   6 x 10' 6x10'   Standing SLR 3-way Bilat 15x ea Bilat  10x ea Bilat 15 x B 15x each Bilat                   Ther Ex        Long arc quad - bilat 30x  15 bilat 2 x 10 30x   Heel slides in supine         Stair heel and toe raises        Front and Lat step and heel tap down up 4" bilat 10x each forward and Lat 15x 10x each forward and Lat 4"  10x each forward and Lat 4 inch   Seated march 30x  15 2 x 10 30x    Supine SLR bilat   15x Bilat 15 x B    Self butterfly stretch supine 15" hd        S/L Hip Abd        Bridges 30x  15x 20 x  20x    HEP        Self hamstring stretching 30" hold Seated 3x ea     3 x 30" 3x30"   quad stretching with strap    1'            Gait Training                        Modalities        Cold Pack to L knee in seated        Hot Pack to L knee in seated

## 2021-07-12 ENCOUNTER — APPOINTMENT (OUTPATIENT)
Dept: PHYSICAL THERAPY | Facility: CLINIC | Age: 81
End: 2021-07-12
Payer: COMMERCIAL

## 2021-07-15 ENCOUNTER — OFFICE VISIT (OUTPATIENT)
Dept: PHYSICAL THERAPY | Facility: CLINIC | Age: 81
End: 2021-07-15
Payer: COMMERCIAL

## 2021-07-15 DIAGNOSIS — M25.562 CHRONIC PAIN OF LEFT KNEE: ICD-10-CM

## 2021-07-15 DIAGNOSIS — G89.29 CHRONIC PAIN OF LEFT KNEE: ICD-10-CM

## 2021-07-15 DIAGNOSIS — M17.12 PRIMARY OSTEOARTHRITIS OF LEFT KNEE: Primary | ICD-10-CM

## 2021-07-15 DIAGNOSIS — M25.562 ACUTE PAIN OF LEFT KNEE: ICD-10-CM

## 2021-07-15 PROCEDURE — 97112 NEUROMUSCULAR REEDUCATION: CPT

## 2021-07-15 PROCEDURE — 97140 MANUAL THERAPY 1/> REGIONS: CPT

## 2021-07-15 PROCEDURE — 97110 THERAPEUTIC EXERCISES: CPT

## 2021-07-15 NOTE — PROGRESS NOTES
Daily Note     Today's date: 7/15/2021  Patient name: Sujata Estevez  : 1940  MRN: [id number]  Referring provider: Leslee Sanders MD  Dx:   Encounter Diagnosis     ICD-10-CM    1  Primary osteoarthritis of left knee  M17 12    2  Chronic pain of left knee  M25 562     G89 29    3  Acute pain of left knee  M25 562                   Subjective: Patient reports that she decided to weed whack & weed her sweet potato patch on Friday and was unable to rise from a seated position on the ground, causing her in "booger up" her left knee (bandage)  She also reports increased back and left hip pain since  Patient's daughter states the patient was unable to rise from a seated position on the floor over the weekend and had to call the neighbor for assistance  Objective: See treatment diary below      Assessment: Tolerated treatment fair  Patient was antalgic with transitional movements  Program modified due to patient subjective  Will continue to monitor pain levels and return to full program as pain allows  Plan: Continue per plan of care  Precautions: Active lung cancer treatment, OA     Manuals 7/8 7/15 6/24 6/28 7   Iliopsoas, quadricep, hamstring, ABD, gastroc PROM     7' NT due to pain    MFR to L hip flexors, and ITB 10'  8' NT due to pain 15'   PF Mobs                Neuro Re-Ed        Nu-Step  10 min L4 10 min L4 10 min L3 L 3 10 min   L4 10min   Monster walk 6x10' Held  6 x 10' 6x10' red   Side Step and Squat  6x10' 4x 10 ft No TB  6 x 10' 6x10' red   Tandem Walk  6x10' NT  6 x 10' 6x10'   Standing SLR 3-way Bilat 15x ea Bilat 10x ea Bilat 10x ea Bilat 15 x B 15x each Bilat                   Ther Ex        Long arc quad - bilat 30x 20x 15 bilat 2 x 10 30x   Heel slides in supine         Stair heel and toe raises        Front and Lat step and heel tap down up 4" bilat 10x each forward and Lat Held 10x each forward and Lat 4"  10x each forward and Lat 4 inch   Seated march 30x 10x 15 2 x 10 30x Supine SLR bilat  10x B 15x Bilat 15 x B    Self butterfly stretch supine 15" hd        S/L Hip Abd        Bridges 30x 20x 15x 20 x  20x    HEP        Self hamstring stretching 30" hold Seated 3x ea 3x    3 x 30" 3x30"   quad stretching with strap    1' 1' therapist           Gait Training                        Modalities        Cold Pack to L knee in seated        Hot Pack to L knee in seated

## 2021-07-22 ENCOUNTER — OFFICE VISIT (OUTPATIENT)
Dept: PHYSICAL THERAPY | Facility: CLINIC | Age: 81
End: 2021-07-22
Payer: COMMERCIAL

## 2021-07-22 DIAGNOSIS — M17.12 PRIMARY OSTEOARTHRITIS OF LEFT KNEE: Primary | ICD-10-CM

## 2021-07-22 DIAGNOSIS — G89.29 CHRONIC PAIN OF LEFT KNEE: ICD-10-CM

## 2021-07-22 DIAGNOSIS — M25.562 ACUTE PAIN OF LEFT KNEE: ICD-10-CM

## 2021-07-22 DIAGNOSIS — M25.562 CHRONIC PAIN OF LEFT KNEE: ICD-10-CM

## 2021-07-22 PROCEDURE — 97110 THERAPEUTIC EXERCISES: CPT

## 2021-07-22 PROCEDURE — 97140 MANUAL THERAPY 1/> REGIONS: CPT

## 2021-07-22 PROCEDURE — 97112 NEUROMUSCULAR REEDUCATION: CPT

## 2021-07-22 NOTE — PROGRESS NOTES
Daily Note     Today's date: 2021  Patient name: Jason Young  : 1940     MRN: [id number]  Referring provider: Mary Harris MD  Dx:   Encounter Diagnosis     ICD-10-CM    1  Primary osteoarthritis of left knee  M17 12    2  Chronic pain of left knee  M25 562     G89 29    3  Acute pain of left knee  M25 562                   Subjective: Patient reports they scaled back her infusions a week due RBC & Platelets  She is fatigued today  Objective: See treatment diary below      Assessment: Tolerated treatment fair  Patient demonstrated fatigue post treatment  Patient ambulated slowly and cautiously, utilizing her [facility] furniture to maintain balance  Patient was unable to swing legs to table to transition from sit to supine without PTA assistance  Program modified due to fatigue and bradykinesia  Will continue to monitor fatigue and return to program as able  Plan: Continue per plan of care  Precautions: Active lung cancer treatment, OA     Manuals 7/8 7/15 7/22 6/28 7   Iliopsoas, quadricep, hamstring, ABD, gastroc PROM     7' NT due to pain    MFR to L hip flexors, and ITB 10'  8' NT due to pain 15'   PF Mobs                Neuro Re-Ed        Nu-Step  10 min L4 10 min L4 10 min L3 L 3 10 min   L4 10min   Monster walk 6x10' Held Held 6 x 10' 6x10' red   Side Step and Squat  6x10' 4x 10 ft No TB 6x 10 ft No TB 6 x 10' 6x10' red   Tandem Walk  6x10' 6x 10 ft 6x 10 ft 6 x 10' 6x10'   Standing SLR 3-way Bilat 15x ea Bilat 10x ea Bilat 15x ea Bilat 15 x B 15x each Bilat                   Ther Ex        Long arc quad - bilat 30x 20x 20x bilat 2 x 10 30x   Heel slides in supine         Stair heel and toe raises        Front and Lat step and heel tap down up 4" bilat 10x each forward and Lat Held Held  10x each forward and Lat 4 inch   Seated march 30x 10x 20x 2 x 10 30x    Supine SLR bilat  10x B 15x Bilat 15 x B    Self butterfly stretch supine 15" hd        S/L Hip Abd Bridges 30x 20x 15x 20 x  20x    HEP        Self hamstring stretching 30" hold Seated 3x ea 3x 3x   3 x 30" 3x30"   quad stretching with strap    1' 1' therapist           Gait Training                        Modalities        Cold Pack to L knee in seated        Hot Pack to L knee in seated

## 2021-07-26 ENCOUNTER — APPOINTMENT (OUTPATIENT)
Dept: PHYSICAL THERAPY | Facility: CLINIC | Age: 81
End: 2021-07-26
Payer: COMMERCIAL

## 2021-07-29 ENCOUNTER — EVALUATION (OUTPATIENT)
Dept: PHYSICAL THERAPY | Facility: CLINIC | Age: 81
End: 2021-07-29
Payer: COMMERCIAL

## 2021-07-29 DIAGNOSIS — M25.562 CHRONIC PAIN OF LEFT KNEE: ICD-10-CM

## 2021-07-29 DIAGNOSIS — G89.29 CHRONIC PAIN OF LEFT KNEE: ICD-10-CM

## 2021-07-29 DIAGNOSIS — M25.562 ACUTE PAIN OF LEFT KNEE: ICD-10-CM

## 2021-07-29 DIAGNOSIS — M17.12 PRIMARY OSTEOARTHRITIS OF LEFT KNEE: Primary | ICD-10-CM

## 2021-07-29 PROCEDURE — 97140 MANUAL THERAPY 1/> REGIONS: CPT | Performed by: PHYSICAL THERAPIST

## 2021-07-29 PROCEDURE — 97110 THERAPEUTIC EXERCISES: CPT | Performed by: PHYSICAL THERAPIST

## 2021-07-29 NOTE — PROGRESS NOTES
PT Re-Evaluation      Today's date: 2021  Patient name: Christina Avila  : 1940  MRN: [id number]  Referring provider: Thad Kaufman MD  Dx:   Encounter Diagnosis     ICD-10-CM    1  Chronic pain of left knee  M25 562     G89 29    2  Primary osteoarthritis of left knee  M17 12                   Assessment  Assessment details: Patient was found to have gross strength deficits in the LE as well as decreased active and passive ROM in the L knee and hip and the R hip  Pain in the medial L knee may have limited motion  Pt demonstrates gains in strength and ROM since her evaluationCourse of skilled therapy may be prolonged due to comprehensive PMH  Patient likely to need 6 weeks of PT twice a weak to address aforementioned deficits through strengthening, flexibility, and modalities  PT recommends patient does follow up with orthopaedic MD  PT feel that patient would be a good candidate for joint supplement injections  Evaluation and exercises performed Rebecca Rasmussen DPT  Impairments: abnormal gait, abnormal or restricted ROM, activity intolerance, impaired balance, impaired physical strength, lacks appropriate home exercise program, pain with function and poor posture   Understanding of Dx/Px/POC: good   Prognosis: fair    Goals  ST  Patient will have increased ROM in the hip and knee by 20-30% - MET  2  Patient will have increased gross strength in the LE by 1-2 MMT grades - MET  3  Patient will have decreased pain 1-2 on VAS - MET  4  Initiate HEP - MET    LT  Patient will have less difficulty with stair climbing - Progressing  2  Patient will have less difficulty with ascending and descending curbs - Progressing  3  Patient will have less difficulty with rising from chairs - MET  4  Discharge with HEP    Plan  Plan details: POC discussed with patient who was motivated to comply     Patient would benefit from: skilled physical therapy and PT eval  Planned modality interventions: cryotherapy and thermotherapy: hydrocollator packs  Planned therapy interventions: patient education, postural training, strengthening, stretching, therapeutic activities, therapeutic exercise, transfer training, home exercise program, gait training, functional ROM exercises, flexibility, coordination, balance and manual therapy  Frequency: 2x week  Duration in weeks: 6  Treatment plan discussed with: patient        Subjective Evaluation    History of Present Illness  Mechanism of injury: Patient reports gradual onset knee pain starting about 2 months ago when climbing stairs and getting in and out of chairs  She thought her knee pain was related to infusion treatments she has been undergoing for lung cancer treatment  PMH significant for lung cancer, OA, and HTN  (controlled)  Patient is not working  Pain  Current pain ratin  At best pain ratin  At worst pain rating: 10  Location: L medial knee, joint line, patella, and medial tibial plateau and medial femoral condyle  Quality: burning  Relieving factors: rest  Aggravating factors: stair climbing      Diagnostic Tests  X-ray: abnormal  Treatments  Current treatment: medication and physical therapy  Patient Goals  Patient goals for therapy: decreased pain, increased strength, improved balance, independence with ADLs/IADLs and return to sport/leisure activities          Objective     Palpation   Left   Muscle spasm in the iliopsoas  Tenderness of the iliopsoas and rectus femoris  Additional Palpation Details  Tenderness in the rectus femoris just superior to the patella     Tenderness     Left Hip   Tenderness in the greater trochanter  No tenderness in the PSIS and iliac crest    Left Knee   Tenderness in the inferior patella, medial joint line, medial patella, medial retinaculum, patellar tendon, pes anserinus, quadriceps tendon and superior patella  No tenderness in the MCL (distal) and MCL (proximal)       Neurological Testing     Reflexes   Left   Patellar (L4): normal (2+)  Achilles (S1): normal (2+)    Right   Patellar (L4): normal (2+)  Achilles (S1): normal (2+)    Active Range of Motion   Left Hip   Flexion: 55 degrees   External rotation (90/90): 0 degrees   Internal rotation (90/90): 0 degrees     Right Hip   Flexion: 91 degrees     Left Knee   Flexion: 115 degrees   Extension: -12 degrees     Right Knee   Flexion: [facility]    Mobility   Patellar Mobility:   Left Knee   WFL: medial, lateral, superior and inferior  Additional Mobility Details  Superior/Inferior motion produced pain     Strength/Myotome Testing     Left Hip   Planes of Motion   Flexion: 2+  Extension: 4  Abduction: 4-  Adduction: 3  External rotation: 3-  Internal rotation: 4    Right Hip   Planes of Motion   Flexion: 4  Extension: 5  Abduction: 4-  Adduction: 3  External rotation: 4-  Internal rotation: 4    Left Knee   Flexion: 4-  Extension: 4    Right Knee   Flexion: 4  Extension: 4    Tests     Left Knee   Negatiive valgus stress test at 30 degrees  Negative varus stress test at 30 degrees  Additional Tests Details  Grade 1 Valgus Stress Test     Ambulation     Ambulation: Level Surfaces   Ambulation without assistive device: independent    Additional Level Surfaces Ambulation Details  Patient ambulates independently on level surfaces with decreased eric, decreased step length, decreased knee/hip flexion, decreased foot clearance, decreased push off, flat foot contact and increased stance time on R LE  Precautions:  Active lung cancer treatment, OA      Manuals 7/8 7/15 7/22 7/29    Iliopsoas, quadricep, hamstring, ABD, gastroc PROM       7' 7'    MFR to L hip flexors, and ITB 10'   8' 8'    PF Mobs                       Neuro Re-Ed           Nu-Step  10 min L4 10 min L4 10 min L3 10 min L4    Monster walk 6x10' Held Held     Side Step and Squat  6x10' 4x 10 ft No TB 6x 10 ft No TB     Tandem Walk  6x10' 6x 10 ft 6x 10 ft     Standing SLR 3-way Bilat 15x ea Bilat 10x ea Bilat 15x ea Bilat hold                            Ther Ex           Long arc quad - bilat 30x 20x 20x bilat     Heel slides in supine         Add   Stair heel and toe raises           Front and Lat step and heel tap down up 4" bilat 10x each forward and Lat Held Held     Seated march 30x 10x 20x     Supine SLR bilat   10x B 15x Bilat hold    Self butterfly stretch supine 15" hd           S/L Hip Abd           Bridges 30x 20x 15x     HEP           Self hamstring stretching 30" hold Seated 3x ea 3x 3x     quad stretching with strap        Add   ITB stretch 30" hold    3x                                                Gait Training                                   Modalities           Cold Pack to L knee in seated           Hot Pack to L knee in seated

## 2021-08-02 ENCOUNTER — OFFICE VISIT (OUTPATIENT)
Dept: PHYSICAL THERAPY | Facility: CLINIC | Age: 81
End: 2021-08-02
Payer: COMMERCIAL

## 2021-08-02 DIAGNOSIS — G89.29 CHRONIC PAIN OF LEFT KNEE: ICD-10-CM

## 2021-08-02 DIAGNOSIS — M25.562 ACUTE PAIN OF LEFT KNEE: ICD-10-CM

## 2021-08-02 DIAGNOSIS — M17.12 PRIMARY OSTEOARTHRITIS OF LEFT KNEE: Primary | ICD-10-CM

## 2021-08-02 DIAGNOSIS — M25.562 CHRONIC PAIN OF LEFT KNEE: ICD-10-CM

## 2021-08-02 PROCEDURE — 97112 NEUROMUSCULAR REEDUCATION: CPT

## 2021-08-02 PROCEDURE — 97110 THERAPEUTIC EXERCISES: CPT

## 2021-08-02 PROCEDURE — 97140 MANUAL THERAPY 1/> REGIONS: CPT

## 2021-08-02 NOTE — PROGRESS NOTES
Daily Note     Today's date: 2021  Patient name: Shannan Chauhan  : 1940  MRN: [id number]  Referring provider: Joseline Byrd MD  Dx:   Encounter Diagnosis     ICD-10-CM    1  Primary osteoarthritis of left knee  M17 12    2  Chronic pain of left knee  M25 562     G89 29    3  Acute pain of left knee  M25 562                   Subjective: Pt reports that she put some pain ointment on her L knee with it now feeling better  Notes that her neck is bothering her for some reason today  Objective: See treatment diary below      Assessment: Tolerated treatment well able to perform all exercises asked of her to what she can handle  Increased L hamstring and ITB tightness compared to the R  Patient demonstrated fatigue post treatment, exhibited good technique with therapeutic exercises and would benefit from continued PT      Plan: Continue per plan of care  Precautions:  Active lung cancer treatment, OA      Manuals 7/8 7/15 7/22 7/29 8/2   Iliopsoas, quadricep, hamstring, ABD, gastroc PROM       7' 7' 7'   MFR to L hip flexors, and ITB 10'   8' 8' 8'   PF Mobs                       Neuro Re-Ed           Nu-Step  10 min L4 10 min L4 10 min L3 10 min L4 10 min L4   Monster walk 6x10' Held Held     Side Step and Squat  6x10' 4x 10 ft No TB 6x 10 ft No TB     Tandem Walk  6x10' 6x 10 ft 6x 10 ft     Standing SLR 3-way Bilat 15x ea Bilat 10x ea Bilat 15x ea Bilat hold                            Ther Ex           Long arc quad - bilat 30x 20x 20x bilat     Heel slides in supine         20x 5"   Stair heel and toe raises           Front and Lat step and heel tap down up 4" bilat 10x each forward and Lat Held Held     Seated march 30x 10x 20x     Supine SLR bilat   10x B 15x Bilat hold    Self butterfly stretch supine 15" hd           S/L Hip Abd           Bridges 30x 20x 15x     HEP           Self hamstring stretching 30" hold Seated 3x ea 3x 3x     quad stretching with strap        3x 30"   ITB stretch 30" hold    3x 3x                                               Gait Training                                   Modalities           Cold Pack to L knee in seated           Hot Pack to L knee in seated

## 2021-08-05 ENCOUNTER — APPOINTMENT (OUTPATIENT)
Dept: PHYSICAL THERAPY | Facility: CLINIC | Age: 81
End: 2021-08-05
Payer: COMMERCIAL

## 2021-08-09 ENCOUNTER — APPOINTMENT (OUTPATIENT)
Dept: PHYSICAL THERAPY | Facility: CLINIC | Age: 81
End: 2021-08-09
Payer: COMMERCIAL

## 2021-08-12 ENCOUNTER — APPOINTMENT (OUTPATIENT)
Dept: PHYSICAL THERAPY | Facility: CLINIC | Age: 81
End: 2021-08-12
Payer: COMMERCIAL

## 2021-08-16 ENCOUNTER — APPOINTMENT (OUTPATIENT)
Dept: PHYSICAL THERAPY | Facility: CLINIC | Age: 81
End: 2021-08-16
Payer: COMMERCIAL

## 2021-08-16 NOTE — PROGRESS NOTES
Daily Note     Today's date: 2021  Patient name: Varun Motley  : 1940  MRN: [id number]  Referring provider: Shailesh Tillman MD  Dx:   Encounter Diagnosis     ICD-10-CM    1  Primary osteoarthritis of left knee  M17 12    2  Chronic pain of left knee  M25 562     G89 29    3  Acute pain of left knee  M25 562                   Subjective: ***      Objective: See treatment diary below      Assessment: Tolerated treatment {Tolerated treatment :1858369192}  Patient {assessment:[id number]}      Plan: {PLAN:[id number]}     Precautions:  Active lung cancer treatment, OA      Manuals 7/8 7/15 7/22 7/29 82   Iliopsoas, quadricep, hamstring, ABD, gastroc PROM       7' 7' 7   MFR to L hip flexors, and ITB 10'   8' 8' 8   PF Mobs                       Neuro Re-Ed           Nu-Step  10 min L4 10 min L4 10 min L3 10 min L4 10 min L4   Monster walk 6x10' Held Held     Side Step and Squat  6x10' 4x 10 ft No TB 6x 10 ft No TB     Tandem Walk  6x10' 6x 10 ft 6x 10 ft     Standing SLR 3-way Bilat 15x ea Bilat 10x ea Bilat 15x ea Bilat hold                            Ther Ex           Long arc quad - bilat 30x 20x 20x bilat     Heel slides in supine         20x 5"   Stair heel and toe raises           Front and Lat step and heel tap down up 4" bilat 10x each forward and Lat Held Held     Seated march 30x 10x 20x     Supine SLR bilat   10x B 15x Bilat hold    Self butterfly stretch supine 15" hd           S/L Hip Abd           Bridges 30x 20x 15x     HEP           Self hamstring stretching 30" hold Seated 3x ea 3x 3x     quad stretching with strap        3x 30"   ITB stretch 30" hold    3x 3x                                               Gait Training                                   Modalities           Cold Pack to L knee in seated           Hot Pack to L knee in seated

## 2021-08-19 ENCOUNTER — APPOINTMENT (OUTPATIENT)
Dept: PHYSICAL THERAPY | Facility: CLINIC | Age: 81
End: 2021-08-19
Payer: COMMERCIAL

## 2021-08-23 ENCOUNTER — APPOINTMENT (OUTPATIENT)
Dept: PHYSICAL THERAPY | Facility: CLINIC | Age: 81
End: 2021-08-23
Payer: COMMERCIAL

## 2021-08-26 ENCOUNTER — APPOINTMENT (OUTPATIENT)
Dept: PHYSICAL THERAPY | Facility: CLINIC | Age: 81
End: 2021-08-26
Payer: COMMERCIAL

## 2021-08-26 NOTE — PROGRESS NOTES
Daily Note     Today's date: 2021  Patient name: Larry Myles  : 1940  MRN: [id number]  Referring provider: Jaycee Lennon MD  Dx:   Encounter Diagnosis     ICD-10-CM    1  Primary osteoarthritis of left knee  M17 12    2  Chronic pain of left knee  M25 562     G89 29    3  Acute pain of left knee  M25 562                   Subjective: ***      Objective: See treatment diary below      Assessment: Tolerated treatment {Tolerated treatment :3059105319}  Patient {assessment:[id number]}      Plan: {PLAN:[id number]}     Precautions:  Active lung cancer treatment, OA      Manuals 7/8 7/15 7/22 7/29 82   Iliopsoas, quadricep, hamstring, ABD, gastroc PROM       7' 7' 7   MFR to L hip flexors, and ITB 10'   8' 8' 8   PF Mobs                       Neuro Re-Ed           Nu-Step  10 min L4 10 min L4 10 min L3 10 min L4 10 min L4   Monster walk 6x10' Held Held     Side Step and Squat  6x10' 4x 10 ft No TB 6x 10 ft No TB     Tandem Walk  6x10' 6x 10 ft 6x 10 ft     Standing SLR 3-way Bilat 15x ea Bilat 10x ea Bilat 15x ea Bilat hold                            Ther Ex           Long arc quad - bilat 30x 20x 20x bilat     Heel slides in supine         20x 5"   Stair heel and toe raises           Front and Lat step and heel tap down up 4" bilat 10x each forward and Lat Held Held     Seated march 30x 10x 20x     Supine SLR bilat   10x B 15x Bilat hold    Self butterfly stretch supine 15" hd           S/L Hip Abd           Bridges 30x 20x 15x     HEP           Self hamstring stretching 30" hold Seated 3x ea 3x 3x     quad stretching with strap        3x 30"   ITB stretch 30" hold    3x 3x                                               Gait Training                                   Modalities           Cold Pack to L knee in seated           Hot Pack to L knee in seated

## 2022-06-06 ENCOUNTER — EVALUATION (OUTPATIENT)
Dept: PHYSICAL THERAPY | Facility: CLINIC | Age: 82
End: 2022-06-06
Payer: COMMERCIAL

## 2022-06-06 DIAGNOSIS — M25.552 LEFT HIP PAIN: ICD-10-CM

## 2022-06-06 DIAGNOSIS — Z47.1 AFTERCARE FOLLOWING LEFT HIP JOINT REPLACEMENT SURGERY: ICD-10-CM

## 2022-06-06 DIAGNOSIS — M16.12 UNILATERAL PRIMARY OSTEOARTHRITIS, LEFT HIP: Primary | ICD-10-CM

## 2022-06-06 DIAGNOSIS — Z96.642 AFTERCARE FOLLOWING LEFT HIP JOINT REPLACEMENT SURGERY: ICD-10-CM

## 2022-06-06 PROCEDURE — 97161 PT EVAL LOW COMPLEX 20 MIN: CPT | Performed by: PHYSICAL THERAPIST

## 2022-06-06 NOTE — LETTER
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  Z47 1     [id number]        Dear Dr Missy Lugo: Thank you for your recent referral of Tom Reis  Please review the attached evaluation summary from [address] [place] [address] recent visit  Please verify that you agree with the plan of care by signing the attached order  If you have any questions or concerns, please do not hesitate to call  I sincerely appreciate the opportunity to share in the care of one of your patients and hope to have another opportunity to work with you in the near future  Sincerely,    Yaniv Germain, PT      Referring Provider:      I certify that I have read the below Plan of Care and certify the need for these services furnished under this plan of treatment while under my care  MD Ginna Bowie 86 [phone]  Via Fax: [phone]          PT Evaluation     Today's date: 2022  Patient name: Tom Reis  : 1940  MRN: [id number]  Referring provider: Shannan Cali MD  Dx:   Encounter Diagnosis     ICD-10-CM    1  Unilateral primary osteoarthritis, left hip  M16 12    2  Left hip pain  M25 552                   Assessment  Assessment details: Tom Reis is a [de-identified] y o  female who presents with pain, decreased strength, decreased ROM and ambulatory dysfunction  Due to these impairments, patient has difficulty performing ADL's, ambulation  Patient's clinical presentation is consistent with their referring diagnosis of Unilateral primary osteoarthritis, left hip  (primary encounter diagnosis)    Left hip pain    Aftercare following left hip joint replacement surgery    Patient has been educated in home exercise program and plan of care  Patient would benefit from skilled physical therapy services to address their aforementioned functional limitations and progress towards prior level of function and independence with home exercise program      Impairments: abnormal muscle firing, abnormal or restricted ROM, activity intolerance, impaired physical strength, lacks appropriate home exercise program and pain with function  Understanding of Dx/Px/POC: good   Prognosis: good    Goals  Short Term Goals:    1  Initiate and advance HEP  2  AROM Left Hip flexion 90 degrees  3  AROM Left knee ext 0 degrees    Long Term Goals:    1  Indep with HEP  2  Walk  3  Sit to stand    Plan  Patient would benefit from: skilled PT  Planned modality interventions: cryotherapy, electrical stimulation/Russian stimulation and thermotherapy: hydrocollator packs  Planned therapy interventions: joint mobilization, manual therapy, patient education, postural training, activity modification, abdominal trunk stabilization, body mechanics training, flexibility, functional ROM exercises, graded exercise, home exercise program, neuromuscular re-education, strengthening, stretching, therapeutic activities, therapeutic exercise, motor coordination training, muscle pump exercises, gait training, balance/weight bearing training and ADL training  Frequency: 2x week  Duration in weeks: 8  Treatment plan discussed with: patient        Subjective Evaluation    History of Present Illness  Mechanism of injury: Pt reports Left hip pain began gradually in   Pt with L THR on 6/3/2022    Quality of life: good    Pain  Current pain ratin  At best pain ratin  At worst pain rating: 10  Quality: tight, burning and throbbing  Relieving factors: ice and medications  Aggravating factors: sitting  Progression: improved    Treatments  Previous treatment: physical therapy  Current treatment: medication and physical therapy  Patient Goals  Patient goals for therapy: decreased edema, decreased pain, increased motion, increased strength and independence with ADLs/IADLs          Objective     Active Range of Motion   Left Hip   Flexion: 21 degrees     Right Hip   Flexion: 90 degrees   External rotation (90/90): 22 degrees   Internal rotation (90/90): 8 degrees   Left Knee   Flexion: 80 degrees   Extensor la degrees     Right Knee   Flexion: 129 degrees   Extensor la degrees     Strength/Myotome Testing     Left Hip   Planes of Motion   Flexion: 2-    Right Hip   Planes of Motion   Flexion: 4-  External rotation: 4-  Internal rotation: 2-    Left Knee   Flexion: 2+  Extension: 2+    Right Knee   Flexion: 4-  Extension: 2+    Tests     Left Hip   Positive Ely's  Right Hip   Positive Ely's       Additional Tests Details  Hamstring Flexibility:  R: -70  L:  -89    Noble's:  R: -  L: +    Ambulation     Comments   TU' 26 39" with RW             Precautions: HTN, SOB, Hip Precautions      Manuals         PROM L Hip          MFR TO L ITB                           Neuro Re-Ed         Rhomberg Stance  ADD       Sharpened Rhomberg         Tandem Stance         Weight Shifts   ADD                                  Ther Ex         Nu Step  ADD       TM         Supine Hip Flexor Stretch         Supine Hamstring Stretch 30" hold  ADD       Marching         Mini Squats         Monster Walks         Side Steps         Step Ups                           Ther Activity                           Gait Training                           Modalities

## 2022-06-06 NOTE — PROGRESS NOTES
PT Evaluation     Today's date: 2022  Patient name: Lacey El  : 1940  MRN: [id number]  Referring provider: Zak Gloria MD  Dx:   Encounter Diagnosis     ICD-10-CM    1  Unilateral primary osteoarthritis, left hip  M16 12    2  Left hip pain  M25 552                   Assessment  Assessment details: Lacey El is a [de-identified] y o  female who presents with pain, decreased strength, decreased ROM and ambulatory dysfunction  Due to these impairments, patient has difficulty performing ADL's, ambulation  Patient's clinical presentation is consistent with their referring diagnosis of Unilateral primary osteoarthritis, left hip  (primary encounter diagnosis)    Left hip pain    Aftercare following left hip joint replacement surgery    Patient has been educated in home exercise program and plan of care  Patient would benefit from skilled physical therapy services to address their aforementioned functional limitations and progress towards prior level of function and independence with home exercise program      Impairments: abnormal muscle firing, abnormal or restricted ROM, activity intolerance, impaired physical strength, lacks appropriate home exercise program and pain with function  Understanding of Dx/Px/POC: good   Prognosis: good    Goals  Short Term Goals:    1  Initiate and advance HEP  2  AROM Left Hip flexion 90 degrees  3  AROM Left knee ext 0 degrees    Long Term Goals:    1  Indep with HEP  2  Walk  3   Sit to stand    Plan  Patient would benefit from: skilled PT  Planned modality interventions: cryotherapy, electrical stimulation/Russian stimulation and thermotherapy: hydrocollator packs  Planned therapy interventions: joint mobilization, manual therapy, patient education, postural training, activity modification, abdominal trunk stabilization, body mechanics training, flexibility, functional ROM exercises, graded exercise, home exercise program, neuromuscular re-education, strengthening, stretching, therapeutic activities, therapeutic exercise, motor coordination training, muscle pump exercises, gait training, balance/weight bearing training and ADL training  Frequency: 2x week  Duration in weeks: 8  Treatment plan discussed with: patient        Subjective Evaluation    History of Present Illness  Mechanism of injury: Pt reports Left hip pain began gradually in   Pt with L THR on 6/3/2022  Quality of life: good    Pain  Current pain ratin  At best pain ratin  At worst pain rating: 10  Quality: tight, burning and throbbing  Relieving factors: ice and medications  Aggravating factors: sitting  Progression: improved    Treatments  Previous treatment: physical therapy  Current treatment: medication and physical therapy  Patient Goals  Patient goals for therapy: decreased edema, decreased pain, increased motion, increased strength and independence with ADLs/IADLs          Objective     Active Range of Motion   Left Hip   Flexion: 21 degrees     Right Hip   Flexion: 90 degrees   External rotation (90/90): 22 degrees   Internal rotation (90/90): 8 degrees   Left Knee   Flexion: 80 degrees   Extensor la degrees     Right Knee   Flexion: 129 degrees   Extensor la degrees     Strength/Myotome Testing     Left Hip   Planes of Motion   Flexion: 2-    Right Hip   Planes of Motion   Flexion: 4-  External rotation: 4-  Internal rotation: 2-    Left Knee   Flexion: 2+  Extension: 2+    Right Knee   Flexion: 4-  Extension: 2+    Tests     Left Hip   Positive Ely's  Right Hip   Positive Ely's       Additional Tests Details  Hamstring Flexibility:  R: -70  L:  -89    Noble's:  R: -  L: +    Ambulation     Comments   TU' 26 39" with RW             Precautions: HTN, SOB, Hip Precautions      Manuals         PROM L Hip          MFR TO L ITB                           Neuro Re-Ed         Rhomberg Stance  ADD       Sharpened Rhomberg         Tandem Stance         Weight Shifts   ADD Ther Ex         Nu Step  ADD       TM         Supine Hip Flexor Stretch         Supine Hamstring Stretch 30" hold  ADD       Marching         Mini Squats         Monster Walks         Side Steps         Step Ups                           Ther Activity                           Gait Training                           Modalities

## 2022-06-09 ENCOUNTER — OFFICE VISIT (OUTPATIENT)
Dept: PHYSICAL THERAPY | Facility: CLINIC | Age: 82
End: 2022-06-09
Payer: COMMERCIAL

## 2022-06-09 DIAGNOSIS — M25.552 LEFT HIP PAIN: Primary | ICD-10-CM

## 2022-06-09 DIAGNOSIS — Z96.642 AFTERCARE FOLLOWING LEFT HIP JOINT REPLACEMENT SURGERY: ICD-10-CM

## 2022-06-09 DIAGNOSIS — Z47.1 AFTERCARE FOLLOWING LEFT HIP JOINT REPLACEMENT SURGERY: ICD-10-CM

## 2022-06-09 DIAGNOSIS — M16.12 UNILATERAL PRIMARY OSTEOARTHRITIS, LEFT HIP: ICD-10-CM

## 2022-06-09 PROCEDURE — 97110 THERAPEUTIC EXERCISES: CPT | Performed by: PHYSICAL THERAPIST

## 2022-06-09 PROCEDURE — 97140 MANUAL THERAPY 1/> REGIONS: CPT | Performed by: PHYSICAL THERAPIST

## 2022-06-09 PROCEDURE — 97112 NEUROMUSCULAR REEDUCATION: CPT | Performed by: PHYSICAL THERAPIST

## 2022-06-14 ENCOUNTER — OFFICE VISIT (OUTPATIENT)
Dept: PHYSICAL THERAPY | Facility: CLINIC | Age: 82
End: 2022-06-14
Payer: COMMERCIAL

## 2022-06-14 DIAGNOSIS — M25.552 LEFT HIP PAIN: Primary | ICD-10-CM

## 2022-06-14 DIAGNOSIS — M16.12 UNILATERAL PRIMARY OSTEOARTHRITIS, LEFT HIP: ICD-10-CM

## 2022-06-14 PROCEDURE — 97110 THERAPEUTIC EXERCISES: CPT

## 2022-06-14 PROCEDURE — 97112 NEUROMUSCULAR REEDUCATION: CPT

## 2022-06-14 PROCEDURE — 97140 MANUAL THERAPY 1/> REGIONS: CPT

## 2022-06-14 NOTE — PROGRESS NOTES
Daily Note     Today's date: 2022  Patient name: Lacey El  : 1940  MRN: [id number]  Referring provider: Zak Gloria MD  Dx:   Encounter Diagnosis     ICD-10-CM    1  Left hip pain  M25 552    2  Unilateral primary osteoarthritis, left hip  M16 12                   Subjective: patient expressed her concern with not being as far along with therapy as she should be  Objective: See treatment diary below      Assessment: patient has tendency to compensate hip abduction by hiking hip  Patient able to advance with program with good tolerance  Patient amb well with RW and may be able to start transitioning to cane next visit  Verbal, visual and manual cues required t/o session  Tight hip adductor muscles during manual   Patient would benefit from continued therapy to decrease pain and increase strength and flexibility to improve overall LOF      Plan: Continue per plan of care  Progress treatment as tolerated         Precautions: HTN, SOB, Hip Precautions      Manuals       PROM L Hip   15' 10'      MFR TO L ITB                           Neuro Re-Ed         Rhomberg Stance  3x30" D/C      Sharpened Rhomberg 30" hd    3x       Tandem Stance         Weight Shifts   30x 30x                                 Ther Ex         Nu Step  L3 6 min L3 7 min      TM         Standing hip 3 way    10x bilat      Supine Hip Flexor Stretch   1' 15"      Supine Hamstring Stretch 30" hold  3x 3x      Marching   20x      Mini Squats         Monster Walks   4x 10 ft       Side Steps   4x 10 ft       Step Ups                           Ther Activity                           Gait Training                           Modalities

## 2022-06-16 ENCOUNTER — APPOINTMENT (OUTPATIENT)
Dept: PHYSICAL THERAPY | Facility: CLINIC | Age: 82
End: 2022-06-16
Payer: COMMERCIAL

## 2022-06-21 ENCOUNTER — APPOINTMENT (OUTPATIENT)
Dept: PHYSICAL THERAPY | Facility: CLINIC | Age: 82
End: 2022-06-21
Payer: COMMERCIAL

## 2022-06-23 ENCOUNTER — OFFICE VISIT (OUTPATIENT)
Dept: PHYSICAL THERAPY | Facility: CLINIC | Age: 82
End: 2022-06-23
Payer: COMMERCIAL

## 2022-06-23 DIAGNOSIS — Z96.642 AFTERCARE FOLLOWING LEFT HIP JOINT REPLACEMENT SURGERY: ICD-10-CM

## 2022-06-23 DIAGNOSIS — Z47.1 AFTERCARE FOLLOWING LEFT HIP JOINT REPLACEMENT SURGERY: ICD-10-CM

## 2022-06-23 DIAGNOSIS — M16.12 UNILATERAL PRIMARY OSTEOARTHRITIS, LEFT HIP: ICD-10-CM

## 2022-06-23 DIAGNOSIS — M25.552 LEFT HIP PAIN: Primary | ICD-10-CM

## 2022-06-23 PROCEDURE — 97110 THERAPEUTIC EXERCISES: CPT

## 2022-06-23 PROCEDURE — 97140 MANUAL THERAPY 1/> REGIONS: CPT

## 2022-06-23 PROCEDURE — 97112 NEUROMUSCULAR REEDUCATION: CPT

## 2022-06-23 NOTE — PROGRESS NOTES
Daily Note     Today's date: 2022  Patient name: Darshan Issa  : 1940  MRN: [id number]  Referring provider: Tony Silva MD  Dx:   Encounter Diagnosis     ICD-10-CM    1  Left hip pain  M25 552    2  Unilateral primary osteoarthritis, left hip  M16 12    3  Aftercare following left hip joint replacement surgery  Z47 1     [id number]                   Subjective: patient stated her hip hurts when she over works at home  She also reported only having mild sx from having COVID      Objective: See treatment diary below      Assessment: gait training with cane today  Patient was able to successfully perform a step through gait and need to put minimal wt through the gait  Patient was limited with her AROM close chain exercises in a pain free range  Plan: Continue per plan of care  Progress treatment as tolerated         Precautions: HTN, SOB, Hip Precautions      Manuals      PROM L Hip   15' 10' 10'     MFR TO L ITB                           Neuro Re-Ed         Rhomberg Stance  3x30" D/C      Sharpened Rhomberg 30" hd    3x  3x     Tandem Stance    2x     Weight Shifts   30x 30x 30x D/C                               Ther Ex         Nu Step  L3 6 min L3 7 min L3 8 min      TM         Standing hip 3 way    10x bilat 15x bilat     Supine Hip Flexor Stretch   1' 15" 1' 30"     Supine Hamstring Stretch 30" hold  3x 3x 3x     Marching   20x 20x     Mini Squats         Monster Walks   4x 10 ft  4x 10 ft      Side Steps   4x 10 ft  4x 10 ft     Step Ups                           Ther Activity                           Gait Training             10'              Modalities

## 2022-06-28 ENCOUNTER — OFFICE VISIT (OUTPATIENT)
Dept: PHYSICAL THERAPY | Facility: CLINIC | Age: 82
End: 2022-06-28
Payer: COMMERCIAL

## 2022-06-28 DIAGNOSIS — M25.552 LEFT HIP PAIN: Primary | ICD-10-CM

## 2022-06-28 DIAGNOSIS — Z47.1 AFTERCARE FOLLOWING LEFT HIP JOINT REPLACEMENT SURGERY: ICD-10-CM

## 2022-06-28 DIAGNOSIS — M16.12 UNILATERAL PRIMARY OSTEOARTHRITIS, LEFT HIP: ICD-10-CM

## 2022-06-28 DIAGNOSIS — Z96.642 AFTERCARE FOLLOWING LEFT HIP JOINT REPLACEMENT SURGERY: ICD-10-CM

## 2022-06-28 PROCEDURE — 97140 MANUAL THERAPY 1/> REGIONS: CPT

## 2022-06-28 PROCEDURE — 97110 THERAPEUTIC EXERCISES: CPT

## 2022-06-28 PROCEDURE — 97112 NEUROMUSCULAR REEDUCATION: CPT

## 2022-06-28 NOTE — PROGRESS NOTES
Daily Note     Today's date: 2022  Patient name: Lidia Hsu  : 1940  MRN: [id number]  Referring provider: Nathaly Coelho MD  Dx:   Encounter Diagnosis     ICD-10-CM    1  Left hip pain  M25 552    2  Unilateral primary osteoarthritis, left hip  M16 12    3  Aftercare following left hip joint replacement surgery  Z47 1     [id number]                   Subjective: patient stated she still does not feel confident walking with the cane       Objective: See treatment diary below      Assessment: patient amb with a short step length but good sequencing and posture  Patient able to progress with repetitions and resistance with various exercises  Good tolerance with progression with minimal cues required  Patient continues to present with deficits with strength and ROM requiring continued skilled physical therapy      Plan: Continue per plan of care  Progress treatment as tolerated    recommend adding treadmill      Precautions: HTN, SOB, Hip Precautions      Manuals     PROM L Hip   15' 10' 10' 10'    MFR TO L ITB                           Neuro Re-Ed         Rhomberg Stance  3x30" D/C      Sharpened Rhomberg 30" hd    3x  3x 3x    Tandem Stance 30" hd     2x 2x    Weight Shifts   30x 30x 30x D/C                               Ther Ex         Nu Step  L3 6 min L3 7 min L3 8 min  L5 10 min     TM         Standing hip 3 way    10x bilat 15x bilat 20 bilat     Supine Hip Flexor Stretch   1' 15" 1' 30"     Supine Hamstring Stretch 30" hold  3x 3x 3x 3x    Marching   20x 20x 20x    Mini Squats     10x    Monster Walks   4x 10 ft  4x 10 ft  6x 10 ft    Side Steps   4x 10 ft  4x 10 ft 6x 10 ft    Step Ups                           Ther Activity                           Gait Training             10' 5'             Modalities

## 2022-06-30 ENCOUNTER — OFFICE VISIT (OUTPATIENT)
Dept: PHYSICAL THERAPY | Facility: CLINIC | Age: 82
End: 2022-06-30
Payer: COMMERCIAL

## 2022-06-30 DIAGNOSIS — Z47.1 AFTERCARE FOLLOWING LEFT HIP JOINT REPLACEMENT SURGERY: ICD-10-CM

## 2022-06-30 DIAGNOSIS — M16.12 UNILATERAL PRIMARY OSTEOARTHRITIS, LEFT HIP: ICD-10-CM

## 2022-06-30 DIAGNOSIS — Z96.642 AFTERCARE FOLLOWING LEFT HIP JOINT REPLACEMENT SURGERY: ICD-10-CM

## 2022-06-30 DIAGNOSIS — M25.552 LEFT HIP PAIN: Primary | ICD-10-CM

## 2022-06-30 PROCEDURE — 97140 MANUAL THERAPY 1/> REGIONS: CPT

## 2022-06-30 PROCEDURE — 97112 NEUROMUSCULAR REEDUCATION: CPT

## 2022-06-30 PROCEDURE — 97110 THERAPEUTIC EXERCISES: CPT

## 2022-06-30 NOTE — PROGRESS NOTES
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  Z47 1     [id number]                   Subjective: Patent reports L hip has been doing much better recently  Objective: See treatment diary below      Assessment: Tolerated treatment progressions well with no increased L hip pain  Fair tolerance to PROM with moderate limitations in all directions stretched as able per precautions  Moderate fatigue with step up addition today  Patient would benefit from continued PT to increase L hip strength and mobility for improved function in ADLs  Plan: Continue per plan of care        Precautions: HTN, SOB, Hip Precautions      Manuals    PROM L Hip   15' 10' 10' 10' 10'   MFR TO L ITB                           Neuro Re-Ed         Rhomberg Stance  3x30" D/C      Sharpened Rhomberg 30" hd    3x  3x 3x 3x   Tandem Stance 30" hd     2x 2x 3x   Weight Shifts   30x 30x 30x D/C                               Ther Ex         Nu Step  L3 6 min L3 7 min L3 8 min  L5 10 min  L5 10'   TM         Standing hip 3 way    10x bilat 15x bilat 20 bilat  20x bilat   Supine Hip Flexor Stretch   1' 15" 1' 30"  1'x2 bilat   Supine Hamstring Stretch 30" hold  3x 3x 3x 3x 3x bilat   Marching   20x 20x 20x 20x   Mini Squats     10x 15x   Monster Walks   4x 10 ft  4x 10 ft  6x 10 ft 6x 10 ft   Side Steps   4x 10 ft  4x 10 ft 6x 10 ft 6x 10 ft   Step Ups      4" 15x                     Ther Activity                           Gait Training             10' 5'             Modalities

## 2022-07-05 ENCOUNTER — OFFICE VISIT (OUTPATIENT)
Dept: PHYSICAL THERAPY | Facility: CLINIC | Age: 82
End: 2022-07-05
Payer: COMMERCIAL

## 2022-07-05 DIAGNOSIS — Z47.1 AFTERCARE FOLLOWING LEFT HIP JOINT REPLACEMENT SURGERY: ICD-10-CM

## 2022-07-05 DIAGNOSIS — M25.552 LEFT HIP PAIN: Primary | ICD-10-CM

## 2022-07-05 DIAGNOSIS — M16.12 UNILATERAL PRIMARY OSTEOARTHRITIS, LEFT HIP: ICD-10-CM

## 2022-07-05 DIAGNOSIS — Z96.642 AFTERCARE FOLLOWING LEFT HIP JOINT REPLACEMENT SURGERY: ICD-10-CM

## 2022-07-05 PROCEDURE — 97110 THERAPEUTIC EXERCISES: CPT | Performed by: PHYSICAL THERAPIST

## 2022-07-05 PROCEDURE — 97140 MANUAL THERAPY 1/> REGIONS: CPT | Performed by: PHYSICAL THERAPIST

## 2022-07-05 NOTE — PROGRESS NOTES
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  Z47 1     [id number]                   Subjective: Pt reports she "overdid it" yesterday making her sore today      Objective: See treatment diary below      Assessment: Tolerated treatment well  Patient would benefit from continued PT      Plan: Progress treatment as tolerated         Precautions: HTN, SOB, Hip Precautions      Manuals    PROM L Hip  10'  10' 10' 10' 10'   MFR TO L ITB                           Neuro Re-Ed         Rhomberg Stance   D/C      Sharpened Rhomberg 30" hd  3x  3x  3x 3x 3x   Tandem Stance 30" hd  3x   2x 2x 3x   Weight Shifts    30x 30x D/C                               Ther Ex         Nu Step L5 10'  L3 7 min L3 8 min  L5 10 min  L5 10'   TM         Standing hip 3 way  20  10x bilat 15x bilat 20 bilat  20x bilat   Supine Hip Flexor Stretch 1 5'  1' 15" 1' 30"  1'x2 bilat   Supine Hamstring Stretch 30" hold 3x  3x 3x 3x 3x bilat   Marching 20x  20x 20x 20x 20x   Mini Squats 15x    10x 15x   Monster Walks 6x 10'  4x 10 ft  4x 10 ft  6x 10 ft 6x 10 ft   Side Steps 6x 10'  4x 10 ft  4x 10 ft 6x 10 ft 6x 10 ft   Step Ups 15x 4"     4" 15x                     Ther Activity                           Gait Training             10' 5'             Modalities

## 2022-07-07 ENCOUNTER — EVALUATION (OUTPATIENT)
Dept: PHYSICAL THERAPY | Facility: CLINIC | Age: 82
End: 2022-07-07
Payer: COMMERCIAL

## 2022-07-07 DIAGNOSIS — M25.552 LEFT HIP PAIN: Primary | ICD-10-CM

## 2022-07-07 DIAGNOSIS — M16.12 UNILATERAL PRIMARY OSTEOARTHRITIS, LEFT HIP: ICD-10-CM

## 2022-07-07 DIAGNOSIS — Z96.642 AFTERCARE FOLLOWING LEFT HIP JOINT REPLACEMENT SURGERY: ICD-10-CM

## 2022-07-07 DIAGNOSIS — Z47.1 AFTERCARE FOLLOWING LEFT HIP JOINT REPLACEMENT SURGERY: ICD-10-CM

## 2022-07-07 PROCEDURE — 97140 MANUAL THERAPY 1/> REGIONS: CPT | Performed by: PHYSICAL THERAPIST

## 2022-07-07 PROCEDURE — 97110 THERAPEUTIC EXERCISES: CPT | Performed by: PHYSICAL THERAPIST

## 2022-07-07 NOTE — PROGRESS NOTES
PT Evaluation     Today's date: 2022  Patient name: Gail Ritter  : 1940  MRN: [id number]  Referring provider: Yolette Johnson MD  Dx:   Encounter Diagnosis     ICD-10-CM    1  Left hip pain  M25 552    2  Unilateral primary osteoarthritis, left hip  M16 12    3  Aftercare following left hip joint replacement surgery  Z47 1     [id number]                   Assessment  Assessment details: Gail Ritter is a [de-identified] y o  female who presents with pain, decreased strength, decreased ROM and ambulatory dysfunction  Due to these impairments, patient has difficulty performing ADL's, ambulation  Patient demonstrates improving strength, ROM, and flexibility  Patient's clinical presentation is consistent with their referring diagnosis of Unilateral primary osteoarthritis, left hip  (primary encounter diagnosis)    Left hip pain    Aftercare following left hip joint replacement surgery    Patient has been educated in home exercise program and plan of care  Patient would benefit from skilled physical therapy services to address their aforementioned functional limitations and progress towards prior level of function and independence with home exercise program      Impairments: abnormal muscle firing, abnormal or restricted ROM, activity intolerance, impaired physical strength, lacks appropriate home exercise program and pain with function  Understanding of Dx/Px/POC: good   Prognosis: good    Goals  Short Term Goals:    1  Initiate and advance HEP - Partially MET  2  AROM Left Hip flexion 90 degrees - Partially MET  3  AROM Left knee ext 0 degrees - Partially MET    Long Term Goals:    1  Indep with HEP  2  Walk - Partially MET  3   Sit to stand - MET    Plan  Plan details: Continue to improve strength, ROM, and function  Patient would benefit from: skilled PT  Planned modality interventions: cryotherapy, electrical stimulation/Russian stimulation and thermotherapy: hydrocollator packs  Planned therapy interventions: joint mobilization, manual therapy, patient education, postural training, activity modification, abdominal trunk stabilization, body mechanics training, flexibility, functional ROM exercises, graded exercise, home exercise program, neuromuscular re-education, strengthening, stretching, therapeutic activities, therapeutic exercise, motor coordination training, muscle pump exercises, gait training, balance/weight bearing training and ADL training  Frequency: 2x week  Duration in weeks: 6  Treatment plan discussed with: patient        Subjective Evaluation    History of Present Illness  Mechanism of injury: Pt reports Left hip pain began gradually in   Pt with L THR on 6/3/2022  Quality of life: good    Pain  Current pain ratin  At best pain ratin  At worst pain ratin  Quality: tight  Relieving factors: ice and medications  Aggravating factors: sitting  Progression: improved    Treatments  Previous treatment: physical therapy  Current treatment: medication and physical therapy  Patient Goals  Patient goals for therapy: decreased edema, decreased pain, increased motion, increased strength and independence with ADLs/IADLs          Objective     Active Range of Motion   Left Hip   Flexion: 62 degrees     Right Hip   Flexion: 90 degrees   External rotation (90/90): 22 degrees   Internal rotation (90/90): 8 degrees   Left Knee   Flexion: 111 degrees   Extensor la degrees     Right Knee   Flexion: 129 degrees   Extensor lag: 15 degrees     Strength/Myotome Testing     Left Hip   Planes of Motion   Flexion: 2    Right Hip   Planes of Motion   Flexion: 4-  External rotation: 4-  Internal rotation: 2-    Left Knee   Flexion: 2+  Extension: 2+    Right Knee   Flexion: 4-  Extension: 2+    Tests     Left Hip   Positive Ely's  Right Hip   Positive Ely's       Additional Tests Details  Hamstring Flexibility:  R: -37  L:  -62    Noble's:  R: -  L: +    Ambulation     Comments   TU' 26 39" with RW Precautions: HTN, SOB, Hip Precautions        Manuals 7/5 7/7 6/23 6/28 6/30   PROM L Hip  10'    10' 10' 10'   MFR TO L ITB                                            Neuro Re-Ed              Rhomberg Stance              Sharpened Rhomberg 30" hd  3x    3x 3x 3x   Tandem Stance 30" hd  3x    2x 2x 3x   Weight Shifts       30x D/C                                                  Ther Ex              Nu Step L5 10'  L5 10'  L3 8 min  L5 10 min  L5 10'   TM              Standing hip 3 way  20    15x bilat 20 bilat  20x bilat   Supine Hip Flexor Stretch 1 5'    1' 30"   1'x2 bilat   Supine Hamstring Stretch 30" hold 3x    3x 3x 3x bilat   Marching 20x    20x 20x 20x   Mini Squats 15x      10x 15x   Monster Walks 6x 10'    4x 10 ft  6x 10 ft 6x 10 ft   Side Steps 6x 10'    4x 10 ft 6x 10 ft 6x 10 ft   Step Ups 15x 4"        4" 15x                                 Ther Activity                                            Gait Training                     10' 5'                    Modalities

## 2022-07-07 NOTE — LETTER
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  Z47 1     [id number]        Dear Dr Barrie Ventura: Thank you for your recent referral of Rachna Garcia  Please review the attached evaluation summary from [address] [place] [address] recent visit  Please verify that you agree with the plan of care by signing the attached order  If you have any questions or concerns, please do not hesitate to call  I sincerely appreciate the opportunity to share in the care of one of your patients and hope to have another opportunity to work with you in the near future  Sincerely,    Jose Kirkpatrick, PT      Referring Provider:      I certify that I have read the below Plan of Care and certify the need for these services furnished under this plan of treatment while under my care  MD Ginna Arango [address] [phone]  Via Fax: [phone]          PT Evaluation     Today's date: 2022  Patient name: Rachna Garcia  : 1940  MRN: [id number]  Referring provider: Matilda Zamora MD  Dx:   Encounter Diagnosis     ICD-10-CM    1  Left hip pain  M25 552    2  Unilateral primary osteoarthritis, left hip  M16 12    3  Aftercare following left hip joint replacement surgery  Z47 1     [id number] [id number]                   Assessment  Assessment details: Rachna Garcia is a [de-identified] y o  female who presents with pain, decreased strength, decreased ROM and ambulatory dysfunction  Due to these impairments, patient has difficulty performing ADL's, ambulation  Patient demonstrates improving strength, ROM, and flexibility    Patient's clinical presentation is consistent with their referring diagnosis of Unilateral primary osteoarthritis, left hip  (primary encounter diagnosis)    Left hip pain    Aftercare following left hip joint replacement surgery    Patient has been educated in home exercise program and plan of care  Patient would benefit from skilled physical therapy services to address their aforementioned functional limitations and progress towards prior level of function and independence with home exercise program      Impairments: abnormal muscle firing, abnormal or restricted ROM, activity intolerance, impaired physical strength, lacks appropriate home exercise program and pain with function  Understanding of Dx/Px/POC: good   Prognosis: good    Goals  Short Term Goals:    1  Initiate and advance HEP - Partially MET  2  AROM Left Hip flexion 90 degrees - Partially MET  3  AROM Left knee ext 0 degrees - Partially MET    Long Term Goals:    1  Indep with HEP  2  Walk - Partially MET  3  Sit to stand - MET    Plan  Plan details: Continue to improve strength, ROM, and function  Patient would benefit from: skilled PT  Planned modality interventions: cryotherapy, electrical stimulation/Russian stimulation and thermotherapy: hydrocollator packs  Planned therapy interventions: joint mobilization, manual therapy, patient education, postural training, activity modification, abdominal trunk stabilization, body mechanics training, flexibility, functional ROM exercises, graded exercise, home exercise program, neuromuscular re-education, strengthening, stretching, therapeutic activities, therapeutic exercise, motor coordination training, muscle pump exercises, gait training, balance/weight bearing training and ADL training  Frequency: 2x week  Duration in weeks: 6  Treatment plan discussed with: patient        Subjective Evaluation    History of Present Illness  Mechanism of injury: Pt reports Left hip pain began gradually in   Pt with L THR on 6/3/2022    Quality of life: good    Pain  Current pain ratin  At best pain ratin  At worst pain ratin  Quality: tight  Relieving factors: ice and medications  Aggravating factors: sitting  Progression: improved    Treatments  Previous treatment: physical therapy  Current treatment: medication and physical therapy  Patient Goals  Patient goals for therapy: decreased edema, decreased pain, increased motion, increased strength and independence with ADLs/IADLs          Objective     Active Range of Motion   Left Hip   Flexion: 62 degrees     Right Hip   Flexion: 90 degrees   External rotation (90/90): 22 degrees   Internal rotation (90/90): 8 degrees   Left Knee   Flexion: 111 degrees   Extensor la degrees     Right Knee   Flexion: 129 degrees   Extensor lag: 15 degrees     Strength/Myotome Testing     Left Hip   Planes of Motion   Flexion: 2    Right Hip   Planes of Motion   Flexion: 4-  External rotation: 4-  Internal rotation: 2-    Left Knee   Flexion: 2+  Extension: 2+    Right Knee   Flexion: 4-  Extension: 2+    Tests     Left Hip   Positive Ely's  Right Hip   Positive Ely's       Additional Tests Details  Hamstring Flexibility:  R: -37  L:  -62    Noble's:  R: -  L: +    Ambulation     Comments   TU' 26 39" with RW             Precautions: HTN, SOB, Hip Precautions        Manuals    PROM L Hip  10'    10' 10' 10'   MFR TO L ITB                                            Neuro Re-Ed              Rhomberg Stance              Sharpened Rhomberg 30" hd  3x    3x 3x 3x   Tandem Stance 30" hd  3x    2x 2x 3x   Weight Shifts       30x D/C                                                  Ther Ex              Nu Step L5 10'  L5 10'  L3 8 min  L5 10 min  L5 10'   TM              Standing hip 3 way  20    15x bilat 20 bilat  20x bilat   Supine Hip Flexor Stretch 1 5'    1' 30"   1'x2 bilat   Supine Hamstring Stretch 30" hold 3x    3x 3x 3x bilat   Marching 20x    20x 20x 20x   Mini Squats 15x      10x 15x   Monster Walks 6x 10'    4x 10 ft  6x 10 ft 6x 10 ft   Side Steps 6x 10'    4x 10 ft 6x 10 ft 6x 10 ft   Step Ups 15x 4"        4" 15x                                 Ther Activity                                            Gait Training                     10' 5'                    Modalities

## 2022-07-12 ENCOUNTER — OFFICE VISIT (OUTPATIENT)
Dept: PHYSICAL THERAPY | Facility: CLINIC | Age: 82
End: 2022-07-12
Payer: COMMERCIAL

## 2022-07-12 DIAGNOSIS — M16.12 UNILATERAL PRIMARY OSTEOARTHRITIS, LEFT HIP: Primary | ICD-10-CM

## 2022-07-12 DIAGNOSIS — M25.552 LEFT HIP PAIN: ICD-10-CM

## 2022-07-12 DIAGNOSIS — Z47.1 AFTERCARE FOLLOWING LEFT HIP JOINT REPLACEMENT SURGERY: ICD-10-CM

## 2022-07-12 DIAGNOSIS — Z96.642 AFTERCARE FOLLOWING LEFT HIP JOINT REPLACEMENT SURGERY: ICD-10-CM

## 2022-07-12 PROCEDURE — 97110 THERAPEUTIC EXERCISES: CPT | Performed by: PHYSICAL THERAPIST

## 2022-07-12 PROCEDURE — 97112 NEUROMUSCULAR REEDUCATION: CPT | Performed by: PHYSICAL THERAPIST

## 2022-07-12 PROCEDURE — 97140 MANUAL THERAPY 1/> REGIONS: CPT | Performed by: PHYSICAL THERAPIST

## 2022-07-12 NOTE — PROGRESS NOTES
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  Z47 1     [id number] [id number]    3  Left hip pain  M25 552                   Subjective: Pt reports her Left foot swelled last Thursday evening and continues to swell with a lot of activity      Objective: See treatment diary below      Assessment: Tolerated treatment well   Patient reports slight discomfort with side stepping      Plan: Increase step up height to 6 inches     Precautions: HTN, SOB, Hip Precautions        Manuals    PROM L Hip  10'   10'  10' 10'   MFR TO L ITB                                         Neuro Re-Ed             Rhomberg Stance             Sharpened Rhomberg 30" hd  3x   Foam 3x  3x 3x   Tandem Stance 30" hd  3x   3x  2x 3x   Weight Shifts        D/C                                               Ther Ex             Nu Step L5 10'  L5 10' L5 10 min  L5 10 min  L5 10'   TM             Standing hip 3 way  20   20x Bilat  20 bilat  20x bilat   Supine Hip Flexor Stretch 1 5'   1 5'    1'x2 bilat   Supine Hamstring Stretch 30" hold 3x   3x  3x 3x bilat   Marching 20x   20x  20x 20x   Mini Squats 15x   20x  10x 15x   Monster Walks 6x 10'   6x 10'  6x 10 ft 6x 10 ft   Side Steps 6x 10'   6x 10'  6x 10 ft 6x 10 ft   Step Ups 15x 4"   4" x 15 ea 6"   4" 15x                               Ther Activity                                         Gait Training                     5'                   Modalities

## 2022-07-14 ENCOUNTER — OFFICE VISIT (OUTPATIENT)
Dept: PHYSICAL THERAPY | Facility: CLINIC | Age: 82
End: 2022-07-14
Payer: COMMERCIAL

## 2022-07-14 DIAGNOSIS — M16.12 UNILATERAL PRIMARY OSTEOARTHRITIS, LEFT HIP: Primary | ICD-10-CM

## 2022-07-14 DIAGNOSIS — Z96.642 AFTERCARE FOLLOWING LEFT HIP JOINT REPLACEMENT SURGERY: ICD-10-CM

## 2022-07-14 DIAGNOSIS — Z47.1 AFTERCARE FOLLOWING LEFT HIP JOINT REPLACEMENT SURGERY: ICD-10-CM

## 2022-07-14 DIAGNOSIS — M25.552 LEFT HIP PAIN: ICD-10-CM

## 2022-07-14 PROCEDURE — 97110 THERAPEUTIC EXERCISES: CPT

## 2022-07-14 PROCEDURE — 97140 MANUAL THERAPY 1/> REGIONS: CPT

## 2022-07-14 NOTE — PROGRESS NOTES
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  Z47 1     [id number]                   Subjective: Patient reports L hip is not too sore, states "only maybe a /10 " However, the entire L LE is sore and tired as she has been doing a lot recently  Objective: See treatment diary below      Assessment: Tolerated treatment well with no significant increase in L hip pain  Moderate fatigue post session  Patient would benefit from continued PT to increase L hip strength and ROM for improved function in ADLs  Plan: Continue per plan of care        Precautions: HTN, SOB, Hip Precautions        Manuals    PROM L Hip  10'   10' 10'  10'   MFR TO L ITB                                      Neuro Re-Ed            Rhomberg Stance            Sharpened Rhomberg 30" hd  3x   Foam 3x Foam 3x  3x   Tandem Stance 30" hd  3x   3x 3x  3x   Weight Shifts                                                    Ther Ex            Nu Step L5 10'  L5 10' L5 10 min L5 10 min  L5 10'   TM            Standing hip 3 way  20   20x Bilat 20x Bilat  20x bilat   Supine Hip Flexor Stretch 1 5'   1 5' 1 5'  1'x2 bilat   Supine Hamstring Stretch 30" hold 3x   3x 3x  3x bilat   Marching 20x   20x 30x  20x   Mini Squats 15x   20x 20x  15x   Monster Walks 6x 10'   6x 10' 6x 10'  6x 10 ft   Side Steps 6x 10'   6x 10' 6x 10'  6x 10 ft   Step Ups 15x 4"   4" x 15 ea 6" 15x ea  4" 15x                             Ther Activity                                      Gait Training                                       Modalities

## 2022-07-19 ENCOUNTER — APPOINTMENT (OUTPATIENT)
Dept: PHYSICAL THERAPY | Facility: CLINIC | Age: 82
End: 2022-07-19
Payer: COMMERCIAL

## 2022-07-21 ENCOUNTER — OFFICE VISIT (OUTPATIENT)
Dept: PHYSICAL THERAPY | Facility: CLINIC | Age: 82
End: 2022-07-21
Payer: COMMERCIAL

## 2022-07-21 DIAGNOSIS — Z96.642 AFTERCARE FOLLOWING LEFT HIP JOINT REPLACEMENT SURGERY: ICD-10-CM

## 2022-07-21 DIAGNOSIS — M16.12 UNILATERAL PRIMARY OSTEOARTHRITIS, LEFT HIP: Primary | ICD-10-CM

## 2022-07-21 DIAGNOSIS — Z47.1 AFTERCARE FOLLOWING LEFT HIP JOINT REPLACEMENT SURGERY: ICD-10-CM

## 2022-07-21 DIAGNOSIS — M25.552 LEFT HIP PAIN: ICD-10-CM

## 2022-07-21 PROCEDURE — 97110 THERAPEUTIC EXERCISES: CPT

## 2022-07-21 PROCEDURE — 97140 MANUAL THERAPY 1/> REGIONS: CPT

## 2022-07-21 NOTE — PROGRESS NOTES
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  Z47 1     [id number]                   Subjective: patient stated she only has pain when she is sitting and she is fine with standing and walking  Objective: See treatment diary below      Assessment: Patient able to progress with repetitions and resistance with various exercises  Good tolerance with progression with minimal cues required  Limited ROM and pain with hip abduction Patient continues to present with deficits with strength and ROM requiring continued skilled physical therapy      Plan: Continue per plan of care  Progress treatment as tolerated         Precautions: HTN, SOB, Hip Precautions        Manuals     PROM L Hip  10'   10' 10' 10'    MFR TO L ITB                                   Neuro Re-Ed           Rhomberg Stance           Sharpened Rhomberg 30" hd  3x   Foam 3x Foam 3x     Tandem Stance 30" hd  3x   3x 3x 3x  Foam     Weight Shifts                                                Ther Ex           Nu Step L5 10'  L5 10' L5 10 min L5 10 min L5 10 MIN     TM           Standing hip 3 way  20   20x Bilat 20x Bilat 15X  #1 5    Supine Hip Flexor Stretch 1 5'   1 5' 1 5' 1 5'    Supine Hamstring Stretch 30" hold 3x   3x 3x 3x    Marching 20x   20x 30x #1 5 20x    Hamstring curls      #1 5 `15x    Mini Squats 15x   20x 20x 20x    Monster Walks 6x 10'   6x 10' 6x 10' 6x 10'    Side Steps 6x 10'   6x 10' 6x 10' 6x 10'    Step Ups 15x 4"   4" x 15 ea 6" 15x ea 6" step  15x   Fwd/lat                             Ther Activity                                   Gait Training                                    Modalities

## 2022-07-26 ENCOUNTER — OFFICE VISIT (OUTPATIENT)
Dept: PHYSICAL THERAPY | Facility: CLINIC | Age: 82
End: 2022-07-26
Payer: COMMERCIAL

## 2022-07-26 DIAGNOSIS — M25.552 LEFT HIP PAIN: ICD-10-CM

## 2022-07-26 DIAGNOSIS — Z47.1 AFTERCARE FOLLOWING LEFT HIP JOINT REPLACEMENT SURGERY: ICD-10-CM

## 2022-07-26 DIAGNOSIS — Z96.642 AFTERCARE FOLLOWING LEFT HIP JOINT REPLACEMENT SURGERY: ICD-10-CM

## 2022-07-26 DIAGNOSIS — M16.12 UNILATERAL PRIMARY OSTEOARTHRITIS, LEFT HIP: Primary | ICD-10-CM

## 2022-07-26 PROCEDURE — 97110 THERAPEUTIC EXERCISES: CPT

## 2022-07-26 PROCEDURE — 97140 MANUAL THERAPY 1/> REGIONS: CPT

## 2022-07-26 NOTE — PROGRESS NOTES
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  Z47 1     [id number]                   Subjective: patient stated she has been walking around house without cane  Objective: See treatment diary below      Assessment: patient amb with short step length and absent heel strike on right needing close supervision and multiple cues while on treadmill  Tight hamstrings, hip adductors an hip flexors with manual stretch  Overall tolerated treatment well  Will look to work on improving gait in upcoming visits  Plan: Continue per plan of care  Progress treatment as tolerated         Precautions: HTN, SOB, Hip Precautions        Manuals    PROM L Hip  10'   10' 10' 10' 10'   MFR TO L ITB                                   Neuro Re-Ed           Rhomberg Stance           Sharpened Rhomberg 30" hd  3x   Foam 3x Foam 3x     Tandem Stance 30" hd  3x   3x 3x 3x  Foam  3x  foam   Weight Shifts                                                Ther Ex           Nu Step L5 10'  L5 10' L5 10 min L5 10 min L4 10 MIN  L 5 min   TM        0 7 mpjh  3 min close supervision   Standing hip 3 way  20   20x Bilat 20x Bilat 15X  #1 5 15x  #1 5   Supine Hip Flexor Stretch 1 5'   1 5' 1 5' 1 5' 1 5 min   Supine Hamstring Stretch 30" hold 3x   3x 3x 3x 3x   Marching 20x   20x 30x #1 5 20x #1 5 20x   Hamstring curls      #1 5 `15x #1 5 20x   Mini Squats 15x   20x 20x 20x 20x   Monster Walks 6x 10'   6x 10' 6x 10' 6x 10' #1 5   6x 10 ft    Side Steps 6x 10'   6x 10' 6x 10' 6x 10' #1 5 6x 10 ft    Step Ups 15x 4"   4" x 15 ea 6" 15x ea 6" step  15x   Fwd/lat  6" step  15x  Fwd/ lat                            Ther Activity                                   Gait Training                                    Modalities

## 2022-07-28 ENCOUNTER — OFFICE VISIT (OUTPATIENT)
Dept: PHYSICAL THERAPY | Facility: CLINIC | Age: 82
End: 2022-07-28
Payer: COMMERCIAL

## 2022-07-28 DIAGNOSIS — M25.552 LEFT HIP PAIN: ICD-10-CM

## 2022-07-28 DIAGNOSIS — Z47.1 AFTERCARE FOLLOWING LEFT HIP JOINT REPLACEMENT SURGERY: ICD-10-CM

## 2022-07-28 DIAGNOSIS — Z96.642 AFTERCARE FOLLOWING LEFT HIP JOINT REPLACEMENT SURGERY: ICD-10-CM

## 2022-07-28 DIAGNOSIS — M16.12 UNILATERAL PRIMARY OSTEOARTHRITIS, LEFT HIP: Primary | ICD-10-CM

## 2022-07-28 PROCEDURE — 97110 THERAPEUTIC EXERCISES: CPT

## 2022-07-28 PROCEDURE — 97112 NEUROMUSCULAR REEDUCATION: CPT

## 2022-07-28 NOTE — PROGRESS NOTES
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  Z47 1     [id number] [id number]                   Subjective: patient stated she has been working on walking       Objective: See treatment diary below      Assessment: patient showed improvement on treadmill with heel strike but has some difficulty with swing through phase  Good execution with exercise  No exacerbation of pain  Overall tolerated treatment well  Plan: Continue per plan of care  Progress treatment as tolerated         Precautions: HTN, SOB, Hip Precautions        Manuals    PROM L Hip  10'  10' 10' 10' 10'   MFR TO L ITB                                Neuro Re-Ed          Rhomberg Stance          Sharpened Rhomberg 30" hd    Foam 3x Foam 3x     Tandem Stance 30" hd  3x  Foam   3x 3x 3x  Foam  3x  foam   Weight Shifts                                            Ther Ex          Nu Step L5 5'  L5 10 min L5 10 min L4 10 MIN  L 5 min   TM  0 9 mph  4 min  Close supervision     0 7 mpjh  3 min close supervision   Standing hip 3 way  15x  #1 5  20x Bilat 20x Bilat 15X  #1 5 15x  #1 5   Supine Hip Flexor Stretch 1 5'  1 5' 1 5' 1 5' 1 5 min   Supine Hamstring Stretch 30" hold 3x  3x 3x 3x 3x   Marching #1 5 20x  20x 30x #1 5 20x #1 5 20x   Hamstring curls  #1 5 20x    #1 5 `15x #1 5 20x   Mini Squats 20x  20x 20x 20x 20x   Monster Walks #1 5  6x 10'  6x 10' 6x 10' 6x 10' #1 5   6x 10 ft    Side Steps #1 5  6x 10'  6x 10' 6x 10' 6x 10' #1 5 6x 10 ft    Step Ups 6" step   15x  Fwd/lat  4" x 15 ea 6" 15x ea 6" step  15x   Fwd/lat  6" step  15x  Fwd/ lat                          Ther Activity                                Gait Training                              Modalities

## 2022-08-02 ENCOUNTER — OFFICE VISIT (OUTPATIENT)
Dept: PHYSICAL THERAPY | Facility: CLINIC | Age: 82
End: 2022-08-02
Payer: COMMERCIAL

## 2022-08-02 DIAGNOSIS — M25.552 LEFT HIP PAIN: ICD-10-CM

## 2022-08-02 DIAGNOSIS — Z47.1 AFTERCARE FOLLOWING LEFT HIP JOINT REPLACEMENT SURGERY: ICD-10-CM

## 2022-08-02 DIAGNOSIS — Z96.642 AFTERCARE FOLLOWING LEFT HIP JOINT REPLACEMENT SURGERY: ICD-10-CM

## 2022-08-02 DIAGNOSIS — M16.12 UNILATERAL PRIMARY OSTEOARTHRITIS, LEFT HIP: Primary | ICD-10-CM

## 2022-08-02 PROCEDURE — 97112 NEUROMUSCULAR REEDUCATION: CPT | Performed by: PHYSICAL THERAPIST

## 2022-08-02 PROCEDURE — 97140 MANUAL THERAPY 1/> REGIONS: CPT | Performed by: PHYSICAL THERAPIST

## 2022-08-02 NOTE — PROGRESS NOTES
PT Re-Evaluation     Today's date: 2022  Patient name: Néstor Tai  : 1940  MRN: [id number]  Referring provider: Reine Gitelman, MD  Dx:   Encounter Diagnosis     ICD-10-CM    1  Unilateral primary osteoarthritis, left hip  M16 12    2  Left hip pain  M25 552    3  Aftercare following left hip joint replacement surgery  Z47 1     [id number] [id number]                   Assessment  Assessment details: The patient has been seen in PT for a total of visits since [facility] with good PT attendance noted  She has complaints of pain  She demonstrates deficits with decreased ROM and strength and ambulatory dysfunction with lead to difficulty performing ADL's, ambulation  The patient would benefit from continued PT to address deficits and improve function  Tx to include ROM, stretching, strengthening, modalities, HEP, pt education, postural ed, lifting/body mechanics, neuro re-ed, balance/proprioception Te, MT and equipment  Néstor Tai is a [de-identified] y o  female who presents with pain, decreased strength, decreased ROM and ambulatory dysfunction  Due to these impairments, patient has difficulty performing ADL's, ambulation  Patient demonstrates improving strength, ROM, and flexibility  Patient's clinical presentation is consistent with their referring diagnosis of Unilateral primary osteoarthritis, left hip  (primary encounter diagnosis)    Left hip pain    Aftercare following left hip joint replacement surgery    Patient has been educated in home exercise program and plan of care   Patient would benefit from skilled physical therapy services to address their aforementioned functional limitations and progress towards prior level of function and independence with home exercise program      Impairments: abnormal muscle firing, abnormal or restricted ROM, activity intolerance, impaired physical strength, lacks appropriate home exercise program and pain with function  Understanding of Dx/Px/POC: good   Prognosis: good    Goals  Short Term Goals:    1  Initiate and advance HEP - Partially MET  2  AROM Left Hip flexion 90 degrees - Partially MET  3  AROM Left knee ext 0 degrees - Partially MET    Long Term Goals:    1  Indep with HEP  2  Walk - Partially MET  3  Sit to stand - MET    Plan  Plan details: Continue to improve strength, ROM, and function  Patient would benefit from: skilled PT  Planned modality interventions: cryotherapy, electrical stimulation/Russian stimulation and thermotherapy: hydrocollator packs  Planned therapy interventions: joint mobilization, manual therapy, patient education, postural training, activity modification, abdominal trunk stabilization, body mechanics training, flexibility, functional ROM exercises, graded exercise, home exercise program, neuromuscular re-education, strengthening, stretching, therapeutic activities, therapeutic exercise, motor coordination training, muscle pump exercises, gait training, balance/weight bearing training and ADL training  Frequency: 2x week  Duration in weeks: 6  Plan of Care beginning date: 2022  Plan of Care expiration date: 2022  Treatment plan discussed with: patient        Subjective Evaluation    History of Present Illness  Mechanism of injury: Pt reports Left hip pain began gradually in   Pt with L THR on 6/3/2022    Quality of life: good    Pain  Current pain ratin  At best pain ratin  At worst pain ratin  Quality: tight  Relieving factors: ice and medications  Aggravating factors: sitting  Progression: improved    Treatments  Previous treatment: physical therapy  Current treatment: medication and physical therapy  Patient Goals  Patient goals for therapy: decreased edema, decreased pain, increased motion, increased strength and independence with ADLs/IADLs          Objective     Active Range of Motion   Left Hip   Flexion: 62 degrees     Right Hip   Flexion: 90 degrees   External rotation (90/90): 22 degrees   Internal rotation (90/90): 8 degrees   Left Knee   Flexion: 111 degrees   Extensor la degrees     Right Knee   Flexion: 129 degrees   Extensor lag: 15 degrees     Strength/Myotome Testing     Left Hip   Planes of Motion   Flexion: 2    Right Hip   Planes of Motion   Flexion: 4-  External rotation: 4-  Internal rotation: 2-    Left Knee   Flexion: 2+  Extension: 2+    Right Knee   Flexion: 4-  Extension: 2+    Tests     Left Hip   Positive Ely's  Right Hip   Positive Ely's       Additional Tests Details  Hamstring Flexibility:  R: -37  L:  -62    Noble's:  R: -  L: +    Ambulation     Comments   TU' 26 39" with RW

## 2022-08-02 NOTE — PROGRESS NOTES
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  Z47 1     [id number] [id number]                   Subjective: Pt reports feeling fatigued today secondary to spending the night in the ER with her       Objective: See treatment diary below      Assessment: Tolerated treatment well  Patient would benefit from continued PT      Plan: Progress treatment as tolerated         Precautions: HTN, SOB, Hip Precautions        Manuals    PROM L Hip  10' 10' 10' 10' 10' 10'   MFR TO L ITB                                Neuro Re-Ed          Rhomberg Stance          Sharpened Rhomberg 30" hd    Foam 3x Foam 3x     Tandem Stance 30" hd  3x  Foam  3x foam 3x 3x 3x  Foam  3x  foam   Weight Shifts                                            Ther Ex          Nu Step L5 5' L5 10min L5 10 min L5 10 min L4 10 MIN  L 5 min   TM  0 9 mph  4 min  Close supervision 0 9 5 min    0 7 mpjh  3 min close supervision   Standing hip 3 way  15x  #1 5 15x  #1 5 20x Bilat 20x Bilat 15X  #1 5 15x  #1 5   Supine Hip Flexor Stretch 1 5' 1 5' 1 5' 1 5' 1 5' 1 5 min   Supine Hamstring Stretch 30" hold 3x 3x 3x 3x 3x 3x   Marching #1 5 20x 1 5# 20x 20x 30x #1 5 20x #1 5 20x   Hamstring curls  #1 5 20x 20x  !1 5   #1 5 `15x #1 5 20x   Mini Squats 20x 20x 20x 20x 20x 20x   Monster Walks #1 5  6x 10' 6x 10ft  #1 5 6x 10' 6x 10' 6x 10' #1 5   6x 10 ft    Side Steps #1 5  6x 10' 6x 10ft  #1 5 6x 10' 6x 10' 6x 10' #1 5 6x 10 ft    Step Ups 6" step   15x  Fwd/lat 6" step   15x  Fwd/lat 4" x 15 ea 6" 15x ea 6" step  15x   Fwd/lat  6" step  15x  Fwd/ lat                          Ther Activity                                Gait Training                              Modalities

## 2022-08-04 ENCOUNTER — OFFICE VISIT (OUTPATIENT)
Dept: PHYSICAL THERAPY | Facility: CLINIC | Age: 82
End: 2022-08-04
Payer: COMMERCIAL

## 2022-08-04 DIAGNOSIS — Z96.642 AFTERCARE FOLLOWING LEFT HIP JOINT REPLACEMENT SURGERY: ICD-10-CM

## 2022-08-04 DIAGNOSIS — M25.552 LEFT HIP PAIN: ICD-10-CM

## 2022-08-04 DIAGNOSIS — Z47.1 AFTERCARE FOLLOWING LEFT HIP JOINT REPLACEMENT SURGERY: ICD-10-CM

## 2022-08-04 DIAGNOSIS — M16.12 UNILATERAL PRIMARY OSTEOARTHRITIS, LEFT HIP: Primary | ICD-10-CM

## 2022-08-04 PROCEDURE — 97110 THERAPEUTIC EXERCISES: CPT

## 2022-08-04 PROCEDURE — 97112 NEUROMUSCULAR REEDUCATION: CPT

## 2022-08-04 PROCEDURE — 97140 MANUAL THERAPY 1/> REGIONS: CPT

## 2022-08-04 NOTE — PROGRESS NOTES
Daily Note     Today's date: 2022  Patient name: Zhou Alonso  : 1940  MRN: [id number]  Referring provider: Sharita Sanford MD  Dx:   Encounter Diagnosis     ICD-10-CM    1  Unilateral primary osteoarthritis, left hip  M16 12    2  Left hip pain  M25 552    3  Aftercare following left hip joint replacement surgery  Z47 1     [id number] [id number]                   Subjective: patient stated she need a quick session today because she has to  her  from hospital        Objective: See treatment diary below      Assessment: Modified treatment due to aforementioned subjective comment  Will continue to monitor pain levels and level of endurance and adjust program as needed in upcoming visits  Patient would benefit from continued therapy to decrease pain and increase strength and flexibility to improve LOF      Plan: Continue per plan of care  Progress treatment as tolerated         Precautions: HTN, SOB, Hip Precautions        Manuals    PROM L Hip  10' 10' 10'  10' 10'   MFR TO L ITB                                Neuro Re-Ed          Rhomberg Stance          Sharpened Rhomberg 30" hd          Tandem Stance 30" hd  3x  Foam  3x foam Hold x1 visit  3x  Foam    3x  foam   Weight Shifts                                            Ther Ex          Nu Step L5 5' L5 10min L5 10 min  L4 10 MIN  L 5 min   TM  0 9 mph  4 min  Close supervision 0 9 5 min Held x1 visit    0 7 mpjh  3 min close supervision   Standing hip 3 way  15x  #1 5 15x  #1 5 15x   #`1 5   bilat   15X  #1 5 15x  #1 5   Supine Hip Flexor Stretch 1 5' 1 5' 1 5'  1 5' 1 5 min   Supine Hamstring Stretch 30" hold 3x 3x 3x  3x 3x   Marching #1 5 20x 1 5# 20x 20x  #1 5 20x #1 5 20x   Hamstring curls  #1 5 20x 20x  !1 5 20x  #1 5  #1 5 `15x #1 5 20x   Mini Squats 20x 20x 20x  20x 20x   Monster Walks #1 5  6x 10' 6x 10ft  #1 5 6x 10'  6x 10' #1 5   6x 10 ft    Side Steps #1 5  6x 10' 6x 10ft  #1 5 6x 10'  6x 10' #1 5 6x 10 ft    Step Ups 6" step   15x  Fwd/lat 6" step   15x  Fwd/lat 6" step 15 ea  Fwd/lat  6" step  15x   Fwd/lat  6" step  15x  Fwd/ lat                          Ther Activity                                Gait Training                                 Modalities

## 2022-08-08 NOTE — PROGRESS NOTES
PT Re-Evaluation     Today's date: 8/10/2022  Patient name: Anna Landeros  : 1940  MRN: [id number]  Referring provider: Kay Garcia MD  Dx:   Encounter Diagnosis     ICD-10-CM    1  Unilateral primary osteoarthritis, left hip  M16 12    2  Left hip pain  M25 552    3  Aftercare following left hip joint replacement surgery  Z47 1     [id number]                   Assessment  Assessment details: The patient has been seen in PT for a total of 16 visits since [facility] with good PT attendance noted  She has complaints of intermittent pain in her hip  She demonstrates deficits with decreased ROM and strength and ambulatory dysfunction with lead to difficulty performing ADL's, ambulation  She now ambulates household distances without AD, uses cane for community distances  The patient would benefit from continued PT to address deficits and improve function  Tx to include ROM, stretching, strengthening, modalities, HEP, pt education, postural ed, lifting/body mechanics, neuro re-ed, balance/proprioception Te, MT and equipment  Plan to continue with PT and will progress as able in upcoming visits with ROM, stretching, strengthening, balance, flexibility and HEP  Impairments: abnormal muscle firing, abnormal or restricted ROM, activity intolerance, impaired physical strength, lacks appropriate home exercise program and pain with function  Understanding of Dx/Px/POC: good   Prognosis: good    Goals  Short Term Goals:    1  Initiate and advance HEP - Partially MET  2  AROM Left Hip flexion 90 degrees - Partially MET  3  AROM Left knee ext 0 degrees - MET    Long Term Goals:    1  Indep with HEP  2  Walk - Partially MET  3   Sit to stand - MET    Plan  Plan details: Continue to improve strength, ROM, and function  Patient would benefit from: skilled PT  Planned modality interventions: cryotherapy, electrical stimulation/Russian stimulation and thermotherapy: hydrocollator packs  Planned therapy interventions: joint mobilization, manual therapy, patient education, postural training, activity modification, abdominal trunk stabilization, body mechanics training, flexibility, functional ROM exercises, graded exercise, home exercise program, neuromuscular re-education, strengthening, stretching, therapeutic activities, therapeutic exercise, motor coordination training, muscle pump exercises, gait training, balance/weight bearing training and ADL training  Frequency: 2x week  Duration in weeks: 6  Plan of Care beginning date: 8/10/2022  Plan of Care expiration date: 2022  Treatment plan discussed with: patient        Subjective Evaluation    History of Present Illness  Mechanism of injury: Pt reports Left hip pain began gradually in   Pt with L THR on 6/3/2022  UPDATE 8/10/22: The patient states that she has been doing better  Notes that she has more flexibility and her leg feels stronger  Notes that she still has difficulty with putting on her shoes - uses a shoe horn  States that she has been sleeping better and able to lay on her L side for short periods of time  She will be seeing the doctor on 22 for her follow up appointment      Quality of life: good    Pain  At best pain ratin  At worst pain ratin  Quality: tight  Relieving factors: ice  Aggravating factors: sitting  Progression: improved    Treatments  Previous treatment: physical therapy  Current treatment: medication and physical therapy  Patient Goals  Patient goals for therapy: decreased edema, decreased pain, increased motion, increased strength and independence with ADLs/IADLs          Objective     Active Range of Motion   Left Hip   Flexion: 86 degrees     Right Hip   Flexion: 90 degrees   External rotation (90/90): 22 degrees   Internal rotation (90/90): 8 degrees   Left Knee   Flexion: 125 degrees     Right Knee   Flexion: 129 degrees     Strength/Myotome Testing     Left Hip   Planes of Motion   Flexion: 3+    Right Hip   Planes of Motion   Flexion: 4-  External rotation: 4-  Internal rotation: 2-    Left Knee   Flexion: 3+  Extension: 3+    Right Knee   Flexion: 4-  Extension: 3+    Tests     Left Hip   Positive Ely's  Right Hip   Positive Ely's       Additional Tests Details  Hamstring Flexibility:  R: -37  L:  -58    Noble's:  R: -  L: +    Ambulation     Comments   TU' 26 39" with RW  TUG 8/10: 58" without AD             Precautions: HTN, SOB, Hip Precautions        Manuals 7/28 8/2 8/4 8/10 7/21 7/26   PROM L Hip  10' 10' 10' 10' 10' 10'   MFR TO L ITB                                Neuro Re-Ed          Rhomberg Stance          Sharpened Rhomberg 30" hd          Tandem Stance 30" hd  3x  Foam  3x foam Hold x1 visit 3x foam 3x  Foam    3x  foam   Weight Shifts                                            Ther Ex          Nu Step L5 5' L5 10min L5 10 min L5 10 min L4 10 MIN  L 5 min   TM  0 9 mph  4 min  Close supervision 0 9 5 min Held x1 visit  NP resume NV  0 7 mph  3 min close supervision   Standing hip 3 way  15x  #1 5 15x  #1 5 15x   #1 5   bilat  15x  1 5# Ajith 15X  #1 5 15x  #1 5   Supine Hip Flexor Stretch 1 5' 1 5' 1 5' 1 5' 1 5' 1 5 min   Supine Hamstring Stretch 30" hold 3x 3x 3x 3x 3x 3x   Marching #1 5 20x 1 5# 20x 20x 20x 1 5# #1 5 20x #1 5 20x   Hamstring curls  #1 5 20x 20x  !1 5 20x  #1 5 20x  1 5# #1 5 15x #1 5 20x   Mini Squats 20x 20x 20x 20x 20x 20x   Monster Walks #1 5  6x 10' 6x 10ft  #1 5 6x 10' 6 x 10'  1 5# 6x 10' #1 5   6x 10 ft    Side Steps #1 5  6x 10' 6x 10ft  #1 5 6x 10' 6 x 10'  1 5# 6x 10' #1 5 6x 10 ft    Step Ups 6" step   15x  Fwd/lat 6" step   15x  Fwd/lat 6" step 15 ea  Fwd/lat 6" step 15x ea  For/Lat 6" step  15x   Fwd/lat  6" step  15x  Fwd/ lat                          Ther Activity                                Gait Training                                 Modalities

## 2022-08-09 ENCOUNTER — APPOINTMENT (OUTPATIENT)
Dept: PHYSICAL THERAPY | Facility: CLINIC | Age: 82
End: 2022-08-09
Payer: COMMERCIAL

## 2022-08-10 ENCOUNTER — EVALUATION (OUTPATIENT)
Dept: PHYSICAL THERAPY | Facility: CLINIC | Age: 82
End: 2022-08-10
Payer: COMMERCIAL

## 2022-08-10 DIAGNOSIS — Z96.642 AFTERCARE FOLLOWING LEFT HIP JOINT REPLACEMENT SURGERY: ICD-10-CM

## 2022-08-10 DIAGNOSIS — M16.12 UNILATERAL PRIMARY OSTEOARTHRITIS, LEFT HIP: Primary | ICD-10-CM

## 2022-08-10 DIAGNOSIS — Z47.1 AFTERCARE FOLLOWING LEFT HIP JOINT REPLACEMENT SURGERY: ICD-10-CM

## 2022-08-10 DIAGNOSIS — M25.552 LEFT HIP PAIN: ICD-10-CM

## 2022-08-10 PROCEDURE — 97110 THERAPEUTIC EXERCISES: CPT | Performed by: PHYSICAL THERAPIST

## 2022-08-10 PROCEDURE — 97112 NEUROMUSCULAR REEDUCATION: CPT | Performed by: PHYSICAL THERAPIST

## 2022-08-10 PROCEDURE — 97140 MANUAL THERAPY 1/> REGIONS: CPT | Performed by: PHYSICAL THERAPIST

## 2022-08-10 NOTE — LETTER
2022    MD Ginna Rosales [phone]    Patient: Edwin Dawson   YOB: 1940   Date of Visit: 8/10/2022     Encounter Diagnosis     ICD-10-CM    1  Unilateral primary osteoarthritis, left hip  M16 12    2  Left hip pain  M25 552    3  Aftercare following left hip joint replacement surgery  Z47 1     [id number]        Dear Dr Omkar Streeter: Thank you for your recent referral of Edwin Dawson  Please review the attached evaluation summary from [address] [place] [address] recent visit  Please verify that you agree with the plan of care by signing the attached order  If you have any questions or concerns, please do not hesitate to call  I sincerely appreciate the opportunity to share in the care of one of your patients and hope to have another opportunity to work with you in the near future  Sincerely,    Hanny Coleman, PT      Referring Provider:      I certify that I have read the below Plan of Care and certify the need for these services furnished under this plan of treatment while under my care  MD Ginna Rosales [phone]  Via Fax: [phone]          PT Re-Evaluation     Today's date: 8/10/2022  Patient name: Edwin Dawson  : 1940  MRN: [id number]  Referring provider: Kevin Gonsales MD  Dx:   Encounter Diagnosis     ICD-10-CM    1  Unilateral primary osteoarthritis, left hip  M16 12    2  Left hip pain  M25 552    3  Aftercare following left hip joint replacement surgery  Z47 1     [id number]                   Assessment  Assessment details: The patient has been seen in PT for a total of 16 visits since [facility] with good PT attendance noted  She has complaints of intermittent pain in her hip  She demonstrates deficits with decreased ROM and strength and ambulatory dysfunction with lead to difficulty performing ADL's, ambulation    She now ambulates household distances without AD, uses cane for community distances  The patient would benefit from continued PT to address deficits and improve function  Tx to include ROM, stretching, strengthening, modalities, HEP, pt education, postural ed, lifting/body mechanics, neuro re-ed, balance/proprioception Te, MT and equipment  Plan to continue with PT and will progress as able in upcoming visits with ROM, stretching, strengthening, balance, flexibility and HEP  Impairments: abnormal muscle firing, abnormal or restricted ROM, activity intolerance, impaired physical strength, lacks appropriate home exercise program and pain with function  Understanding of Dx/Px/POC: good   Prognosis: good    Goals  Short Term Goals:    1  Initiate and advance HEP - Partially MET  2  AROM Left Hip flexion 90 degrees - Partially MET  3  AROM Left knee ext 0 degrees - MET    Long Term Goals:    1  Indep with HEP  2  Walk - Partially MET  3  Sit to stand - MET    Plan  Plan details: Continue to improve strength, ROM, and function  Patient would benefit from: skilled PT  Planned modality interventions: cryotherapy, electrical stimulation/Russian stimulation and thermotherapy: hydrocollator packs  Planned therapy interventions: joint mobilization, manual therapy, patient education, postural training, activity modification, abdominal trunk stabilization, body mechanics training, flexibility, functional ROM exercises, graded exercise, home exercise program, neuromuscular re-education, strengthening, stretching, therapeutic activities, therapeutic exercise, motor coordination training, muscle pump exercises, gait training, balance/weight bearing training and ADL training  Frequency: 2x week  Duration in weeks: 6  Plan of Care beginning date: 8/10/2022  Plan of Care expiration date: 9/21/2022  Treatment plan discussed with: patient        Subjective Evaluation    History of Present Illness  Mechanism of injury: Pt reports Left hip pain began gradually in 2020   Pt with L THR on 6/3/2022  UPDATE 8/10/22: The patient states that she has been doing better  Notes that she has more flexibility and her leg feels stronger  Notes that she still has difficulty with putting on her shoes - uses a shoe horn  States that she has been sleeping better and able to lay on her L side for short periods of time  She will be seeing the doctor on 22 for her follow up appointment  Quality of life: good    Pain  At best pain ratin  At worst pain ratin  Quality: tight  Relieving factors: ice  Aggravating factors: sitting  Progression: improved    Treatments  Previous treatment: physical therapy  Current treatment: medication and physical therapy  Patient Goals  Patient goals for therapy: decreased edema, decreased pain, increased motion, increased strength and independence with ADLs/IADLs          Objective     Active Range of Motion   Left Hip   Flexion: 86 degrees     Right Hip   Flexion: 90 degrees   External rotation (90/90): 22 degrees   Internal rotation (90/90): 8 degrees   Left Knee   Flexion: 125 degrees     Right Knee   Flexion: 129 degrees     Strength/Myotome Testing     Left Hip   Planes of Motion   Flexion: 3+    Right Hip   Planes of Motion   Flexion: 4-  External rotation: 4-  Internal rotation: 2-    Left Knee   Flexion: 3+  Extension: 3+    Right Knee   Flexion: 4-  Extension: 3+    Tests     Left Hip   Positive Ely's  Right Hip   Positive Ely's       Additional Tests Details  Hamstring Flexibility:  R: -37  L:  -62    Noble's:  R: -  L: +    Ambulation     Comments   TU' 26 39" with RW  TUG 8/10: 58" without AD             Precautions: HTN, SOB, Hip Precautions        Manuals  8/2 8/4 8/10 7/21 7/26   PROM L Hip  10' 10' 10' 10' 10' 10'   MFR TO L ITB                                Neuro Re-Ed          Rhomberg Stance          Sharpened Rhomberg 30" hd          Tandem Stance 30" hd  3x  Foam  3x foam Hold x1 visit 3x foam 3x  Foam    3x  foam Weight Shifts                                            Ther Ex          Nu Step L5 5' L5 10min L5 10 min L5 10 min L4 10 MIN  L 5 min   TM  0 9 mph  4 min  Close supervision 0 9 5 min Held x1 visit  NP resume NV  0 7 mph  3 min close supervision   Standing hip 3 way  15x  #1 5 15x  #1 5 15x   #1 5   bilat  15x  1 5# Ajith 15X  #1 5 15x  #1 5   Supine Hip Flexor Stretch 1 5' 1 5' 1 5' 1 5' 1 5' 1 5 min   Supine Hamstring Stretch 30" hold 3x 3x 3x 3x 3x 3x   Marching #1 5 20x 1 5# 20x 20x 20x 1 5# #1 5 20x #1 5 20x   Hamstring curls  #1 5 20x 20x  !1 5 20x  #1 5 20x  1 5# #1 5 15x #1 5 20x   Mini Squats 20x 20x 20x 20x 20x 20x   Monster Walks #1 5  6x 10' 6x 10ft  #1 5 6x 10' 6 x 10'  1 5# 6x 10' #1 5   6x 10 ft    Side Steps #1 5  6x 10' 6x 10ft  #1 5 6x 10' 6 x 10'  1 5# 6x 10' #1 5 6x 10 ft    Step Ups 6" step   15x  Fwd/lat 6" step   15x  Fwd/lat 6" step 15 ea  Fwd/lat 6" step 15x ea  For/Lat 6" step  15x   Fwd/lat  6" step  15x  Fwd/ lat                          Ther Activity                                Gait Training                                 Modalities

## 2022-08-11 ENCOUNTER — OFFICE VISIT (OUTPATIENT)
Dept: PHYSICAL THERAPY | Facility: CLINIC | Age: 82
End: 2022-08-11
Payer: COMMERCIAL

## 2022-08-11 DIAGNOSIS — Z96.642 AFTERCARE FOLLOWING LEFT HIP JOINT REPLACEMENT SURGERY: ICD-10-CM

## 2022-08-11 DIAGNOSIS — M25.552 LEFT HIP PAIN: ICD-10-CM

## 2022-08-11 DIAGNOSIS — Z47.1 AFTERCARE FOLLOWING LEFT HIP JOINT REPLACEMENT SURGERY: ICD-10-CM

## 2022-08-11 DIAGNOSIS — M16.12 UNILATERAL PRIMARY OSTEOARTHRITIS, LEFT HIP: Primary | ICD-10-CM

## 2022-08-11 PROCEDURE — 97112 NEUROMUSCULAR REEDUCATION: CPT

## 2022-08-11 PROCEDURE — 97110 THERAPEUTIC EXERCISES: CPT

## 2022-08-11 PROCEDURE — 97140 MANUAL THERAPY 1/> REGIONS: CPT

## 2022-08-11 NOTE — PROGRESS NOTES
Daily Note     Today's date: 2022  Patient name: Christopher Romeo  : 1940  MRN: [id number]  Referring provider: Kishor Kennedy MD  Dx:   Encounter Diagnosis     ICD-10-CM    1  Unilateral primary osteoarthritis, left hip  M16 12    2  Left hip pain  M25 552    3  Aftercare following left hip joint replacement surgery  Z47 1     [id number]                   Subjective: Patient reports L hip is feeling alright at beginning of session and she denies significant L hip pain  Objective: See treatment diary below      Assessment: Tolerated treatment well with no reports of hip pain  Patient required some verbal cues to perfrom exercises appropriately  Good tolerance to PROM with moderate relief noted following  Patient would benefit from continued PT to increase L hip strength and mobility for improved function in ADLs  Plan: Continue per plan of care        Precautions: HTN, SOB, Hip Precautions        Manuals 7/28 8/2 8/4 8/10 8/11    PROM L Hip  10' 10' 10' 10' 10'    MFR TO L ITB                                Neuro Re-Ed          Rhomberg Stance          Sharpened Rhomberg 30" hd          Tandem Stance 30" hd  3x  Foam  3x foam Hold x1 visit 3x foam 3x foam    Weight Shifts                                            Ther Ex          Nu Step L5 5' L5 10min L5 10 min L5 10 min L5 10 min    TM  0 9 mph  4 min  Close supervision 0 9 5 min Held x1 visit  NP resume NV 0 9 5 min    Standing hip 3 way  15x  #1 5 15x  #1 5 15x   #1 5   bilat  15x  1 5# Ajith 15x  1 5# Ajith    Supine Hip Flexor Stretch 1 5' 1 5' 1 5' 1 5' 1 5' L    Supine Hamstring Stretch 30" hold 3x 3x 3x 3x 3x B/L    Marching #1 5 20x 1 5# 20x 20x 20x 1 5# 20x  1 5#    Hamstring curls  #1 5 20x 20x  !1 5 20x  #1 5 20x  1 5# 20x  1 5#    Mini Squats 20x 20x 20x 20x 20x    Monster Walks #1 5  6x 10' 6x 10ft  #1 5 6x 10' 6 x 10'  1 5# 6 x 10'  1 5#    Side Steps #1 5  6x 10' 6x 10ft  #1 5 6x 10' 6 x 10'  1 5# 6 x 10'  1 5#    Step Ups 6" step 15x  Fwd/lat 6" step   15x  Fwd/lat 6" step 15 ea  Fwd/lat 6" step 15x ea  For/Lat 6" step 15x ea  For/Lat                          Ther Activity                                Gait Training                                 Modalities

## 2022-08-16 ENCOUNTER — APPOINTMENT (OUTPATIENT)
Dept: PHYSICAL THERAPY | Facility: CLINIC | Age: 82
End: 2022-08-16
Payer: COMMERCIAL

## 2022-08-18 ENCOUNTER — OFFICE VISIT (OUTPATIENT)
Dept: PHYSICAL THERAPY | Facility: CLINIC | Age: 82
End: 2022-08-18
Payer: COMMERCIAL

## 2022-08-18 DIAGNOSIS — Z96.642 AFTERCARE FOLLOWING LEFT HIP JOINT REPLACEMENT SURGERY: ICD-10-CM

## 2022-08-18 DIAGNOSIS — M16.12 UNILATERAL PRIMARY OSTEOARTHRITIS, LEFT HIP: Primary | ICD-10-CM

## 2022-08-18 DIAGNOSIS — M25.552 LEFT HIP PAIN: ICD-10-CM

## 2022-08-18 DIAGNOSIS — Z47.1 AFTERCARE FOLLOWING LEFT HIP JOINT REPLACEMENT SURGERY: ICD-10-CM

## 2022-08-18 PROCEDURE — 97140 MANUAL THERAPY 1/> REGIONS: CPT

## 2022-08-18 PROCEDURE — 97110 THERAPEUTIC EXERCISES: CPT

## 2022-08-18 PROCEDURE — 97112 NEUROMUSCULAR REEDUCATION: CPT

## 2022-08-18 NOTE — PROGRESS NOTES
Daily Note     Today's date: 2022  Patient name: Cyndy King  : 1940  MRN: [id number]  Referring provider: Sloan Warner MD  Dx:   Encounter Diagnosis     ICD-10-CM    1  Unilateral primary osteoarthritis, left hip  M16 12    2  Left hip pain  M25 552    3  Aftercare following left hip joint replacement surgery  Z47 1     [id number] [id number]                   Subjective: patient offered no complaints       Objective: See treatment diary below      Assessment: patient gait has improved but still absent heel strike amb more flat foot  Patient able to progress with repetitions and resistance with various exercises  Good tolerance with progression with minimal cues required  Patient continues to present with deficits with strength and ROM requiring continued skilled physical therapy      Plan: Continue per plan of care  Progress treatment as tolerated         Precautions: HTN, SOB, Hip Precautions        Manuals 7/28 8/2 8/4 8/10 8/11 8/18   PROM L Hip  10' 10' 10' 10' 10' 10'   MFR TO L ITB                                Neuro Re-Ed          Rhomberg Stance          Sharpened Rhomberg 30" hd          Tandem Stance 30" hd  3x  Foam  3x foam Hold x1 visit 3x foam 3x foam 3x foam   Weight Shifts                                            Ther Ex          Nu Step L5 5' L5 10min L5 10 min L5 10 min L5 10 min L5 10 min    TM  0 9 mph  4 min  Close supervision 0 9 5 min Held x1 visit  NP resume NV 0 9 5 min 1 0 mph  5 min    Standing hip 3 way  15x  #1 5 15x  #1 5 15x   #1 5   bilat  15x  1 5# Ajith 15x  1 5# Ajith 15x  #2    Supine Hip Flexor Stretch 1 5' 1 5' 1 5' 1 5' 1 5' L    Supine Hamstring Stretch 30" hold 3x 3x 3x 3x 3x B/L    Marching #1 5 20x 1 5# 20x 20x 20x 1 5# 20x  1 5# 15x  #2   Hamstring curls  #1 5 20x 20x  !1 5 20x  #1 5 20x  1 5# 20x  1 5# 15x  #2   Mini Squats 20x 20x 20x 20x 20x    Monster Walks #1 5  6x 10' 6x 10ft  #1 5 6x 10' 6 x 10'  1 5# 6 x 10'  1 5# 6x 10'  #2   Side Steps #1 5  6x 10' 6x 10ft  #1 5 6x 10' 6 x 10'  1 5# 6 x 10'  1 5# 6x 10'  #2   Step Ups 6" step   15x  Fwd/lat 6" step   15x  Fwd/lat 6" step 15 ea  Fwd/lat 6" step 15x ea  For/Lat 6" step 15x ea  For/Lat                          Ther Activity                                Gait Training                                 Modalities

## 2022-08-23 ENCOUNTER — APPOINTMENT (OUTPATIENT)
Dept: PHYSICAL THERAPY | Facility: CLINIC | Age: 82
End: 2022-08-23
Payer: COMMERCIAL

## 2022-08-25 ENCOUNTER — OFFICE VISIT (OUTPATIENT)
Dept: PHYSICAL THERAPY | Facility: CLINIC | Age: 82
End: 2022-08-25
Payer: COMMERCIAL

## 2022-08-25 DIAGNOSIS — M16.12 UNILATERAL PRIMARY OSTEOARTHRITIS, LEFT HIP: Primary | ICD-10-CM

## 2022-08-25 DIAGNOSIS — M25.552 LEFT HIP PAIN: ICD-10-CM

## 2022-08-25 DIAGNOSIS — Z47.1 AFTERCARE FOLLOWING LEFT HIP JOINT REPLACEMENT SURGERY: ICD-10-CM

## 2022-08-25 DIAGNOSIS — Z96.642 AFTERCARE FOLLOWING LEFT HIP JOINT REPLACEMENT SURGERY: ICD-10-CM

## 2022-08-25 PROCEDURE — 97112 NEUROMUSCULAR REEDUCATION: CPT | Performed by: PHYSICAL THERAPIST

## 2022-08-25 PROCEDURE — 97140 MANUAL THERAPY 1/> REGIONS: CPT | Performed by: PHYSICAL THERAPIST

## 2022-08-25 PROCEDURE — 97110 THERAPEUTIC EXERCISES: CPT | Performed by: PHYSICAL THERAPIST

## 2022-08-25 NOTE — PROGRESS NOTES
Daily Note     Today's date: 2022  Patient name: Tien Strong  : 1940  MRN: [id number]  Referring provider: Savita Quintana MD  Dx:   Encounter Diagnosis     ICD-10-CM    1  Unilateral primary osteoarthritis, left hip  M16 12    2  Left hip pain  M25 552    3  Aftercare following left hip joint replacement surgery  Z47 1     [id number]                   Subjective: The patient states that she is doing okay today, offers no new complaints at start of session  Notes that she had seen the doctor and does not have to go back to see him until May  Per patient, she no longer has to follow her hip precautions  Objective: See treatment diary below      Assessment: Tolerated treatment well with no increase in pain during session  She was able to increase speed on treadmill today to 1 1 mph  Weakness remains noted in her hip with TE  Patient would benefit from continued PT  Plan: Continue per plan of care        Precautions: HTN, SOB, Hip Precautions        Manuals 8/25 8/2 8/4 8/10 8/11 8/18   PROM L Hip  10' 10' 10' 10' 10' 10'   MFR TO L ITB                                Neuro Re-Ed          Rhomberg Stance          Sharpened Rhomberg 30" hd          Tandem Stance 30" hd  3x  Foam  3x foam Hold x1 visit 3x foam 3x foam 3x foam   Weight Shifts                                            Ther Ex          Nu Step L5 10 min L5 10min L5 10 min L5 10 min L5 10 min L5 10 min    TM 1 1 mph  5 min 0 9 5 min Held x1 visit  NP resume NV 0 9 5 min 1 0 mph  5 min    Standing hip 3 way  15x  2# 15x  #1 5 15x   #1 5   bilat  15x  1 5# Ajith 15x  1 5# Ajith 15x  #2    Supine Hip Flexor Stretch 1 5' L 1 5' 1 5' 1 5' 1 5' L    Supine Hamstring Stretch 30" hold 3x Ajith 3x 3x 3x 3x B/L    Marching 15x   2# 1 5# 20x 20x 20x 1 5# 20x  1 5# 15x  #2   Hamstring curls  15x   2# 20x  !1 5 20x  #1 5 20x  1 5# 20x  1 5# 15x  #2   Mini Squats  20x 20x 20x 20x    Monster Walks 6x 10'  2# 6x 10ft  #1 5 6x 10' 6 x 10'  1 5# 6 x 10'  1 5# 6x 10'  #2   Side Steps 6x 10'  2# 6x 10ft  #1 5 6x 10' 6 x 10'  1 5# 6 x 10'  1 5# 6x 10'  #2   Step Ups 6" step   15x  Fwd/lat 6" step   15x  Fwd/lat 6" step 15 ea  Fwd/lat 6" step 15x ea  For/Lat 6" step 15x ea  For/Lat                          Ther Activity                                Gait Training                                 Modalities

## 2022-08-29 ENCOUNTER — OFFICE VISIT (OUTPATIENT)
Dept: PHYSICAL THERAPY | Facility: CLINIC | Age: 82
End: 2022-08-29
Payer: COMMERCIAL

## 2022-08-29 DIAGNOSIS — Z47.1 AFTERCARE FOLLOWING LEFT HIP JOINT REPLACEMENT SURGERY: ICD-10-CM

## 2022-08-29 DIAGNOSIS — M25.552 LEFT HIP PAIN: ICD-10-CM

## 2022-08-29 DIAGNOSIS — Z96.642 AFTERCARE FOLLOWING LEFT HIP JOINT REPLACEMENT SURGERY: ICD-10-CM

## 2022-08-29 DIAGNOSIS — M16.12 UNILATERAL PRIMARY OSTEOARTHRITIS, LEFT HIP: Primary | ICD-10-CM

## 2022-08-29 PROCEDURE — 97110 THERAPEUTIC EXERCISES: CPT

## 2022-08-29 PROCEDURE — 97112 NEUROMUSCULAR REEDUCATION: CPT

## 2022-08-29 PROCEDURE — 97140 MANUAL THERAPY 1/> REGIONS: CPT

## 2022-08-29 NOTE — PROGRESS NOTES
Daily Note     Today's date: 2022  Patient name: Felix Patel  : 1940  MRN: [id number]  Referring provider: Tanisha Xiong MD  Dx:   Encounter Diagnosis     ICD-10-CM    1  Unilateral primary osteoarthritis, left hip  M16 12    2  Left hip pain  M25 552    3  Aftercare following left hip joint replacement surgery  Z47 1     [id number] [id number]        Start Time: 1500  Stop Time: 1555  Total time in clinic (min): 55 minutes    Subjective: Stacy Mack has no complaints prior to therapy  Objective: See treatment diary below    Assessment: Patient did well t/o therapy and required little cueing for proper form  She is slowly progressing with her stregnth and L hip mobility  Plan: Continue per plan of care        Precautions: HTN, SOB, Hip Precautions    Manuals 8/25 8/29  8/10 8/11 8/18   PROM L Hip  10' 10'  10' 10' 10'   MFR TO L ITB                                Neuro Re-Ed          Rhomberg Stance          Sharpened Rhomberg 30" hd          Tandem Stance 30" hd  3x  Foam  3x foam  3x foam 3x foam 3x foam   Weight Shifts                                            Ther Ex          Nu Step L5 10 min L5 10 min  L5 10 min L5 10 min L5 10 min    TM 1 1 mph  5 min 1 1 mph  5 min  NP resume NV 0 9 5 min 1 0 mph  5 min    Standing hip 3 way  15x  2# 15x  2#  15x  1 5# Janeth 15x  1 5# Janeth 15x  #2    Supine Hip Flexor Stretch 1 5' L 1 5' L  1 5' 1 5' L    Supine Hamstring Stretch 30" hold 3x Janeth 3x janeth  3x 3x B/L    Marching 15x   2# 15x   2#  20x 1 5# 20x  1 5# 15x  #2   Hamstring curls  15x   2# 15x   2#  20x  1 5# 20x  1 5# 15x  #2   Mini Squats    20x 20x    Monster Walks 6x 10'  2# 6x 10'  2#  6 x 10'  1 5# 6 x 10'  1 5# 6x 10'  #2   Side Steps 6x 10'  2# 6x 10'  2#  6 x 10'  1 5# 6 x 10'  1 5# 6x 10'  #2   Step Ups 6" step   15x  Fwd/lat 6" step   15x  Fwd/lat  6" step 15x ea  For/Lat 6" step 15x ea  For/Lat                          Ther Activity                                Gait Training                              Modalities

## 2022-09-01 ENCOUNTER — OFFICE VISIT (OUTPATIENT)
Dept: PHYSICAL THERAPY | Facility: CLINIC | Age: 82
End: 2022-09-01
Payer: COMMERCIAL

## 2022-09-01 DIAGNOSIS — M25.552 LEFT HIP PAIN: ICD-10-CM

## 2022-09-01 DIAGNOSIS — Z96.642 AFTERCARE FOLLOWING LEFT HIP JOINT REPLACEMENT SURGERY: ICD-10-CM

## 2022-09-01 DIAGNOSIS — Z47.1 AFTERCARE FOLLOWING LEFT HIP JOINT REPLACEMENT SURGERY: ICD-10-CM

## 2022-09-01 DIAGNOSIS — M16.12 UNILATERAL PRIMARY OSTEOARTHRITIS, LEFT HIP: Primary | ICD-10-CM

## 2022-09-01 PROCEDURE — 97110 THERAPEUTIC EXERCISES: CPT

## 2022-09-01 PROCEDURE — 97140 MANUAL THERAPY 1/> REGIONS: CPT

## 2022-09-01 NOTE — PROGRESS NOTES
Daily Note     Today's date: 2022  Patient name: Mitchell Medel  : 1940  MRN: [id number]  Referring provider: Sharita Em MD  Dx:   Encounter Diagnosis     ICD-10-CM    1  Unilateral primary osteoarthritis, left hip  M16 12    2  Left hip pain  M25 552    3  Aftercare following left hip joint replacement surgery  Z47 1     [id number] [id number]                   Subjective: patient offered no complaints       Objective: See treatment diary below      Assessment: Patient able to progress with repetitions and resistance with various exercises  Good tolerance with progression with minimal cues required  Patient continues to present with deficits with strength and ROM requiring continued skilled physical therapy      Plan: Continue per plan of care  Progress treatment as tolerated         Precautions: HTN, SOB, Hip Precautions    Manuals    PROM L Hip  10' 10' 10'  10' 10'   MFR TO L ITB                                Neuro Re-Ed          Rhomberg Stance          Sharpened Rhomberg 30" hd          Tandem Stance 30" hd  3x  Foam  3x foam 3x foam   3x foam 3x foam   Weight Shifts                                            Ther Ex          Nu Step L5 10 min L5 10 min D/C  L5 10 min L5 10 min    TM 1 1 mph  5 min 1 1 mph  5 min 1 1  10 min   0 9 5 min 1 0 mph  5 min    Standing hip 3 way  15x  2# 15x  2# 20x  #2  15x  1 5# Ajith 15x  #2    Supine Hip Flexor Stretch 1 5' L 1 5' L 1 5 min  1 5' L    Supine Hamstring Stretch 30" hold 3x Ajith 3x ajith 3x bilat  3x B/L    Marching 15x   2# 15x   2# 20x  #2  20x  1 5# 15x  #2   Hamstring curls  15x   2# 15x   2# 20x  #2  20x  1 5# 15x  #2   Mini Squats     20x    Monster Walks 6x 10'  2# 6x 10'  2# 6 x 10'  #2  6 x 10'  1 5# 6x 10'  #2   Side Steps 6x 10'  2# 6x 10'  2# 6x 10 ft  #2  6 x 10'  1 5# 6x 10'  #2   Step Ups 6" step   15x  Fwd/lat 6" step   15x  Fwd/lat  6" step  2x10  Fwd/lat  6" step 15x ea  For/Lat                          Ther Activity                         Gait Training                                 Modalities

## 2022-09-06 ENCOUNTER — OFFICE VISIT (OUTPATIENT)
Dept: PHYSICAL THERAPY | Facility: CLINIC | Age: 82
End: 2022-09-06
Payer: COMMERCIAL

## 2022-09-06 DIAGNOSIS — M16.12 UNILATERAL PRIMARY OSTEOARTHRITIS, LEFT HIP: Primary | ICD-10-CM

## 2022-09-06 DIAGNOSIS — Z47.1 AFTERCARE FOLLOWING LEFT HIP JOINT REPLACEMENT SURGERY: ICD-10-CM

## 2022-09-06 DIAGNOSIS — Z96.642 AFTERCARE FOLLOWING LEFT HIP JOINT REPLACEMENT SURGERY: ICD-10-CM

## 2022-09-06 DIAGNOSIS — M25.552 LEFT HIP PAIN: ICD-10-CM

## 2022-09-06 PROCEDURE — 97140 MANUAL THERAPY 1/> REGIONS: CPT

## 2022-09-06 PROCEDURE — 97110 THERAPEUTIC EXERCISES: CPT

## 2022-09-06 NOTE — PROGRESS NOTES
Daily Note     Today's date: 2022  Patient name: Emre De La Torre  : 1940  MRN: [id number]  Referring provider: Leticia Gr MD  Dx:   Encounter Diagnosis     ICD-10-CM    1  Unilateral primary osteoarthritis, left hip  M16 12    2  Left hip pain  M25 552    3  Aftercare following left hip joint replacement surgery  Z47 1     C40 056                   Subjective: patient c/o pain from her knee to her ankle on her left leg in the front and back  Patient stated she was active yesterday in the yard working       Objective: See treatment diary below      Assessment: Patient progressing well towards goals  Good execution of exercise with program  Patient demonstrated the appropriate amount of fatigue for program   Will continue to monitor pain levels and progress as able      Plan: Continue per plan of care  Progress treatment as tolerated         Precautions: HTN, SOB, Hip Precautions    Manuals      PROM L Hip  10' 10' 10' 10'     MFR TO L ITB                                Neuro Re-Ed          Rhomberg Stance          Sharpened Rhomberg 30" hd          Tandem Stance 30" hd  3x  Foam  3x foam 3x foam  3x foam     Weight Shifts                                            Ther Ex          Nu Step L5 10 min L5 10 min D/C      TM 1 1 mph  5 min 1 1 mph  5 min 1 1  10 min  1 1  10 min      Standing hip 3 way  15x  2# 15x  2# 20x  #2 20x  #2 #2 5    Supine Hip Flexor Stretch 1 5' L 1 5' L 1 5 min 1 5 mn     Supine Hamstring Stretch 30" hold 3x Janeth 3x janeth 3x bilat 3xx     Marching 15x   2# 15x   2# 20x  #2 20x  #2 #2 5    Hamstring curls  15x   2# 15x   2# 20x  #2 20x  #2 #2 5    Mini Squats         Monster Walks 6x 10'  2# 6x 10'  2# 6 x 10'  #2 6x 10 t  #2     Side Steps 6x 10'  2# 6x 10'  2# 6x 10 ft  #2 6x 10 ft  #2     Step Ups 6" step   15x  Fwd/lat 6" step   15x  Fwd/lat  6" step  2x10  Fwd/lat 6" step  2x10  Fwd/lat      Step overs        add    Step down      add    Ther Activity                         Gait Training                                Modalities

## 2022-09-08 ENCOUNTER — OFFICE VISIT (OUTPATIENT)
Dept: PHYSICAL THERAPY | Facility: CLINIC | Age: 82
End: 2022-09-08
Payer: COMMERCIAL

## 2022-09-08 DIAGNOSIS — Z47.1 AFTERCARE FOLLOWING LEFT HIP JOINT REPLACEMENT SURGERY: ICD-10-CM

## 2022-09-08 DIAGNOSIS — M16.12 UNILATERAL PRIMARY OSTEOARTHRITIS, LEFT HIP: Primary | ICD-10-CM

## 2022-09-08 DIAGNOSIS — Z96.642 AFTERCARE FOLLOWING LEFT HIP JOINT REPLACEMENT SURGERY: ICD-10-CM

## 2022-09-08 DIAGNOSIS — M25.552 LEFT HIP PAIN: ICD-10-CM

## 2022-09-08 PROCEDURE — 97110 THERAPEUTIC EXERCISES: CPT

## 2022-09-08 PROCEDURE — 97112 NEUROMUSCULAR REEDUCATION: CPT

## 2022-09-08 NOTE — PROGRESS NOTES
Daily Note     Today's date: 2022  Patient name: Marti Montalvo  : 1940  MRN: [id number]  Referring provider: Sommer Sal MD  Dx:   Encounter Diagnosis     ICD-10-CM    1  Unilateral primary osteoarthritis, left hip  M16 12    2  Left hip pain  M25 552    3  Aftercare following left hip joint replacement surgery  Z47 1     [id number]                   Subjective: Patient noted no new complaints  Objective: See treatment diary below      Assessment: Tolerated treatment fair  Patient was able to perform added exercises without complaints and demonstrated good technique with increased weights from 2# to 2 5#  Patient would benefit from continued PT  Plan: Continue per plan of care        Precautions: HTN, SOB, Hip Precautions    Manuals     PROM L Hip  10' 10' 10' 10' 10'    MFR TO L ITB                                Neuro Re-Ed          Rhomberg Stance          Sharpened Rhomberg 30" hd          Tandem Stance 30" hd  3x  Foam  3x foam 3x foam  3x foam 3 x foam    Weight Shifts                                            Ther Ex          Nu Step L5 10 min L5 10 min D/C      TM 1 1 mph  5 min 1 1 mph  5 min 1 1  10 min  1 1  10 min  1 1 10 min    Standing hip 3 way  15x  2# 15x  2# 20x  #2 20x  #2 #2 5   x15    Supine Hip Flexor Stretch 1 5' L 1 5' L 1 5 min 1 5 mn 1 5 min    Supine Hamstring Stretch 30" hold 3x Ajith 3x ajith 3x bilat 3xx 3 x ea    Marching 15x   2# 15x   2# 20x  #2 20x  #2 #2 5 15x    Hamstring curls  15x   2# 15x   2# 20x  #2 20x  #2 #2 5 15x    Mini Squats         Monster Walks 6x 10'  2# 6x 10'  2# 6 x 10'  #2 6x 10 t  #2 6 x10 ft #2 5    Side Steps 6x 10'  2# 6x 10'  2# 6x 10 ft  #2 6x 10 ft  #2 6 x10 ft #2 5    Step Ups 6" step   15x  Fwd/lat 6" step   15x  Fwd/lat  6" step  2x10  Fwd/lat 6" step  2x10  Fwd/lat  6" step  2x10  Fwd/lat     Step overs        6" x 10    Step down      4" x 10 ea    Ther Activity                                Gait Training                                Modalities

## 2022-09-13 ENCOUNTER — EVALUATION (OUTPATIENT)
Dept: PHYSICAL THERAPY | Facility: CLINIC | Age: 82
End: 2022-09-13
Payer: COMMERCIAL

## 2022-09-13 DIAGNOSIS — Z96.642 AFTERCARE FOLLOWING LEFT HIP JOINT REPLACEMENT SURGERY: ICD-10-CM

## 2022-09-13 DIAGNOSIS — M25.552 LEFT HIP PAIN: ICD-10-CM

## 2022-09-13 DIAGNOSIS — M16.12 UNILATERAL PRIMARY OSTEOARTHRITIS, LEFT HIP: Primary | ICD-10-CM

## 2022-09-13 DIAGNOSIS — Z47.1 AFTERCARE FOLLOWING LEFT HIP JOINT REPLACEMENT SURGERY: ICD-10-CM

## 2022-09-13 PROCEDURE — 97140 MANUAL THERAPY 1/> REGIONS: CPT | Performed by: PHYSICAL THERAPIST

## 2022-09-13 PROCEDURE — 97110 THERAPEUTIC EXERCISES: CPT | Performed by: PHYSICAL THERAPIST

## 2022-09-13 NOTE — LETTER
September 15, 2022    MD Ginna Padilla [phone]    Patient: Juan M Hawley   YOB: 1940   Date of Visit: 2022     Encounter Diagnosis     ICD-10-CM    1  Unilateral primary osteoarthritis, left hip  M16 12    2  Left hip pain  M25 552    3  Aftercare following left hip joint replacement surgery  Z47 1     R98 131        Dear Dr Ling Mccormick: Thank you for your recent referral of Juan M Hawley  Please review the attached evaluation summary from [address] recent visit  Please verify that you agree with the plan of care by signing the attached order  If you have any questions or concerns, please do not hesitate to call  I sincerely appreciate the opportunity to share in the care of one of your patients and hope to have another opportunity to work with you in the near future  Sincerely,    Jessica Mccall, PT      Referring Provider:      I certify that I have read the below Plan of Care and certify the need for these services furnished under this plan of treatment while under my care  MD Ginna Padilla [phone]  Via Fax: [phone]          PT Re-Evaluation     Today's date: 2022  Patient name: Juan M Hawley  : 1940  MRN: [id number]  Referring provider: Juventino El MD  Dx:   Encounter Diagnosis     ICD-10-CM    1  Unilateral primary osteoarthritis, left hip  M16 12    2  Left hip pain  M25 552    3  Aftercare following left hip joint replacement surgery  Z47 1     Z96 642                   Assessment  Assessment details: The patient is a [de-identified] yoF who presents with strength and ROM deficits s/p a left MABEL   She demonstrates improved strength, ROM and function  She is able to be discharged to an [address] [place] [address]        Impairments: abnormal muscle firing, abnormal or restricted ROM, activity intolerance, impaired physical strength, lacks appropriate home exercise program and pain with function  Understanding of Dx/Px/POC: good   Prognosis: good    Goals  Short Term Goals:    1  Initiate and advance HEP - MET  2  AROM Left Hip flexion 90 degrees - Partially MET  3  AROM Left knee ext 0 degrees - MET    Long Term Goals:    1  Indep with HEP  2  Walk - Partially MET  3  Sit to stand - MET    Plan  Plan details: Discharge to [facility] including supine HS stretch  Planned therapy interventions: home exercise program  Treatment plan discussed with: patient        Subjective Evaluation    History of Present Illness  Mechanism of injury: Pt reports Left hip pain began gradually in   Pt with L THR on 6/3/2022  Quality of life: excellent    Pain  Current pain ratin  At best pain ratin  At worst pain rating: 3  Quality: tight  Relieving factors: ice  Aggravating factors: walking  Progression: improved    Treatments  Previous treatment: physical therapy  Current treatment: medication and physical therapy  Patient Goals  Patient goals for therapy: decreased edema, decreased pain, increased motion, increased strength and independence with ADLs/IADLs          Objective     Active Range of Motion   Left Hip   Flexion: 90 degrees     Right Hip   Flexion: 90 degrees   External rotation (90/90): 22 degrees   Internal rotation (90/90): WFL  Left Knee   Flexion: 125 degrees     Right Knee   Flexion: 129 degrees     Strength/Myotome Testing     Left Hip   Planes of Motion   Flexion: 4-  External rotation: 4-  Internal rotation: 4-    Right Hip   Planes of Motion   Flexion: 4-  External rotation: 4-  Internal rotation: 4-    Left Knee   Flexion: 4  Extension: 4+    Right Knee   Flexion: 4  Extension: 4+    Tests     Left Hip   Negative Ely's  Right Hip   Negative Ely's       Additional Tests Details  Hamstring Flexibility:  R: -65  L:  -62    Noble's:  R: -  L: +    Ambulation     Comments   TU' 26 39" with RW  TUG 8/10: 58" without AD             Precautions: HTN, SOB, Hip Precautions     Manuals 8/25 8/29 9/1 9/6 9/8 9/12   PROM L Hip  10' 10' 10' 10' 10'     MFR TO L ITB                                               Neuro Re-Ed               Rhomberg Stance               Sharpened Rhomberg 30" hd                Tandem Stance 30" hd  3x  Foam  3x foam 3x foam  3x foam 3 x foam     Weight Shifts                                                                Ther Ex               Nu Step L5 10 min L5 10 min D/C         TM 1 1 mph  5 min 1 1 mph  5 min 1 1  10 min  1 1  10 min  1 1 10 min  1 2 mph  10 min   Standing hip 3 way  15x  2# 15x  2# 20x  #2 20x  #2 #2 5   x15     Supine Hip Flexor Stretch 1 5' L 1 5' L 1 5 min 1 5 mn 1 5 min     Supine Hamstring Stretch 30" hold 3x Ajith 3x ajith 3x bilat 3xx 3 x ea     Marching 15x   2# 15x   2# 20x  #2 20x  #2 #2 5 15x     Hamstring curls  15x   2# 15x   2# 20x  #2 20x  #2 #2 5 15x     Mini Squats               Monster Walks 6x 10'  2# 6x 10'  2# 6 x 10'  #2 6x 10 t  #2 6 x10 ft #2  5     Side Steps 6x 10'  2# 6x 10'  2# 6x 10 ft  #2 6x 10 ft  #2 6 x10 ft #2  5     Step Ups 6" step   15x  Fwd/lat 6" step   15x  Fwd/lat  6" step  2x10  Fwd/lat 6" step  2x10  Fwd/lat  6" step  2x10  Fwd/lat      Step overs           6" x 10     Step down         4" x 10 ea     Ther Activity                                               Gait Training                                               Modalities

## 2022-09-13 NOTE — PROGRESS NOTES
PT Re-Evaluation     Today's date: 2022  Patient name: Slyvia Locke  : 1940  MRN: [id number]  Referring provider: Marivel Herring MD  Dx:   Encounter Diagnosis     ICD-10-CM    1  Unilateral primary osteoarthritis, left hip  M16 12    2  Left hip pain  M25 552    3  Aftercare following left hip joint replacement surgery  Z47 1     Z96 642                   Assessment  Assessment details: The patient is a [de-identified] yoF who presents with strength and ROM deficits s/p a left MABEL   She demonstrates improved strength, ROM and function  She is able to be discharged to an [facility]  Impairments: abnormal muscle firing, abnormal or restricted ROM, activity intolerance, impaired physical strength, lacks appropriate home exercise program and pain with function  Understanding of Dx/Px/POC: good   Prognosis: good    Goals  Short Term Goals:    1  Initiate and advance HEP - MET  2  AROM Left Hip flexion 90 degrees - Partially MET  3  AROM Left knee ext 0 degrees - MET    Long Term Goals:    1  Indep with HEP  2  Walk - Partially MET  3  Sit to stand - MET    Plan  Plan details: Discharge to [facility] including supine HS stretch  Planned therapy interventions: home exercise program  Treatment plan discussed with: patient        Subjective Evaluation    History of Present Illness  Mechanism of injury: Pt reports Left hip pain began gradually in   Pt with L THR on 6/3/2022        Quality of life: excellent    Pain  Current pain ratin  At best pain ratin  At worst pain rating: 3  Quality: tight  Relieving factors: ice  Aggravating factors: walking  Progression: improved    Treatments  Previous treatment: physical therapy  Current treatment: medication and physical therapy  Patient Goals  Patient goals for therapy: decreased edema, decreased pain, increased motion, increased strength and independence with ADLs/IADLs          Objective     Active Range of Motion   Left Hip   Flexion: 90 degrees     Right Hip   Flexion: 90 degrees   External rotation (90/90): 22 degrees   Internal rotation (90/90): WFL  Left Knee   Flexion: 125 degrees     Right Knee   Flexion: 129 degrees     Strength/Myotome Testing     Left Hip   Planes of Motion   Flexion: 4-  External rotation: 4-  Internal rotation: 4-    Right Hip   Planes of Motion   Flexion: 4-  External rotation: 4-  Internal rotation: 4-    Left Knee   Flexion: 4  Extension: 4+    Right Knee   Flexion: 4  Extension: 4+    Tests     Left Hip   Negative Ely's  Right Hip   Negative Ely's  Additional Tests Details  Hamstring Flexibility:  R: -65  L:  -58    Noble's:  R: -  L: +    Ambulation     Comments   TU' 26 39" with RW  TUG 8/10: 58" without AD             Precautions: HTN, SOB, Hip Precautions     Manuals    PROM L Hip  10' 10' 10' 10' 10'     MFR TO L ITB                                               Neuro Re-Ed               Rhomberg Stance               Sharpened Rhomberg 30" hd                Tandem Stance 30" hd  3x  Foam  3x foam 3x foam  3x foam 3 x foam     Weight Shifts                                                                Ther Ex               Nu Step L5 10 min L5 10 min D/C         TM 1 1 mph  5 min 1 1 mph  5 min 1 1  10 min  1 1  10 min  1 1 10 min  1 2 mph  10 min   Standing hip 3 way  15x  2# 15x  2# 20x  #2 20x  #2 #2 5   x15     Supine Hip Flexor Stretch 1 5' L 1 5' L 1 5 min 1 5 mn 1 5 min     Supine Hamstring Stretch 30" hold 3x Ajith 3x ajith 3x bilat 3xx 3 x ea     Marching 15x   2# 15x   2# 20x  #2 20x  #2 #2 5 15x     Hamstring curls  15x   2# 15x   2# 20x  #2 20x  #2 #2 5 15x     Mini Squats               Monster Walks 6x 10'  2# 6x 10'  2# 6 x 10'  #2 6x 10 t  #2 6 x10 ft #2  5     Side Steps 6x 10'  2# 6x 10'  2# 6x 10 ft  #2 6x 10 ft  #2 6 x10 ft #2  5     Step Ups 6" step   15x  Fwd/lat 6" step   15x  Fwd/lat  6" step  2x10  Fwd/lat 6" step  2x10  Fwd/lat  6" step  2x10  Fwd/lat      Step overs           6" x 10   Step down         4" x 10 ea     Ther Activity                                               Gait Training                                               Modalities

## 2022-09-15 ENCOUNTER — APPOINTMENT (OUTPATIENT)
Dept: PHYSICAL THERAPY | Facility: CLINIC | Age: 82
End: 2022-09-15
Payer: COMMERCIAL

## 2024-04-24 NOTE — LETTER
From: Alberto Erickson  To: Yael Cole  Sent: 2024 3:46 PM CDT  Subject: Medication Refill before my appointment    My appointment isn’t until May, but it says my prescription  in early April. Can I get my medication refilled before my next appointment please?  Thanks    Kasie 10, 2021    Aditya Meade, [address] [facility]  [address] [address]    Patient: Georgette Kinsey   YOB: 1940   Date of Visit: 6/10/2021     Encounter Diagnosis     ICD-10-CM    1  Primary osteoarthritis of left knee  M17 12    2  Chronic pain of left knee  M25 562     G89 29        Dear Dr Glenwood Dubin:    Thank you for your recent referral of Georgette Kinsey  Please review the attached evaluation summary from [address] recent visit  Please verify that you agree with the plan of care by signing the attached order  If you have any questions or concerns, please do not hesitate to call  I sincerely appreciate the opportunity to share in the care of one of your patients and hope to have another opportunity to work with you in the near future  Sincerely,    Mandie Scruggs, PT      Referring Provider:      I certify that I have read the below Plan of Care and certify the need for these services furnished under this plan of treatment while under my care  Aditya Meade MD  [address] [facility]  [address]   Via Fax: [phone]          PT Evaluation     Today's date: 2021  Patient name: Georgette Knisey  : 1940  MRN: [id number]  Referring provider: Leonard Mascorro MD  Dx:   Encounter Diagnosis     ICD-10-CM    1  Chronic pain of left knee  M25 562     G89 29    2  Primary osteoarthritis of left knee  M17 12                   Assessment  Assessment details: Patient was found to have gross strength deficits in the LE as well as decreased active and passive ROM in the L knee and hip and the R hip  Pain in the medial L knee may have limited motion  Pt demonstrates gains in strength and ROM since her evaluationCourse of skilled therapy may be prolonged due to comprehensive PMH  Patient likely to need 6 weeks of PT twice a weak to address aforementioned deficits through strengthening, flexibility, and modalities   PT recommends patient does follow up with orthopaedic MD  PT feel that patient would be a good candidate for joint supplement injections  Evaluation and exercises performed Yvon Rodney DPT  Impairments: abnormal gait, abnormal or restricted ROM, activity intolerance, impaired balance, impaired physical strength, lacks appropriate home exercise program, pain with function and poor posture   Understanding of Dx/Px/POC: good   Prognosis: fair    Goals  ST  Patient will have increased ROM in the hip and knee by 20-30% - MET  2  Patient will have increased gross strength in the LE by 1-2 MMT grades - MET  3  Patient will have decreased pain 1-2 on VAS - MET  4  Initiate HEP - MET    LT  Patient will have less difficulty with stair climbing - Progressing  2  Patient will have less difficulty with ascending and descending curbs - Progressing  3  Patient will have less difficulty with rising from chairs - Progressing  4  Discharge with HEP    Plan  Plan details: POC discussed with patient who was motivated to comply  Patient would benefit from: skilled physical therapy and PT eval  Planned modality interventions: cryotherapy and thermotherapy: hydrocollator packs  Planned therapy interventions: patient education, postural training, strengthening, stretching, therapeutic activities, therapeutic exercise, transfer training, home exercise program, gait training, functional ROM exercises, flexibility, coordination, balance and manual therapy  Frequency: 2x week  Duration in weeks: 6  Treatment plan discussed with: patient        Subjective Evaluation    History of Present Illness  Mechanism of injury: Patient reports gradual onset knee pain starting about 2 months ago when climbing stairs and getting in and out of chairs  She thought her knee pain was related to infusion treatments she has been undergoing for lung cancer treatment  PMH significant for lung cancer, OA, and HTN  (controlled)  Patient is not working  Pain  Current pain ratin  At best pain ratin  At worst pain rating: 10  Location: L medial knee, joint line, patella, and medial tibial plateau and medial femoral condyle  Quality: burning  Relieving factors: rest  Aggravating factors: stair climbing      Diagnostic Tests  X-ray: abnormal  Treatments  Current treatment: medication and physical therapy  Patient Goals  Patient goals for therapy: decreased pain, increased strength, improved balance, independence with ADLs/IADLs and return to sport/leisure activities          Objective     Palpation   Left   Muscle spasm in the iliopsoas  Tenderness of the iliopsoas and rectus femoris  Additional Palpation Details  Tenderness in the rectus femoris just superior to the patella     Tenderness     Left Hip   Tenderness in the greater trochanter  No tenderness in the PSIS and iliac crest    Left Knee   Tenderness in the inferior patella, medial joint line, medial patella, medial retinaculum, patellar tendon, pes anserinus, quadriceps tendon and superior patella  No tenderness in the MCL (distal) and MCL (proximal)  Neurological Testing     Reflexes   Left   Patellar (L4): normal (2+)  Achilles (S1): normal (2+)    Right   Patellar (L4): normal (2+)  Achilles (S1): normal (2+)    Active Range of Motion   Left Hip   Flexion: 45 degrees   External rotation (90/90): 0 degrees   Internal rotation (90/90): 0 degrees     Right Hip   Flexion: 91 degrees   Left Knee   Flexion: 115 degrees   Extension: -5 degrees     Right Knee   Flexion: [facility]    Mobility   Patellar Mobility:   Left Knee   WFL: medial, lateral, superior and inferior       Additional Mobility Details  Superior/Inferior motion produced pain     Strength/Myotome Testing     Left Hip   Planes of Motion   Flexion: 2+  Extension: 4  Abduction: 4-  Adduction: 3  External rotation: 3  Internal rotation: 4    Right Hip   Planes of Motion   Flexion: 4  Extension: 5  Abduction: 4-  Adduction: 3  External rotation: 3  Internal rotation: 4    Left Knee   Flexion: 4-  Extension: 4    Right Knee   Flexion: 4  Extension: 4    Tests     Left Knee   Positive valgus stress test at 30 degrees  Negative varus stress test at 30 degrees  Additional Tests Details  Grade 1 Valgus Stress Test     Ambulation     Ambulation: Level Surfaces   Ambulation without assistive device: independent    Additional Level Surfaces Ambulation Details  Patient ambulates independently on level surfaces with decreased eric, decreased step length, decreased knee/hip flexion, decreased foot clearance, decreased push off, flat foot contact and increased stance time on R LE  Precautions:  Active lung cancer treatment, OA         Manuals 5/27 6/1 6/3 6/7  6/10   Iliopsoas, quadricep, hamstring, ABD, gastroc PROM   15 min 15 min    15 min     MFR to L hip flexors, and ITB     13'    13'   PF Mobs     2'    2'                 Neuro Re-Ed             Nu-Step  10 min L3 10 min L4  10 min L3 L5 10 min  L5 10'   Monster walk 6x 10 ft Red 6x 10 ft Red 6x10 ft 6 x 10 feet  Red   6X10 feet   Side Step and Squat  6x 10 ft Red 6x 10 ft Red 6x10 ft 6 x 10 feet  6X10 feet   Tandem Walk  8x 10 ft 8x 10  ft 8x10 feet 8 x 10 feet  8x10 feet   Standing SLR 3-way  10x ea Red 10x ea Red 10x ea Bilat 10x each Ajith  Red   10x each Bilat                               Ther Ex             Long arc quad  10x Bilat 2x10 2x10 bilat 2x10 Ajith     Heel slides in supine              Stair heel and toe raises             Front and Lat step and heel tap down up 4" bilat 10x ea  Fwd/ Retro 4"  10x ea Bilat  Lat 10x 4" 10x each forward and Lat 4" 10x each  Ajith front and lateral only     Seated march 10x Bilat 2x10 2x10 2x10 Ajith      Supine SLR 15x Bilat NT 15x Bilat 10x Ajith     Self butterfly stretch supine 15" hd 5x 5x         S/L Hip Abd             Bridges 15x 15x 15x 10x     HEP             Ther Activity                                         Gait Training                                         Modalities             Cold Pack to L knee in seated 15 min 15 min   15 min      Hot Pack to L knee in seated

## 2025-04-25 NOTE — PROGRESS NOTES
PT Evaluation     Today's date: 2025  Patient name: Bettye Millan  : 1940  MRN: [id number]  Referring provider: Loc Weiss DO  Dx:   Encounter Diagnosis     ICD-10-CM    1. Acute pain of left shoulder  M25.512                      Assessment  Impairments: abnormal or restricted ROM, activity intolerance, impaired physical strength, lacks appropriate home exercise program, pain with function, poor posture , poor body mechanics and activity limitations  Other impairment: decreased flexibility    Assessment details: The patient is an 84 y/o female who presents to OPPT with diagnosis of acute L shoulder pain.  She has complaints of constant pain.  Pain is along the top of her shoulder, towards her neck and down into her shoulder blade.  She demonstrates deficits with decreased A/PROM and strength, decreased flexibility, decreased postural awareness and TTP.  These deficits lead to difficulty sleeping, difficulty with reaching, lifting and carrying and decreased tolerance to functional activities.  She has difficulty with reaching (OH, out to the side, across her body and behind her back), lifting items and carrying items.  She notes pain and difficulty with daily activities such as washing her hair, dressing, driving and reaching into cabinets.  She reports difficulty sleeping - pain can wake her up overnight or she has a hard time finding a comfortable position to sleep.  She is unable to lay on her L side to sleep.  Secondary to pain and above deficits she is limited with her overall mobility and function.  The patient would benefit from continued PT to address deficits and improve function.  Tx to include ROM, stretching, strengthening, modalities, HEP, pt education, postural ed, lifting/body mechanics, neuro re-ed, balance/proprioception Te, MT and equipment.      Understanding of Dx/Px/POC: good     Prognosis: good    Goals  STGs:  1.  Initiate and complete HEP with verbal cues.  2.  Decrease L  shoulder pain by > 25% in 4 weeks.  3.  Improve L shoulder strength by 1 grade in 4 weeks.  4.  Improve L shoulder ROM by 15-20 degrees in 4 weeks.  LTGs:  1.  Patient to be I with HEP in 12 weeks.  2.  Improve L shoulder ROM to WFL t/o in 12 weeks to improve function.  3.  Improve L UE strength to > or = to 4/5 t/o in 12 weeks to improve function.  4.  Decrease L shoulder pain to < or = to 2-3/10 with activity in 12 weeks to improve function.    5.  Recreational performance in related activities is improved to PLOF in 12 weeks.  6.  Patient to report improved sleep and will tolerate laying on L side to sleep in 12 weeks.  7.  FOTO outcomes to meet or exceed expectations in 12 weeks.  8.  ADL performance is improved to PLOF in 12 weeks.            Plan  Patient would benefit from: skilled physical therapy    Planned therapy interventions: IASTM, body mechanics training, manual therapy, neuromuscular re-education, patient/caregiver education, postural training, self care, strengthening, therapeutic activities, therapeutic exercise, stretching and home exercise program    Frequency: 2x week  Duration in weeks: 12  Plan of Care beginning date: 4/30/2025  Plan of Care expiration date: 7/23/2025  Treatment plan discussed with: patient  Plan details: Modalities and therapy interventions prn.        Subjective Evaluation    History of Present Illness  Mechanism of injury: The patient states that she developed L shoulder pain a few months ago.  She notes that she would get pain when she would use it but did not seek treatment for it.       She had seen her PCP on 4/24/25 for her annual wellness visit, told him about her shoulder pain.  No imaging was completed.  She was referred to OPPT and she now presents for her evaluation.  She will be going back to see the doctor as needed for her follow up appointment.  She is to see how PT goes and call the doctor if no change with therapy.        She has pain with driving, reaching  "and lifting.  The patient is constant.    Pain is along the top of the shoulder and can radiate over towards her neck.  Pain can also radiate into her shoulder blade.  She denies any radiating arm pain, denies any N&T into LUE.    Difficulty sleeping noted, unable to lay on her L side.    Patient Goals  Patient goals for therapy: increased motion, decreased pain and increased strength  Patient goal: \"To not have the pain in my shoulder.\"  Pain  At best pain ratin  At worst pain rating: 10  Location: L Shoulder - top of shoulder, to neck, into shoulder blade  Quality: burning, dull ache, discomfort and radiating  Relieving factors: heat and rest (Biofreeze, hot shower)  Aggravating factors: lifting (Driving, reaching, carrying)    Social Support  Lives with: spouse    Employment status: not working  Hand dominance: right        Objective     Static Posture     Head  Forward.    Shoulders  Rounded.    Postural Observations  Seated posture: fair      Palpation   Left   No palpable tenderness to the rhomboids.   Tenderness of the levator scapulae and upper trapezius.     Right   No palpable tenderness to the levator scapulae, rhomboids and upper trapezius.     Tenderness     Left Shoulder   Tenderness in the AC joint.     Neurological Testing     Sensation     Shoulder   Left Shoulder   Intact: light touch    Right Shoulder   Intact: Light touch    Active Range of Motion   Left Shoulder   Flexion: 115 degrees with pain  Abduction: 90 degrees with pain  External rotation 45°: 45 degrees with pain  Internal rotation 45°: 55 degrees     Right Shoulder   Flexion: 170 degrees   Abduction: 170 degrees   External rotation 90°: 80 degrees  Internal rotation 90°: 55 degrees     Left Elbow   Flexion: WFL  Extension: WFL    Right Elbow   Flexion: WFL  Extension: WFL    Passive Range of Motion   Left Shoulder   Flexion: 120 degrees with pain  Abduction: 95 degrees with pain  External rotation 45°: 50 degrees with pain  Internal " "rotation 45°: 55 degrees with pain    Strength/Myotome Testing     Left Shoulder     Planes of Motion   Flexion: 2+   Abduction: 2+   External rotation at 45°: 3-   Internal rotation at 0°: 4-     Right Shoulder     Planes of Motion   Flexion: WFL   Abduction: WFL   External rotation at 0°: WFL   Internal rotation at 0°: WFL                 Precautions: HTN, h/o cancer       Manuals 4/30       L Shoulder all planes to chey                                Neuro Re-Ed         MTP/LTP        Ball Roll on Wall        Ajith ER w/TBand                                        Ther Ex        UBE Alt for UE Strength        Isometrics        Tband IR/ER        Pulleys   Flex/Scap        Table Slides  Flex/Scap Flex 5\"x10       Shrugs 10x       Scap Retractions 3\"x10       Supine Punch        Cane Flex/Abd/ER Cane Flex   Seated 10x       S/L ER & Abd        UT Stretch 15-20\"x5                                       Ther Activity                        Gait Training                        Modalities                           Access Code: [email or web address]  URL: [email or web address]  Date: 04/30/2025  Prepared by: Zunilda    Exercises  - Seated Shoulder Shrugs  - 1 x daily - 7 x weekly - 2 sets - 10 reps  - Seated Scapular Retraction  - 1 x daily - 7 x weekly - 2 sets - 10 reps  - Seated Shoulder Flexion Towel Slide at Table Top  - 1 x daily - 7 x weekly - 1 sets - 10 reps - 5-10\" hold  - Seated Shoulder Flexion AAROM with Dowel  - 1 x daily - 7 x weekly - 1 sets - 10 reps - 5\" hold  - Seated Upper Trapezius Stretch  - 1 x daily - 7 x weekly - 1 sets - 5 reps - 20\" hold         "

## 2025-04-30 ENCOUNTER — EVALUATION (OUTPATIENT)
Dept: PHYSICAL THERAPY | Facility: CLINIC | Age: 85
End: 2025-04-30
Attending: INTERNAL MEDICINE
Payer: COMMERCIAL

## 2025-04-30 DIAGNOSIS — M25.512 ACUTE PAIN OF LEFT SHOULDER: Primary | ICD-10-CM

## 2025-04-30 PROCEDURE — 97161 PT EVAL LOW COMPLEX 20 MIN: CPT | Performed by: PHYSICAL THERAPIST

## 2025-04-30 PROCEDURE — 97110 THERAPEUTIC EXERCISES: CPT | Performed by: PHYSICAL THERAPIST

## 2025-04-30 NOTE — LETTER
2025    Loc Weiss DO  [address]  [address]  [place] [address]    Patient: Bettye Millan   YOB: 1940   Date of Visit: 2025     Encounter Diagnosis     ICD-10-CM    1. Acute pain of left shoulder  M25.512           Dear Dr. Loc Weiss DO:    Thank you for your recent referral of Bettye Millan. Please review the attached evaluation summary from Bettye's recent visit.     Please verify that you agree with the plan of care by signing the attached order.     If you have any questions or concerns, please do not hesitate to call.     I sincerely appreciate the opportunity to share in the care of one of your patients and hope to have another opportunity to work with you in the near future.       Sincerely,    Zunilda Salazar, PT      Referring Provider:      I certify that I have read the below Plan of Care and certify the need for these services furnished under this plan of treatment while under my care.                    Loc Weiss DO  [address]  [address]  [place] [id number]  Via Fax: [phone]          PT Evaluation     Today's date: 2025  Patient name: Bettye Millan  : 1940  MRN: [id number]  Referring provider: Loc Weiss DO  Dx:   Encounter Diagnosis     ICD-10-CM    1. Acute pain of left shoulder  M25.512                      Assessment  Impairments: abnormal or restricted ROM, activity intolerance, impaired physical strength, lacks appropriate home exercise program, pain with function, poor posture , poor body mechanics and activity limitations  Other impairment: decreased flexibility    Assessment details: The patient is an 86 y/o female who presents to OPPT with diagnosis of acute L shoulder pain.  She has complaints of constant pain.  Pain is along the top of her shoulder, towards her neck and down into her shoulder blade.  She demonstrates deficits with decreased A/PROM and strength, decreased flexibility, decreased postural  awareness and TTP.  These deficits lead to difficulty sleeping, difficulty with reaching, lifting and carrying and decreased tolerance to functional activities.  She has difficulty with reaching (OH, out to the side, across her body and behind her back), lifting items and carrying items.  She notes pain and difficulty with daily activities such as washing her hair, dressing, driving and reaching into cabinets.  She reports difficulty sleeping - pain can wake her up overnight or she has a hard time finding a comfortable position to sleep.  She is unable to lay on her L side to sleep.  Secondary to pain and above deficits she is limited with her overall mobility and function.  The patient would benefit from continued PT to address deficits and improve function.  Tx to include ROM, stretching, strengthening, modalities, HEP, pt education, postural ed, lifting/body mechanics, neuro re-ed, balance/proprioception Te, MT and equipment.      Understanding of Dx/Px/POC: good     Prognosis: good    Goals  STGs:  1.  Initiate and complete HEP with verbal cues.  2.  Decrease L shoulder pain by > 25% in 4 weeks.  3.  Improve L shoulder strength by 1 grade in 4 weeks.  4.  Improve L shoulder ROM by 15-20 degrees in 4 weeks.  LTGs:  1.  Patient to be I with HEP in 12 weeks.  2.  Improve L shoulder ROM to WFL t/o in 12 weeks to improve function.  3.  Improve L UE strength to > or = to 4/5 t/o in 12 weeks to improve function.  4.  Decrease L shoulder pain to < or = to 2-3/10 with activity in 12 weeks to improve function.    5.  Recreational performance in related activities is improved to PLOF in 12 weeks.  6.  Patient to report improved sleep and will tolerate laying on L side to sleep in 12 weeks.  7.  FOTO outcomes to meet or exceed expectations in 12 weeks.  8.  ADL performance is improved to PLOF in 12 weeks.            Plan  Patient would benefit from: skilled physical therapy    Planned therapy interventions: IASTM, body  "mechanics training, manual therapy, neuromuscular re-education, patient/caregiver education, postural training, self care, strengthening, therapeutic activities, therapeutic exercise, stretching and home exercise program    Frequency: 2x week  Duration in weeks: 12  Plan of Care beginning date: 2025  Plan of Care expiration date: 2025  Treatment plan discussed with: patient  Plan details: Modalities and therapy interventions prn.        Subjective Evaluation    History of Present Illness  Mechanism of injury: The patient states that she developed L shoulder pain a few months ago.  She notes that she would get pain when she would use it but did not seek treatment for it.       She had seen her PCP on 25 for her annual wellness visit, told him about her shoulder pain.  No imaging was completed.  She was referred to OPPT and she now presents for her evaluation.  She will be going back to see the doctor as needed for her follow up appointment.  She is to see how PT goes and call the doctor if no change with therapy.        She has pain with driving, reaching and lifting.  The patient is constant.    Pain is along the top of the shoulder and can radiate over towards her neck.  Pain can also radiate into her shoulder blade.  She denies any radiating arm pain, denies any N&T into LUE.    Difficulty sleeping noted, unable to lay on her L side.    Patient Goals  Patient goals for therapy: increased motion, decreased pain and increased strength  Patient goal: \"To not have the pain in my shoulder.\"  Pain  At best pain ratin  At worst pain rating: 10  Location: L Shoulder - top of shoulder, to neck, into shoulder blade  Quality: burning, dull ache, discomfort and radiating  Relieving factors: heat and rest (Biofreeze, hot shower)  Aggravating factors: lifting (Driving, reaching, carrying)    Social Support  Lives with: spouse    Employment status: not working  Hand dominance: right        Objective     Static " "Posture     Head  Forward.    Shoulders  Rounded.    Postural Observations  Seated posture: fair      Palpation   Left   No palpable tenderness to the rhomboids.   Tenderness of the levator scapulae and upper trapezius.     Right   No palpable tenderness to the levator scapulae, rhomboids and upper trapezius.     Tenderness     Left Shoulder   Tenderness in the AC joint.     Neurological Testing     Sensation     Shoulder   Left Shoulder   Intact: light touch    Right Shoulder   Intact: Light touch    Active Range of Motion   Left Shoulder   Flexion: 115 degrees with pain  Abduction: 90 degrees with pain  External rotation 45°: 45 degrees with pain  Internal rotation 45°: 55 degrees     Right Shoulder   Flexion: 170 degrees   Abduction: 170 degrees   External rotation 90°: 80 degrees  Internal rotation 90°: 55 degrees     Left Elbow   Flexion: WFL  Extension: WFL    Right Elbow   Flexion: WFL  Extension: WFL    Passive Range of Motion   Left Shoulder   Flexion: 120 degrees with pain  Abduction: 95 degrees with pain  External rotation 45°: 50 degrees with pain  Internal rotation 45°: 55 degrees with pain    Strength/Myotome Testing     Left Shoulder     Planes of Motion   Flexion: 2+   Abduction: 2+   External rotation at 45°: 3-   Internal rotation at 0°: 4-     Right Shoulder     Planes of Motion   Flexion: WFL   Abduction: WFL   External rotation at 0°: WFL   Internal rotation at 0°: WFL                 Precautions: HTN, h/o cancer       Manuals 4/30       L Shoulder all planes to chey                                Neuro Re-Ed         MTP/LTP        Ball Roll on Wall        Ajith ER w/TBand                                        Ther Ex        UBE Alt for UE Strength        Isometrics        Tband IR/ER        Pulleys   Flex/Scap        Table Slides  Flex/Scap Flex 5\"x10       Shrugs 10x       Scap Retractions 3\"x10       Supine Punch        Cane Flex/Abd/ER Cane Flex   Seated 10x       S/L ER & Abd        UT " "Stretch 15-20\"x5                                       Ther Activity                        Gait Training                        Modalities                           Access Code: [email or web address]  URL: [email or web address]  Date: 04/30/2025  Prepared by: Zunilda    Exercises  - Seated Shoulder Shrugs  - 1 x daily - 7 x weekly - 2 sets - 10 reps  - Seated Scapular Retraction  - 1 x daily - 7 x weekly - 2 sets - 10 reps  - Seated Shoulder Flexion Towel Slide at Table Top  - 1 x daily - 7 x weekly - 1 sets - 10 reps - 5-10\" hold  - Seated Shoulder Flexion AAROM with Dowel  - 1 x daily - 7 x weekly - 1 sets - 10 reps - 5\" hold  - Seated Upper Trapezius Stretch  - 1 x daily - 7 x weekly - 1 sets - 5 reps - 20\" hold                         "

## 2025-05-05 ENCOUNTER — OFFICE VISIT (OUTPATIENT)
Dept: PHYSICAL THERAPY | Facility: CLINIC | Age: 85
End: 2025-05-05
Attending: INTERNAL MEDICINE
Payer: COMMERCIAL

## 2025-05-05 DIAGNOSIS — M25.512 ACUTE PAIN OF LEFT SHOULDER: Primary | ICD-10-CM

## 2025-05-05 PROCEDURE — 97140 MANUAL THERAPY 1/> REGIONS: CPT | Performed by: PHYSICAL THERAPIST

## 2025-05-05 PROCEDURE — 97112 NEUROMUSCULAR REEDUCATION: CPT | Performed by: PHYSICAL THERAPIST

## 2025-05-05 PROCEDURE — 97110 THERAPEUTIC EXERCISES: CPT | Performed by: PHYSICAL THERAPIST

## 2025-05-05 NOTE — PROGRESS NOTES
"Daily Note     Today's date: 2025  Patient name: Bettye Millan  : 1940  MRN: [id number]  Referring provider: Loc Weiss DO  Dx:   Encounter Diagnosis     ICD-10-CM    1. Acute pain of left shoulder  M25.512                      Subjective: Pt reports numbness in her right UE along the Ulnar nerve distribution      Objective: See treatment diary below      Assessment: Tolerated treatment well. Patient would benefit from continued PT      Plan: Progress treatment as tolerated.          Precautions: HTN, h/o cancer       Manuals  5      L Shoulder all planes to chey        MFR to scalenes  15'                      Neuro Re-Ed         Shoulder Ext   ADD     Rows L1        Rows L2        Rows L3        Ball on Wall        Ajith ER w/TBand        Scalene Stretch 30\" hold  3x      Ulnar Nerve Glides   30x                      Ther Ex        UBE Alt for UE Strength        Shoulder 6-way Iso        Tband IR/ER        Pulleys   Flex/Scap  3' ea      Table Slides  Flex/Scap Flex 5\"x10 10x5\" ea      Shrugs 10x 30x      Scap Retractions 2\" hold 3\"x10 30x      Supine Punch        Cane Flex/Abd/ER Cane Flex   Seated 10x 10x 5\" ea      S/L ER & Abd        UT Stretch 15-20\"x5 3x30\"      ER Pulse                                Ther Activity                        Gait Training                        Modalities                           Access Code: [email or web address]  URL: [email or web address]  Date: 2025  Prepared by: Zunilda    Exercises  - Seated Shoulder Shrugs  - 1 x daily - 7 x weekly - 2 sets - 10 reps  - Seated Scapular Retraction  - 1 x daily - 7 x weekly - 2 sets - 10 reps  - Seated Shoulder Flexion Towel Slide at Table Top  - 1 x daily - 7 x weekly - 1 sets - 10 reps - 5-10\" hold  - Seated Shoulder Flexion AAROM with Dowel  - 1 x daily - 7 x weekly - 1 sets - 10 reps - 5\" hold  - Seated Upper Trapezius Stretch  - 1 x daily - 7 x weekly - 1 sets - 5 reps - 20\" hold         "

## 2025-05-07 ENCOUNTER — OFFICE VISIT (OUTPATIENT)
Dept: PHYSICAL THERAPY | Facility: CLINIC | Age: 85
End: 2025-05-07
Attending: INTERNAL MEDICINE
Payer: COMMERCIAL

## 2025-05-07 DIAGNOSIS — M25.512 ACUTE PAIN OF LEFT SHOULDER: Primary | ICD-10-CM

## 2025-05-07 PROCEDURE — 97110 THERAPEUTIC EXERCISES: CPT

## 2025-05-07 PROCEDURE — 97140 MANUAL THERAPY 1/> REGIONS: CPT

## 2025-05-07 NOTE — PROGRESS NOTES
"Daily Note     Today's date: 2025  Patient name: Bettye Millan  : 1940  MRN: [id number]  Referring provider: Loc Weiss DO  Dx:   Encounter Diagnosis     ICD-10-CM    1. Acute pain of left shoulder  M25.512           Start Time: 1409  Stop Time: 1450  Total time in clinic (min): 41 minutes    Subjective: Bettye reported that she had less \"zzz-ing\" in her left hand following her last PT visit. She had to apply some topical analgesics following treatment to reduce her soreness.       Objective: See treatment diary below      Assessment: Bettye tolerated treatment well. She noted numbness in her 4th/5th digits in the LUE during pulleys and table slides, and she was advised to not push herself as far to prevent further symptom exacerbation. Demonstrations and cues were necessary throughout the visit to ensure appropriate execution and assist with exercise recall. Scalene stretching was held secondary to complaints of of pain in the posterior aspect of the neck. Bettye would benefit from continued PT to improve her left shoulder mobility and minimize her symptoms during household activities.       Plan: Continue per plan of care.      Precautions: HTN, h/o cancer       Manuals  5/ 05/07     L Shoulder all planes to chey        MFR to scalenes  15' 12 min                     Neuro Re-Ed         Shoulder Ext   2x10  Green     Rows L1        Rows L2        Rows L3        Ball on Wall        Ajith ER w/TBand        Scalene Stretch 30\" hold  3x HELD     Ulnar Nerve Glides   30x 30x             Ther Ex        UBE Alt for UE Strength        Shoulder 6-way Iso        Tband IR/ER        Pulleys   Flex/Scap  3' ea 3 min each     Table Slides  Flex/Scap Flex 5\"x10 10x5\" ea 10x5\" each     Shrugs 10x 30x 30x     Scap Retractions 2\" hold 3\"x10 30x 30x     Supine Punch        Cane Flex/Abd/ER Cane Flex   Seated 10x 10x 5\" ea 10x5\" each     S/L ER & Abd        UT Stretch 15-20\"x5 3x30\" 3x30\" each     ER Pulse             " "                   Ther Activity                        Gait Training                        Modalities                           Access Code: [email or web address]  URL: [email or web address]  Date: 04/30/2025  Prepared by: Zunilda    Exercises  - Seated Shoulder Shrugs  - 1 x daily - 7 x weekly - 2 sets - 10 reps  - Seated Scapular Retraction  - 1 x daily - 7 x weekly - 2 sets - 10 reps  - Seated Shoulder Flexion Towel Slide at Table Top  - 1 x daily - 7 x weekly - 1 sets - 10 reps - 5-10\" hold  - Seated Shoulder Flexion AAROM with Dowel  - 1 x daily - 7 x weekly - 1 sets - 10 reps - 5\" hold  - Seated Upper Trapezius Stretch  - 1 x daily - 7 x weekly - 1 sets - 5 reps - 20\" hold           "

## 2025-05-12 ENCOUNTER — OFFICE VISIT (OUTPATIENT)
Dept: PHYSICAL THERAPY | Facility: CLINIC | Age: 85
End: 2025-05-12
Attending: INTERNAL MEDICINE
Payer: COMMERCIAL

## 2025-05-12 DIAGNOSIS — M25.512 ACUTE PAIN OF LEFT SHOULDER: Primary | ICD-10-CM

## 2025-05-12 PROCEDURE — 97140 MANUAL THERAPY 1/> REGIONS: CPT

## 2025-05-12 PROCEDURE — 97110 THERAPEUTIC EXERCISES: CPT

## 2025-05-12 NOTE — PROGRESS NOTES
"Daily Note     Today's date: 2025  Patient name: Bettye Millan  : 1940  MRN: [id number]  Referring provider: Loc Weiss DO  Dx:   Encounter Diagnosis     ICD-10-CM    1. Acute pain of left shoulder  M25.512           Start Time: 1400  Stop Time: 1445  Total time in clinic (min): 45 minutes    Subjective: Pt reports that she had some  \"zzz-ing\" up LUE post gardening this weekend but it has improved.       Objective: See treatment diary below      Assessment: Pt tolerated treatment well. Verbal and tactile cues required for pt to perform exercises correctly. Education on HEP and DOMS provided. Pt would benefit from continued PT to improve her left shoulder mobility and minimize her symptoms during household activities.       Plan: Continue per plan of care.      Precautions: HTN, h/o cancer       Manuals  5    L Shoulder all planes to chey        MFR to scalenes  15' 12 min 12 min                    Neuro Re-Ed         Shoulder Ext   2x10  Green 2x10  Green    Rows L1        Rows L2        Rows L3        Ball on Wall        Ajith ER w/TBand        Scalene Stretch 30\" hold  3x HELD     Ulnar Nerve Glides   30x 30x 30x            Ther Ex        UBE Alt for UE Strength        Shoulder 6-way Iso        Tband IR/ER        Pulleys   Flex/Scap  3' ea 3 min each 3 min each    Table Slides  Flex/Scap Flex 5\"x10 10x5\" ea 10x5\" each 10x5\" each    Shrugs 10x 30x 30x 30x    Scap Retractions 2\" hold 3\"x10 30x 30x 30x    Supine Punch        Cane Flex/Abd/ER Cane Flex   Seated 10x 10x 5\" ea 10x5\" each 10x5\" each    S/L ER & Abd        UT Stretch 15-20\"x5 3x30\" 3x30\" each 3x30\" each    ER Pulse                                Ther Activity                        Gait Training                        Modalities                           Access Code: [email or web address]  URL: [email or web address]  Date: 2025  Prepared by: Zunilda    Exercises  - Seated Shoulder Shrugs  - 1 x daily - 7 x weekly - 2 " "sets - 10 reps  - Seated Scapular Retraction  - 1 x daily - 7 x weekly - 2 sets - 10 reps  - Seated Shoulder Flexion Towel Slide at Table Top  - 1 x daily - 7 x weekly - 1 sets - 10 reps - 5-10\" hold  - Seated Shoulder Flexion AAROM with Dowel  - 1 x daily - 7 x weekly - 1 sets - 10 reps - 5\" hold  - Seated Upper Trapezius Stretch  - 1 x daily - 7 x weekly - 1 sets - 5 reps - 20\" hold           "

## 2025-05-14 ENCOUNTER — OFFICE VISIT (OUTPATIENT)
Dept: PHYSICAL THERAPY | Facility: CLINIC | Age: 85
End: 2025-05-14
Attending: INTERNAL MEDICINE
Payer: COMMERCIAL

## 2025-05-14 DIAGNOSIS — M25.512 ACUTE PAIN OF LEFT SHOULDER: Primary | ICD-10-CM

## 2025-05-14 PROCEDURE — 97110 THERAPEUTIC EXERCISES: CPT

## 2025-05-14 PROCEDURE — 97140 MANUAL THERAPY 1/> REGIONS: CPT

## 2025-05-14 NOTE — PROGRESS NOTES
"Daily Note     Today's date: 2025  Patient name: Bettye Millan  : 1940  MRN: [id number]  Referring provider: Loc Weiss DO  Dx:   Encounter Diagnosis     ICD-10-CM    1. Acute pain of left shoulder  M25.512           Start Time: 1400  Stop Time: 1445  Total time in clinic (min): 45 minutes    Subjective: Pt reports continued \"zizzing' despite noticeable improvement.       Objective: See treatment diary below      Assessment: Pt tolerated treatment well. Verbal and tactile cues required for pt to perform exercises correctly. Increased symptoms with UT stretch this visit. Pt would benefit from continued PT to improve her left shoulder mobility and minimize her symptoms during household activities.       Plan: Continue per plan of care.      Precautions: HTN, h/o cancer       Manuals    L Shoulder all planes to chey        MFR to scalenes  15' 12 min 12 min 12 min                   Neuro Re-Ed         Shoulder Ext   2x10  Green 2x10  Green 2x10  Green   Rows L1     20x   Rows L2        Rows L3        Ball on Wall        Ajith ER w/TBand        Scalene Stretch 30\" hold  3x HELD     Ulnar Nerve Glides   30x 30x 30x 30x           Ther Ex        UBE Alt for UE Strength        Shoulder 6-way Iso        Tband IR/ER        Pulleys   Flex/Scap  3' ea 3 min each 3 min each 3 min each   Table Slides  Flex/Scap Flex 5\"x10 10x5\" ea 10x5\" each 10x5\" each 10x5\" each   Shrugs 10x 30x 30x 30x 30x   Scap Retractions 2\" hold 3\"x10 30x 30x 30x 30x   Supine Punch        Cane Flex/Abd/ER Cane Flex   Seated 10x 10x 5\" ea 10x5\" each 10x5\" each 10x5\" each   S/L ER & Abd        UT Stretch 15-20\"x5 3x30\" 3x30\" each 3x30\" each 3x30\" each   ER Pulse                                Ther Activity                        Gait Training                        Modalities                           Access Code: [email or web address]  URL: [email or web address]  Date: 2025  Prepared by: Zunilda Dee  - " "Seated Shoulder Shrugs  - 1 x daily - 7 x weekly - 2 sets - 10 reps  - Seated Scapular Retraction  - 1 x daily - 7 x weekly - 2 sets - 10 reps  - Seated Shoulder Flexion Towel Slide at Table Top  - 1 x daily - 7 x weekly - 1 sets - 10 reps - 5-10\" hold  - Seated Shoulder Flexion AAROM with Dowel  - 1 x daily - 7 x weekly - 1 sets - 10 reps - 5\" hold  - Seated Upper Trapezius Stretch  - 1 x daily - 7 x weekly - 1 sets - 5 reps - 20\" hold           "

## 2025-05-19 ENCOUNTER — OFFICE VISIT (OUTPATIENT)
Dept: PHYSICAL THERAPY | Facility: CLINIC | Age: 85
End: 2025-05-19
Attending: INTERNAL MEDICINE
Payer: COMMERCIAL

## 2025-05-19 DIAGNOSIS — M25.512 ACUTE PAIN OF LEFT SHOULDER: Primary | ICD-10-CM

## 2025-05-19 PROCEDURE — 97112 NEUROMUSCULAR REEDUCATION: CPT

## 2025-05-19 PROCEDURE — 97110 THERAPEUTIC EXERCISES: CPT

## 2025-05-19 PROCEDURE — 97140 MANUAL THERAPY 1/> REGIONS: CPT

## 2025-05-19 NOTE — PROGRESS NOTES
"Daily Note     Today's date: 2025  Patient name: Bettye Millan  : 1940  MRN: [id number]  Referring provider: Loc Weiss DO  Dx:   Encounter Diagnosis     ICD-10-CM    1. Acute pain of left shoulder  M25.512           Start Time: 1400  Stop Time: 1455  Total time in clinic (min): 55 minutes    Subjective: Pt reports decreased \"zizzing.\"       Objective: See treatment diary below      Assessment: Pt tolerated treatment well. Verbal and tactile cues required for pt to perform exercises correctly. Added L2 rows this visit. Pt would benefit from continued PT to improve her left shoulder mobility and minimize her symptoms during household activities.       Plan: Continue per plan of care.      Precautions: HTN, h/o cancer       Manuals    L Shoulder all planes to chey        MFR to scalenes 12 min 15' 12 min 12 min 12 min                   Neuro Re-Ed         Shoulder Ext 2x10  Green  2x10  Green 2x10  Green 2x10  Green   Rows L1 2x10  Green    20x   Rows L2 2x10  Green       Rows L3        Ball on Wall        Ajith ER w/TBand        Scalene Stretch 30\" hold 3x  3x HELD     Ulnar Nerve Glides  30x 30x 30x 30x 30x           Ther Ex        UBE Alt for UE Strength        Shoulder 6-way Iso        Tband IR/ER        Pulleys   Flex/Scap 3' ea 3' ea 3 min each 3 min each 3 min each   Table Slides  Flex/Scap Flex 5\"x10 10x5\" ea 10x5\" each 10x5\" each 10x5\" each   Shrugs 30x 30x 30x 30x 30x   Scap Retractions 2\" hold 30x 30x 30x 30x 30x   Supine Punch        Cane Flex/Abd/ER 10x 5\" ea 10x 5\" ea 10x5\" each 10x5\" each 10x5\" each   S/L ER & Abd        UT Stretch 15-20\"x5 3x30\" 3x30\" each 3x30\" each 3x30\" each   ER Pulse                                Ther Activity                        Gait Training                        Modalities                           Access Code: [email or web address]  URL: [email or web address]  Date: 2025  Prepared by: Zunilda    Exercises  - Seated Shoulder " "Shrugs  - 1 x daily - 7 x weekly - 2 sets - 10 reps  - Seated Scapular Retraction  - 1 x daily - 7 x weekly - 2 sets - 10 reps  - Seated Shoulder Flexion Towel Slide at Table Top  - 1 x daily - 7 x weekly - 1 sets - 10 reps - 5-10\" hold  - Seated Shoulder Flexion AAROM with Dowel  - 1 x daily - 7 x weekly - 1 sets - 10 reps - 5\" hold  - Seated Upper Trapezius Stretch  - 1 x daily - 7 x weekly - 1 sets - 5 reps - 20\" hold           "

## 2025-05-21 ENCOUNTER — OFFICE VISIT (OUTPATIENT)
Dept: PHYSICAL THERAPY | Facility: CLINIC | Age: 85
End: 2025-05-21
Attending: INTERNAL MEDICINE
Payer: COMMERCIAL

## 2025-05-21 DIAGNOSIS — M25.512 ACUTE PAIN OF LEFT SHOULDER: Primary | ICD-10-CM

## 2025-05-21 PROCEDURE — 97110 THERAPEUTIC EXERCISES: CPT

## 2025-05-21 PROCEDURE — 97140 MANUAL THERAPY 1/> REGIONS: CPT

## 2025-05-21 PROCEDURE — 97112 NEUROMUSCULAR REEDUCATION: CPT

## 2025-05-21 NOTE — PROGRESS NOTES
"Daily Note     Today's date: 2025  Patient name: Bettye Millan  : 1940  MRN: [id number]  Referring provider: Loc Weiss DO  Dx:   Encounter Diagnosis     ICD-10-CM    1. Acute pain of left shoulder  M25.512           Start Time: 1405  Stop Time: 1500  Total time in clinic (min): 55 minutes    Subjective: Pt reports improvement with exercises.       Objective: See treatment diary below      Assessment: Pt tolerated treatment well. Verbal and tactile cues required for pt to perform exercises correctly. Added ball circles this visit. Pt would benefit from continued PT to improve her left shoulder mobility and minimize her symptoms during household activities.       Plan: Continue per plan of care.      Precautions: HTN, h/o cancer       Manuals    L Shoulder all planes to chey        MFR to scalenes 12 min 15' 12 min 12 min 12 min                   Neuro Re-Ed         Shoulder Ext 2x10  Green 2x10  Green 2x10  Green 2x10  Green 2x10  Green   Rows L1 2x10  Green 2x10  Green   20x   Rows L2 2x10  Green 2x10  Green      Rows L3        Ball on Wall  30x CW/CC      Ajith ER w/TBand        Scalene Stretch 30\" hold 3x  3x HELD     Ulnar Nerve Glides  30x 30x 30x 30x 30x           Ther Ex        UBE Alt for UE Strength        Shoulder 6-way Iso        Tband IR/ER        Pulleys   Flex/Scap 3' ea 3' ea 3 min each 3 min each 3 min each   Table Slides  Flex/Scap Flex 5\"x10 10x5\" ea 10x5\" each 10x5\" each 10x5\" each   Shrugs 30x 30x 30x 30x 30x   Scap Retractions 2\" hold 30x 30x 30x 30x 30x   Supine Punch        Cane Flex/Abd/ER 10x 5\" ea 10x 5\" ea 10x5\" each 10x5\" each 10x5\" each   S/L ER & Abd        UT Stretch 15-20\"x5 3x30\" 3x30\" each 3x30\" each 3x30\" each   ER Pulse                                Ther Activity                        Gait Training                        Modalities                           Access Code: [email or web address]  URL: [email or web address]  Date: " "04/30/2025  Prepared by: Zunilda    Exercises  - Seated Shoulder Shrugs  - 1 x daily - 7 x weekly - 2 sets - 10 reps  - Seated Scapular Retraction  - 1 x daily - 7 x weekly - 2 sets - 10 reps  - Seated Shoulder Flexion Towel Slide at Table Top  - 1 x daily - 7 x weekly - 1 sets - 10 reps - 5-10\" hold  - Seated Shoulder Flexion AAROM with Dowel  - 1 x daily - 7 x weekly - 1 sets - 10 reps - 5\" hold  - Seated Upper Trapezius Stretch  - 1 x daily - 7 x weekly - 1 sets - 5 reps - 20\" hold           "

## 2025-05-27 ENCOUNTER — APPOINTMENT (OUTPATIENT)
Dept: PHYSICAL THERAPY | Facility: CLINIC | Age: 85
End: 2025-05-27
Attending: INTERNAL MEDICINE
Payer: COMMERCIAL

## 2025-05-28 ENCOUNTER — OFFICE VISIT (OUTPATIENT)
Dept: PHYSICAL THERAPY | Facility: CLINIC | Age: 85
End: 2025-05-28
Attending: INTERNAL MEDICINE
Payer: COMMERCIAL

## 2025-05-28 DIAGNOSIS — M25.512 ACUTE PAIN OF LEFT SHOULDER: Primary | ICD-10-CM

## 2025-05-28 PROCEDURE — 97140 MANUAL THERAPY 1/> REGIONS: CPT | Performed by: PHYSICAL THERAPIST

## 2025-05-28 PROCEDURE — 97112 NEUROMUSCULAR REEDUCATION: CPT | Performed by: PHYSICAL THERAPIST

## 2025-05-28 PROCEDURE — 97110 THERAPEUTIC EXERCISES: CPT | Performed by: PHYSICAL THERAPIST

## 2025-05-28 NOTE — PROGRESS NOTES
"Daily Note     Today's date: 2025  Patient name: Bettye Millan  : 1940  MRN: [id number]  Referring provider: Loc Weiss DO  Dx:   Encounter Diagnosis     ICD-10-CM    1. Acute pain of left shoulder  M25.512                      Subjective: patient reports significant decreases in pain down to a 3/10       Objective: See treatment diary below      Assessment: Tolerated treatment fair. Patient was able to complete UBE in the forward direction without pain but complained of pain at top of shoulder. Patient complained of increasing distal symptoms traction was attempted on patient which centralized symptoms, patient was positive for empty can without overpressure.        Plan: Progress treatment as tolerated.  Add mechanical traction      Precautions: HTN, h/o cancer       Manuals    L Shoulder all planes to chey        MFR to scalenes 12 min 15'   12 min   Distraction    10'             Neuro Re-Ed         Shoulder Ext 2x10  Green 2x10  Green 15x  Green   2x10  Green   Rows L1 2x10  Green 2x10  Green 15x  Green   20x   Rows L2 2x10  Green 2x10  Green 15x  Green      Rows L3        Ball on Wall  30x CW/CC 30x CW/CC     Ajith ER w/TBand        Scalene Stretch 30\" hold 3x  3x 3x     Ulnar Nerve Glides  30x 30x 20x  30x           Ther Ex        UBE Alt for UE Strength   60 ALT 4min     Shoulder 6-way Iso        Tband IR/ER        Pulleys   Flex/Scap 3' ea 3' ea   3 min each   Table Slides  Flex/Scap Flex 5\"x10 10x5\" ea   10x5\" each   Shrugs 30x 30x 30x  30x   Scap Retractions 2\" hold 30x 30x 30x  30x   Supine Punch   15x     Cane Flex/Abd/ER 10x 5\" ea 10x 5\" ea 10x 5\" ea  10x5\" each   S/L ER & Abd        UT Stretch 15-20\"x5 3x30\" 3x30\"  3x30\" each   ER Pulse                                Ther Activity                        Gait Training                        Modalities                           Access Code: [email or web address]  URL: [email or web address]  Date: 2025  Prepared " "by: Zunilda    Exercises  - Seated Shoulder Shrugs  - 1 x daily - 7 x weekly - 2 sets - 10 reps  - Seated Scapular Retraction  - 1 x daily - 7 x weekly - 2 sets - 10 reps  - Seated Shoulder Flexion Towel Slide at Table Top  - 1 x daily - 7 x weekly - 1 sets - 10 reps - 5-10\" hold  - Seated Shoulder Flexion AAROM with Dowel  - 1 x daily - 7 x weekly - 1 sets - 10 reps - 5\" hold  - Seated Upper Trapezius Stretch  - 1 x daily - 7 x weekly - 1 sets - 5 reps - 20\" hold             "

## 2025-05-29 ENCOUNTER — OFFICE VISIT (OUTPATIENT)
Dept: PHYSICAL THERAPY | Facility: CLINIC | Age: 85
End: 2025-05-29
Attending: INTERNAL MEDICINE
Payer: COMMERCIAL

## 2025-05-29 DIAGNOSIS — M25.512 ACUTE PAIN OF LEFT SHOULDER: Primary | ICD-10-CM

## 2025-05-29 PROCEDURE — 97110 THERAPEUTIC EXERCISES: CPT | Performed by: PHYSICAL THERAPIST

## 2025-05-29 PROCEDURE — 97112 NEUROMUSCULAR REEDUCATION: CPT | Performed by: PHYSICAL THERAPIST

## 2025-05-29 NOTE — PROGRESS NOTES
"Daily Note     Today's date: 2025  Patient name: Bettye Millan  : 1940  MRN: [id number]  Referring provider: Loc Weiss DO  Dx: No diagnosis found.               Subjective: patient reports increased pain after UBE last session along with increased neuropathic pain.      Objective: See treatment diary below      Assessment: Tolerated treatment well. Patient became dizzy laying in supine on mechanical traction when re-attempting later head strap was not used per patient request without complaints of dizziness.       Plan: Continue per plan of care.      Precautions: HTN, h/o cancer       Manuals    L Shoulder all planes to chey        MFR to scalenes 12 min 15'   12 min   Distraction    10'             Neuro Re-Ed         Shoulder Ext 2x10  Green 2x10  Green 15x  Green  15x   Green  2x10  Green   Rows L1 2x10  Green 2x10  Green 15x  Green  15x  Green  20x   Rows L2 2x10  Green 2x10  Green 15x  Green  15x   Green     Rows L3        Ball on Wall  30x CW/CC 30x CW/CC 30x CW/CC    Ajith ER w/TBand        Scalene Stretch 30\" hold 3x  3x 3x HOLD    Ulnar Nerve Glides  30x 30x 20x HELD 30x           Ther Ex        UBE Alt for UE Strength   60 ALT 4min D/C    Shoulder 6-way Iso        Tband IR/ER        Pulleys   Flex/Scap 3' ea 3' ea  3' ea 3 min each   Table Slides  Flex/Scap Flex 5\"x10 10x5\" ea   10x5\" each   Shrugs 30x 30x 30x 30x 30x   Scap Retractions 2\" hold 30x 30x 30x 30x 30x   Supine Punch   15x 20x    Cane Flex/Abd/ER 10x 5\" ea 10x 5\" ea 10x 5\" ea 10x5\" 10x5\" each   S/L ER & Abd        UT Stretch 15-20\"x5 3x30\" 3x30\" 3x 30\" 3x30\" each   ER Pulse                                Ther Activity                        Gait Training                        Modalities        Mechanical traction     15'  8lb max-3lbmin  Seat back raised                Access Code: [email or web address]  URL: [email or web address]  Date: 2025  Prepared by: Zunilda    Exercises  - Seated Shoulder " "Shrugs  - 1 x daily - 7 x weekly - 2 sets - 10 reps  - Seated Scapular Retraction  - 1 x daily - 7 x weekly - 2 sets - 10 reps  - Seated Shoulder Flexion Towel Slide at Table Top  - 1 x daily - 7 x weekly - 1 sets - 10 reps - 5-10\" hold  - Seated Shoulder Flexion AAROM with Dowel  - 1 x daily - 7 x weekly - 1 sets - 10 reps - 5\" hold  - Seated Upper Trapezius Stretch  - 1 x daily - 7 x weekly - 1 sets - 5 reps - 20\" hold               "

## 2025-06-02 ENCOUNTER — EVALUATION (OUTPATIENT)
Dept: PHYSICAL THERAPY | Facility: CLINIC | Age: 85
End: 2025-06-02
Attending: INTERNAL MEDICINE
Payer: COMMERCIAL

## 2025-06-02 DIAGNOSIS — M25.512 ACUTE PAIN OF LEFT SHOULDER: Primary | ICD-10-CM

## 2025-06-02 PROCEDURE — 97012 MECHANICAL TRACTION THERAPY: CPT | Performed by: PHYSICAL THERAPIST

## 2025-06-02 PROCEDURE — 97140 MANUAL THERAPY 1/> REGIONS: CPT | Performed by: PHYSICAL THERAPIST

## 2025-06-02 NOTE — PROGRESS NOTES
PT Re-Evaluation     Today's date: 2025  Patient name: Bettye Millan  : 1940  MRN: [id number]  Referring provider: Loc Weiss DO  Dx:   Encounter Diagnosis     ICD-10-CM    1. Acute pain of left shoulder  M25.512                        Assessment  Impairments: abnormal or restricted ROM, activity intolerance, impaired physical strength, lacks appropriate home exercise program, pain with function, poor posture , poor body mechanics and activity limitations  Other impairment: decreased flexibility    Assessment details: The patient is an 84 y/o female who presents to OPPT with diagnosis of acute L shoulder pain.  She has complaints of constant pain.  Pain is along the top of her shoulder, towards her neck and down into her shoulder blade and anterior axilla. Along with decreased cervical ROM with neuropathic pain on the left side when extending neck. She demonstrates deficits with decreased A/PROM and strength, decreased flexibility, decreased postural awareness and TTP.  These deficits lead to difficulty sleeping, difficulty with reaching, lifting and carrying and decreased tolerance to functional activities especially in the sagittal plane.  She has difficulty with reaching, lifting items and carrying items.  She notes pain and difficulty with daily activities such as washing her hair, dressing, driving and reaching into cabinets.  She reports difficulty sleeping as she is unable to lay on her L side to sleep.  Secondary to pain and above deficits she is limited with her overall mobility and function.  The patient would benefit from continued PT to address deficits and improve function.  Tx to include ROM, stretching, strengthening, modalities, HEP, pt education, postural ed, lifting/body mechanics, neuro re-ed, balance/proprioception Te, MT and equipment.      Understanding of Dx/Px/POC: good     Prognosis: good    Goals  STGs:  1.  Initiate and complete HEP with verbal cues. - Partially MET   2.   Decrease L shoulder pain by > 25% in 4 weeks. - Partially MET  3.  Improve L shoulder strength by 1 grade in 4 weeks. - NOT MET  4.  Improve L shoulder ROM by 15-20 degrees in 4 weeks. - NOT MET   LTGs:  1.  Patient to be I with HEP in 12 weeks.  2.  Improve L shoulder ROM to WFL t/o in 12 weeks to improve function.  3.  Improve L UE strength to > or = to 4/5 t/o in 12 weeks to improve function.  4.  Decrease L shoulder pain to < or = to 2-3/10 with activity in 12 weeks to improve function.    5.  Recreational performance in related activities is improved to PLOF in 12 weeks.  6.  Patient to report improved sleep in 12 weeks. - Partially MET  7.  FOTO outcomes to meet or exceed expectations in 12 weeks.  8.  ADL performance is improved to PLOF in 12 weeks.            Plan  Patient would benefit from: skilled physical therapy  Planned modality interventions: traction    Planned therapy interventions: IASTM, body mechanics training, manual therapy, neuromuscular re-education, patient/caregiver education, postural training, self care, strengthening, therapeutic activities, therapeutic exercise, stretching and home exercise program    Frequency: 2x week  Duration in weeks: 5  Treatment plan discussed with: patient  Plan details: Modalities and therapy interventions prn.      Subjective Evaluation    History of Present Illness  Mechanism of injury: The patient states that she developed L shoulder pain a few months ago.  She notes that she would get pain when she would use it but did not seek treatment for it.       She had seen her PCP on 4/24/25 for her annual wellness visit, told him about her shoulder pain.  No imaging was completed.  She was referred to OPPT and she now presents for her evaluation.  She will be going back to see the doctor as needed for her follow up appointment.  She is to see how PT goes and call the doctor if no change with therapy.        She has pain with driving, reaching and lifting.  The  "patient is constant.    Pain is along the top of the shoulder and can radiate over towards her neck.  Pain can also radiate into her shoulder blade.  She denies any radiating arm pain, denies any N&T into LUE.    Difficulty sleeping noted, unable to lay on her L side.    Patient Goals  Patient goals for therapy: increased motion, decreased pain and increased strength  Patient goal: \"To not have the pain in my shoulder.\"  Pain  Current pain ratin  At best pain ratin  At worst pain ratin  Location: L Shoulder - top of shoulder, to neck, into shoulder blade  Quality: burning, dull ache, discomfort and radiating  Relieving factors: heat and rest (Biofreeze, hot shower)  Aggravating factors: lifting (Driving, reaching, carrying, saggital plane activity)  Progression: improved    Social Support  Lives with: spouse    Employment status: not working  Hand dominance: right      Objective     Static Posture     Head  Forward.    Shoulders  Rounded.    Postural Observations  Seated posture: fair      Palpation   Left   No palpable tenderness to the rhomboids.   Tenderness of the levator scapulae and upper trapezius.     Right   No palpable tenderness to the levator scapulae, rhomboids and upper trapezius.     Tenderness     Left Shoulder   Tenderness in the AC joint.     Neurological Testing     Sensation     Shoulder   Left Shoulder   Intact: light touch    Right Shoulder   Intact: Light touch    Active Range of Motion   Cervical/Thoracic Spine       Cervical    Flexion: 15 degrees   Extension: 10 (peripheralizes) degrees      Left lateral flexion: 13 degrees      Right lateral flexion: 20 degrees      Left Shoulder   Flexion: 104 degrees with pain  Abduction: 85 degrees with pain  External rotation 45°: 45 degrees with pain  Internal rotation 45°: 55 degrees     Right Shoulder   Flexion: 170 degrees   Abduction: 170 degrees   External rotation 90°: 80 degrees  Internal rotation 90°: 55 degrees     Left Elbow " "  Flexion: WFL  Extension: WFL    Right Elbow   Flexion: WFL  Extension: WFL    Passive Range of Motion   Left Shoulder   Flexion: 120 degrees with pain  Abduction: 95 degrees with pain  External rotation 45°: 50 degrees with pain  Internal rotation 45°: 55 degrees with pain  Mechanical Assessment    Cervical    Seated Extension:   Pain location: peripheralized    Thoracic      Lumbar      Strength/Myotome Testing     Left Shoulder     Planes of Motion   Flexion: 2+   Abduction: 2+   External rotation at 45°: 3   Internal rotation at 0°: 4     Right Shoulder     Planes of Motion   Flexion: WFL   Abduction: WFL   External rotation at 0°: WFL   Internal rotation at 0°: WFL                 Precautions: HTN, h/o cancer       Manuals 5/19 5/21 5/28 5/29 6/2   L Shoulder all planes to chey            MFR to scalenes 12 min 15'        Distraction      10'                   Neuro Re-Ed             Shoulder Ext 2x10  Green 2x10  Green 15x  Green  15x   Green     Rows L1 2x10  Green 2x10  Green 15x  Green  15x  Green     Rows L2 2x10  Green 2x10  Green 15x  Green  15x   Green     Rows L3            Ball on Wall   30x CW/CC 30x CW/CC 30x CW/CC    Ajith ER w/TBand            Scalene Stretch 30\" hold 3x  3x 3x HOLD    Ulnar Nerve Glides  30x 30x 20x HELD                 Ther Ex            UBE Alt for UE Strength     60 ALT 4min D/C    Shoulder 6-way Iso            Tband IR/ER            Pulleys   Flex/Scap 3' ea 3' ea   3' ea 3' ea   Table Slides  Flex/Scap Flex 5\"x10 10x5\" ea        Shrugs 30x 30x 30x 30x    Scap Retractions 2\" hold 30x 30x 30x 30x    Supine Punch     15x 20x    Cane Flex/Abd/ER 10x 5\" ea 10x 5\" ea 10x 5\" ea 10x5\"    S/L ER & Abd            UT Stretch 15-20\"x5 3x30\" 3x30\" 3x 30\"    ER Pulse                                                   Ther Activity                                      Gait Training                                      Modalities            Mechanical traction        15'  8lb max-3lbmin  Seat " "back raised  10'  11lb max-5lbmin  Seat back raised                    Access Code: [email or web address]  URL: [email or web address]  Date: 04/30/2025  Prepared by: Zunilda    Exercises  - Seated Shoulder Shrugs  - 1 x daily - 7 x weekly - 2 sets - 10 reps  - Seated Scapular Retraction  - 1 x daily - 7 x weekly - 2 sets - 10 reps  - Seated Shoulder Flexion Towel Slide at Table Top  - 1 x daily - 7 x weekly - 1 sets - 10 reps - 5-10\" hold  - Seated Shoulder Flexion AAROM with Dowel  - 1 x daily - 7 x weekly - 1 sets - 10 reps - 5\" hold  - Seated Upper Trapezius Stretch  - 1 x daily - 7 x weekly - 1 sets - 5 reps - 20\" hold         "

## 2025-06-02 NOTE — LETTER
Kasie 3, 2025    Loc Weiss DO  [address] [facility] [address].  [address]  [facility] [address]    Patient: Bettye Millan   YOB: 1940   Date of Visit: 2025     Encounter Diagnosis     ICD-10-CM    1. Acute pain of left shoulder  M25.512           Dear Dr. Loc Weiss DO:    Thank you for your recent referral of Bettye Millan. Please review the attached evaluation summary from Bettye's recent visit.     Please verify that you agree with the plan of care by signing the attached order.     If you have any questions or concerns, please do not hesitate to call.     I sincerely appreciate the opportunity to share in the care of one of your patients and hope to have another opportunity to work with you in the near future.       Sincerely,    Rudy So      Referring Provider:      I certify that I have read the below Plan of Care and certify the need for these services furnished under this plan of treatment while under my care.                    Loc Weiss DO  [address] [facility] [address].  [address]  [facility] [address]  Via Fax: [phone]          PT Re-Evaluation     Today's date: 2025  Patient name: Bettye Millan  : 1940  MRN: [id number]  Referring provider: Loc Weiss DO  Dx:   Encounter Diagnosis     ICD-10-CM    1. Acute pain of left shoulder  M25.512                        Assessment  Impairments: abnormal or restricted ROM, activity intolerance, impaired physical strength, lacks appropriate home exercise program, pain with function, poor posture , poor body mechanics and activity limitations  Other impairment: decreased flexibility    Assessment details: The patient is an 86 y/o female who presents to OPPT with diagnosis of acute L shoulder pain.  She has complaints of constant pain.  Pain is along the top of her shoulder, towards her neck and down into her shoulder blade and anterior axilla. Along with decreased cervical ROM with neuropathic pain on the left side when extending neck.  She demonstrates deficits with decreased A/PROM and strength, decreased flexibility, decreased postural awareness and TTP.  These deficits lead to difficulty sleeping, difficulty with reaching, lifting and carrying and decreased tolerance to functional activities especially in the sagittal plane.  She has difficulty with reaching, lifting items and carrying items.  She notes pain and difficulty with daily activities such as washing her hair, dressing, driving and reaching into cabinets.  She reports difficulty sleeping as she is unable to lay on her L side to sleep.  Secondary to pain and above deficits she is limited with her overall mobility and function.  The patient would benefit from continued PT to address deficits and improve function.  Tx to include ROM, stretching, strengthening, modalities, HEP, pt education, postural ed, lifting/body mechanics, neuro re-ed, balance/proprioception Te, MT and equipment.      Understanding of Dx/Px/POC: good     Prognosis: good    Goals  STGs:  1.  Initiate and complete HEP with verbal cues. - Partially MET   2.  Decrease L shoulder pain by > 25% in 4 weeks. - Partially MET  3.  Improve L shoulder strength by 1 grade in 4 weeks. - NOT MET  4.  Improve L shoulder ROM by 15-20 degrees in 4 weeks. - NOT MET   LTGs:  1.  Patient to be I with HEP in 12 weeks.  2.  Improve L shoulder ROM to WFL t/o in 12 weeks to improve function.  3.  Improve L UE strength to > or = to 4/5 t/o in 12 weeks to improve function.  4.  Decrease L shoulder pain to < or = to 2-3/10 with activity in 12 weeks to improve function.    5.  Recreational performance in related activities is improved to PLOF in 12 weeks.  6.  Patient to report improved sleep in 12 weeks. - Partially MET  7.  FOTO outcomes to meet or exceed expectations in 12 weeks.  8.  ADL performance is improved to PLOF in 12 weeks.            Plan  Patient would benefit from: skilled physical therapy  Planned modality interventions:  "traction    Planned therapy interventions: IASTM, body mechanics training, manual therapy, neuromuscular re-education, patient/caregiver education, postural training, self care, strengthening, therapeutic activities, therapeutic exercise, stretching and home exercise program    Frequency: 2x week  Duration in weeks: 5  Treatment plan discussed with: patient  Plan details: Modalities and therapy interventions prn.      Subjective Evaluation    History of Present Illness  Mechanism of injury: The patient states that she developed L shoulder pain a few months ago.  She notes that she would get pain when she would use it but did not seek treatment for it.       She had seen her PCP on 25 for her annual wellness visit, told him about her shoulder pain.  No imaging was completed.  She was referred to OPPT and she now presents for her evaluation.  She will be going back to see the doctor as needed for her follow up appointment.  She is to see how PT goes and call the doctor if no change with therapy.        She has pain with driving, reaching and lifting.  The patient is constant.    Pain is along the top of the shoulder and can radiate over towards her neck.  Pain can also radiate into her shoulder blade.  She denies any radiating arm pain, denies any N&T into LUE.    Difficulty sleeping noted, unable to lay on her L side.    Patient Goals  Patient goals for therapy: increased motion, decreased pain and increased strength  Patient goal: \"To not have the pain in my shoulder.\"  Pain  Current pain ratin  At best pain ratin  At worst pain ratin  Location: L Shoulder - top of shoulder, to neck, into shoulder blade  Quality: burning, dull ache, discomfort and radiating  Relieving factors: heat and rest (Biofreeze, hot shower)  Aggravating factors: lifting (Driving, reaching, carrying, saggital plane activity)  Progression: improved    Social Support  Lives with: spouse    Employment status: not working  Hand " dominance: right      Objective     Static Posture     Head  Forward.    Shoulders  Rounded.    Postural Observations  Seated posture: fair      Palpation   Left   No palpable tenderness to the rhomboids.   Tenderness of the levator scapulae and upper trapezius.     Right   No palpable tenderness to the levator scapulae, rhomboids and upper trapezius.     Tenderness     Left Shoulder   Tenderness in the AC joint.     Neurological Testing     Sensation     Shoulder   Left Shoulder   Intact: light touch    Right Shoulder   Intact: Light touch    Active Range of Motion   Cervical/Thoracic Spine       Cervical    Flexion: 15 degrees   Extension: 10 (peripheralizes) degrees      Left lateral flexion: 13 degrees      Right lateral flexion: 20 degrees      Left Shoulder   Flexion: 104 degrees with pain  Abduction: 85 degrees with pain  External rotation 45°: 45 degrees with pain  Internal rotation 45°: 55 degrees     Right Shoulder   Flexion: 170 degrees   Abduction: 170 degrees   External rotation 90°: 80 degrees  Internal rotation 90°: 55 degrees     Left Elbow   Flexion: WFL  Extension: WFL    Right Elbow   Flexion: WFL  Extension: WFL    Passive Range of Motion   Left Shoulder   Flexion: 120 degrees with pain  Abduction: 95 degrees with pain  External rotation 45°: 50 degrees with pain  Internal rotation 45°: 55 degrees with pain  Mechanical Assessment    Cervical    Seated Extension:   Pain location: peripheralized    Thoracic      Lumbar      Strength/Myotome Testing     Left Shoulder     Planes of Motion   Flexion: 2+   Abduction: 2+   External rotation at 45°: 3   Internal rotation at 0°: 4     Right Shoulder     Planes of Motion   Flexion: WFL   Abduction: WFL   External rotation at 0°: WFL   Internal rotation at 0°: WFL                 Precautions: HTN, h/o cancer       Manuals 5/19 5/21 5/28 5/29 6/2   L Shoulder all planes to chey            MFR to alvin 12 min 15'        Distraction      10'                  "  Neuro Re-Ed             Shoulder Ext 2x10  Green 2x10  Green 15x  Green  15x   Green     Rows L1 2x10  Green 2x10  Green 15x  Green  15x  Green     Rows L2 2x10  Green 2x10  Green 15x  Green  15x   Green     Rows L3            Ball on Wall   30x CW/CC 30x CW/CC 30x CW/CC    Ajith ER w/TBand            Scalene Stretch 30\" hold 3x  3x 3x HOLD    Ulnar Nerve Glides  30x 30x 20x HELD                 Ther Ex            UBE Alt for UE Strength     60 ALT 4min D/C    Shoulder 6-way Iso            Tband IR/ER            Pulleys   Flex/Scap 3' ea 3' ea   3' ea 3' ea   Table Slides  Flex/Scap Flex 5\"x10 10x5\" ea        Shrugs 30x 30x 30x 30x    Scap Retractions 2\" hold 30x 30x 30x 30x    Supine Punch     15x 20x    Cane Flex/Abd/ER 10x 5\" ea 10x 5\" ea 10x 5\" ea 10x5\"    S/L ER & Abd            UT Stretch 15-20\"x5 3x30\" 3x30\" 3x 30\"    ER Pulse                                                   Ther Activity                                      Gait Training                                      Modalities            Mechanical traction        15'  8lb max-3lbmin  Seat back raised  10'  11lb max-5lbmin  Seat back raised                    Access Code: [email or web address]  URL: [email or web address]  Date: 04/30/2025  Prepared by: Zunilda    Exercises  - Seated Shoulder Shrugs  - 1 x daily - 7 x weekly - 2 sets - 10 reps  - Seated Scapular Retraction  - 1 x daily - 7 x weekly - 2 sets - 10 reps  - Seated Shoulder Flexion Towel Slide at Table Top  - 1 x daily - 7 x weekly - 1 sets - 10 reps - 5-10\" hold  - Seated Shoulder Flexion AAROM with Dowel  - 1 x daily - 7 x weekly - 1 sets - 10 reps - 5\" hold  - Seated Upper Trapezius Stretch  - 1 x daily - 7 x weekly - 1 sets - 5 reps - 20\" hold           Attestation signed by Abe Villegas PT at 6/2/2025  6:16 PM:  I supervised the visit.  We discussed the case to ensure appropriate continuation and progression of care and I reviewed the documentation.                   "

## 2025-06-04 ENCOUNTER — OFFICE VISIT (OUTPATIENT)
Dept: PHYSICAL THERAPY | Facility: CLINIC | Age: 85
End: 2025-06-04
Attending: INTERNAL MEDICINE
Payer: COMMERCIAL

## 2025-06-04 DIAGNOSIS — M25.512 ACUTE PAIN OF LEFT SHOULDER: Primary | ICD-10-CM

## 2025-06-04 PROCEDURE — 97012 MECHANICAL TRACTION THERAPY: CPT | Performed by: PHYSICAL THERAPIST

## 2025-06-04 PROCEDURE — 97140 MANUAL THERAPY 1/> REGIONS: CPT | Performed by: PHYSICAL THERAPIST

## 2025-06-04 NOTE — PROGRESS NOTES
"Daily Note     Today's date: 2025  Patient name: Bettye Millan  : 1940  MRN: [id number]  Referring provider: Loc Weiss DO  Dx:   Encounter Diagnosis     ICD-10-CM    1. Acute pain of left shoulder  M25.512                      Subjective: patient reports general improvement in pain and neuropathy with increased tolerance to dish washing       Objective: See treatment diary below      Assessment: Tolerated treatment well. Patient patient demonstrated increased irritability today with increased paraesthesia during upper trap stretch and doorway ER stretch, cane AAROM was held today due to high irritability, patient was able to complete wall ball circles using weighted ball although CCW motion still produces minor pain.       Plan: Continue per plan of care.         Precautions: HTN, h/o cancer       Manuals    L Shoulder all planes to chey           MFR to scalenes 10' 15'        Distraction     10'                  Neuro Re-Ed            Shoulder Ext 2x 10  Green 2x10  Green 15x  Green  15x   Green     Rows L1 2x10  Green 2x10  Green 15x  Green  15x  Green     Rows L2 2x10  Green 2x10  Green 15x  Green  15x   Green     Rows L3           Ball on Wall 30x CW  Green MB 30x CW/CC 30x CW/CC 30x CW/CC    Ajith ER w/TBand           Scalene Stretch 30\" hold  3x 3x HOLD    Ulnar Nerve Glides   30x 20x HELD                Ther Ex           UBE Alt for UE Strength    60 ALT 4min D/C    Shoulder 6-way Iso           Tband IR/ER           Pulleys   Flex/Scap 3' ea 3' ea   3' ea 3' ea   Table Slides  Flex/Scap  10x5\" ea        Shrugs 30x 30x 30x 30x    Scap Retractions 2\" hold 30x 30x 30x 30x    Supine Punch    15x 20x    Cane Flex/Abd/ER  10x 5\" ea 10x 5\" ea 10x5\"    S/L ER & Abd           UT Stretch 3x 3x30\" 3x30\" 3x 30\"    ER Pulse            ER door stretch  3x                                    Ther Activity                                                  Gait Training                    " "                              Modalities                Mechanical traction            15'  8lb max-3lbmin  Seat back raised   10'  11lb max-5lbmin  Seat back raised                       Access Code: [email or web address]  URL: [email or web address]  Date: 04/30/2025  Prepared by: Zunilda    Exercises  - Seated Shoulder Shrugs  - 1 x daily - 7 x weekly - 2 sets - 10 reps  - Seated Scapular Retraction  - 1 x daily - 7 x weekly - 2 sets - 10 reps  - Seated Shoulder Flexion Towel Slide at Table Top  - 1 x daily - 7 x weekly - 1 sets - 10 reps - 5-10\" hold  - Seated Shoulder Flexion AAROM with Dowel  - 1 x daily - 7 x weekly - 1 sets - 10 reps - 5\" hold  - Seated Upper Trapezius Stretch  - 1 x daily - 7 x weekly - 1 sets - 5 reps - 20\" hold         "

## 2025-06-09 ENCOUNTER — OFFICE VISIT (OUTPATIENT)
Dept: PHYSICAL THERAPY | Facility: CLINIC | Age: 85
End: 2025-06-09
Attending: INTERNAL MEDICINE
Payer: COMMERCIAL

## 2025-06-09 DIAGNOSIS — M25.512 ACUTE PAIN OF LEFT SHOULDER: Primary | ICD-10-CM

## 2025-06-09 PROCEDURE — 97012 MECHANICAL TRACTION THERAPY: CPT | Performed by: PHYSICAL THERAPIST

## 2025-06-09 PROCEDURE — 97112 NEUROMUSCULAR REEDUCATION: CPT | Performed by: PHYSICAL THERAPIST

## 2025-06-09 PROCEDURE — 97140 MANUAL THERAPY 1/> REGIONS: CPT | Performed by: PHYSICAL THERAPIST

## 2025-06-09 NOTE — PROGRESS NOTES
"Daily Note     Today's date: 2025  Patient name: Bettye Millan  : 1940  MRN: [id number]  Referring provider: Loc Weiss DO  Dx:   Encounter Diagnosis     ICD-10-CM    1. Acute pain of left shoulder  M25.512                      Subjective: patient reports minor nerve pain over the weekend only continued shoulder pain       Objective: See treatment diary below      Assessment: Tolerated treatment well. Patient was able to complete all exercises today without any radicular symptoms. Due to increased tolerance to exercises patient was able to return to using the cane. Patient still has complaints or ache and tightness in upper arm during exercises       Plan: Progress treatment as tolerated.       Precautions: HTN, h/o cancer       Manuals    L Shoulder all planes to chey         MFR to scalenes 10' 10'       Distraction                    Neuro Re-Ed          Shoulder Ext 2x 10  Green 2x 15  Green  15x   Green     Rows L1 2x10  Green 2x 15  Green  15x  Green     Rows L2 2x10  Green 2x 15  Green  15x   Green     Rows L3         Ball on Wall 30x CW  Green MB 30x CW  Green MB  30x CW/CC    Ajith ER w/TBand         Scalene Stretch 30\" hold    HOLD    Ulnar Nerve Glides     HELD              Ther Ex         UBE Alt for UE Strength    D/C    Shoulder 6-way Iso         Tband IR/ER         Pulleys   Flex/Scap 3' ea 3' ea  3' ea 3' ea   Table Slides  Flex/Scap         Shrugs 30x 30x  30x    Scap Retractions 2\" hold 30x 30x  30x    Supine Punch    20x    Cane Flex/Abd/ER  2x10 5\"  10x5\"    S/L ER & Abd         UT Stretch 3x 3x  3x 30\"    ER Pulse          ER door stretch  3x                              Ther Activity                                                  Gait Training                                                  Modalities                Mechanical traction            15'  8lb max-3lbmin  Seat back raised   10'  11lb max-5lbmin  Seat back raised                       Access Code: " "[email or web address]  URL: [email or web address]  Date: 04/30/2025  Prepared by: Zunilda    Exercises  - Seated Shoulder Shrugs  - 1 x daily - 7 x weekly - 2 sets - 10 reps  - Seated Scapular Retraction  - 1 x daily - 7 x weekly - 2 sets - 10 reps  - Seated Shoulder Flexion Towel Slide at Table Top  - 1 x daily - 7 x weekly - 1 sets - 10 reps - 5-10\" hold  - Seated Shoulder Flexion AAROM with Dowel  - 1 x daily - 7 x weekly - 1 sets - 10 reps - 5\" hold  - Seated Upper Trapezius Stretch  - 1 x daily - 7 x weekly - 1 sets - 5 reps - 20\" hold           "

## 2025-06-11 ENCOUNTER — OFFICE VISIT (OUTPATIENT)
Dept: PHYSICAL THERAPY | Facility: CLINIC | Age: 85
End: 2025-06-11
Attending: INTERNAL MEDICINE
Payer: COMMERCIAL

## 2025-06-11 DIAGNOSIS — M25.512 ACUTE PAIN OF LEFT SHOULDER: Primary | ICD-10-CM

## 2025-06-11 PROCEDURE — 97140 MANUAL THERAPY 1/> REGIONS: CPT | Performed by: PHYSICAL THERAPIST

## 2025-06-11 PROCEDURE — 97110 THERAPEUTIC EXERCISES: CPT | Performed by: PHYSICAL THERAPIST

## 2025-06-11 PROCEDURE — 97012 MECHANICAL TRACTION THERAPY: CPT | Performed by: PHYSICAL THERAPIST

## 2025-06-11 NOTE — PROGRESS NOTES
"Daily Note     Today's date: 2025  Patient name: Bettye Millan  : 1940  MRN: [id number]  Referring provider: Loc Weiss DO  Dx:   Encounter Diagnosis     ICD-10-CM    1. Acute pain of left shoulder  M25.512                      Subjective: patient reports complete lack of paraesthesia since last visit and only tightness remaining in her shoulder       Objective: See treatment diary below      Assessment: Tolerated treatment well. Patient exhibited good technique with therapeutic exercises with decreased irritability during exercises with very minor paraesthesia reported during cane exercises.      Plan: Continue per plan of care.      Precautions: HTN, h/o cancer       Manuals    L Shoulder all planes to chey         MFR to scalenes/UT 10' 10'   10'    Distraction                    Neuro Re-Ed          Shoulder Ext 2x 10  Green 2x 15  Green 2x 15  Green 15x   Green     Rows L1 2x10  Green 2x 15  Green 2x 15  Green 15x  Green     Rows L2 2x10  Green 2x 15  Green 2x 15  Green 15x   Green     Rows L3         Ball on Wall 30x CW  Green MB 30x CW  Green MB 30c CW  Green MB 30x CW/CC    Ajith ER w/TBand         Scalene Stretch 30\" hold    HOLD    Ulnar Nerve Glides     HELD              Ther Ex         UBE Alt for UE Strength    D/C    Shoulder 6-way Iso         Tband IR/ER         Pulleys   Flex/Scap 3' ea 3' ea 3' ea 3' ea 3' ea   Table Slides  Flex/Scap         Shrugs 30x 30x 30x 30x    Scap Retractions 2\" hold 30x 30x 30x 30x    Supine Punch    20x    Cane Flex/Abd/ER  2x10 5\"  10x5\"    S/L ER & Abd         UT Stretch 3x 3x 3x 3x 30\"    ER Pulse          ER door stretch  3x         scaption    2x10 1lb                  Ther Activity                                                  Gait Training                                                  Modalities                Mechanical traction            15'  8lb max-3lbmin  Seat back raised   10'  11lb max-5lbmin  Seat back raised       " "                Access Code: [email or web address]  URL: [email or web address]  Date: 04/30/2025  Prepared by: Zunilda    Exercises  - Seated Shoulder Shrugs  - 1 x daily - 7 x weekly - 2 sets - 10 reps  - Seated Scapular Retraction  - 1 x daily - 7 x weekly - 2 sets - 10 reps  - Seated Shoulder Flexion Towel Slide at Table Top  - 1 x daily - 7 x weekly - 1 sets - 10 reps - 5-10\" hold  - Seated Shoulder Flexion AAROM with Dowel  - 1 x daily - 7 x weekly - 1 sets - 10 reps - 5\" hold  - Seated Upper Trapezius Stretch  - 1 x daily - 7 x weekly - 1 sets - 5 reps - 20\" hold             "

## 2025-06-16 ENCOUNTER — OFFICE VISIT (OUTPATIENT)
Dept: PHYSICAL THERAPY | Facility: CLINIC | Age: 85
End: 2025-06-16
Attending: INTERNAL MEDICINE
Payer: COMMERCIAL

## 2025-06-16 DIAGNOSIS — M25.512 ACUTE PAIN OF LEFT SHOULDER: Primary | ICD-10-CM

## 2025-06-16 PROCEDURE — 97110 THERAPEUTIC EXERCISES: CPT

## 2025-06-16 PROCEDURE — 97112 NEUROMUSCULAR REEDUCATION: CPT

## 2025-06-16 PROCEDURE — 97012 MECHANICAL TRACTION THERAPY: CPT

## 2025-06-16 PROCEDURE — 97140 MANUAL THERAPY 1/> REGIONS: CPT

## 2025-06-16 NOTE — PROGRESS NOTES
"Daily Note     Today's date: 2025  Patient name: Bettye Millan  : 1940  MRN: [id number]  Referring provider: Loc Weiss DO  Dx:   Encounter Diagnosis     ICD-10-CM    1. Acute pain of left shoulder  M25.512           Start Time: 1404  Stop Time: 1458  Total time in clinic (min): 54 minutes    Subjective: Pt denies any symptoms upon arrival.       Objective: See treatment diary below      Assessment: Tolerated treatment well. No symptoms of paraesthesia throughout nor post tx.       Plan: Continue per plan of care.      Precautions: HTN, h/o cancer   Access Code: [email or web address]       Manuals  6   L Shoulder all planes to chey         MFR to scalenes/UT 10' 10'      Distraction                    Neuro Re-Ed          Shoulder Ext 2x 10  Green 2x 15  Green 2x 15  Green 2x15  Green     Rows L1 2x10  Green 2x 15  Green 2x 15  Green 2x15  Green     Rows L2 2x10  Green 2x 15  Green 2x 15  Green 2x15  Green     Rows L3         Ball on Wall 30x CW  Green MB 30x CW  Green MB 30c CW  Green MB 30x CW  Green MB     Ajith ER w/TBand         Scalene Stretch 30\" hold        Ulnar Nerve Glides                   Ther Ex         UBE Alt for UE Strength        Shoulder 6-way Iso         Tband IR/ER         Pulleys   Flex/Scap 3' ea 3' ea 3' ea 3' ea 3' ea   Table Slides  Flex/Scap         Shrugs 30x 30x 30x 30x    Scap Retractions 2\" hold 30x 30x 30x 30x    Supine Punch        Cane Flex/Abd/ER  2x10 5\"      S/L ER & Abd         UT Stretch 3x 3x 3x 3x     ER Pulse          ER door stretch  3x         scaption    2x10 1lb  2x10 1lb              TherAct                Gait                Modalities    Cervical Traction  10'  11lb max-5lbmin  Seat back raised                                      "

## 2025-06-18 ENCOUNTER — OFFICE VISIT (OUTPATIENT)
Dept: PHYSICAL THERAPY | Facility: CLINIC | Age: 85
End: 2025-06-18
Attending: INTERNAL MEDICINE
Payer: COMMERCIAL

## 2025-06-18 DIAGNOSIS — M25.512 ACUTE PAIN OF LEFT SHOULDER: Primary | ICD-10-CM

## 2025-06-18 PROCEDURE — 97112 NEUROMUSCULAR REEDUCATION: CPT | Performed by: PHYSICAL THERAPIST

## 2025-06-18 PROCEDURE — 97012 MECHANICAL TRACTION THERAPY: CPT | Performed by: PHYSICAL THERAPIST

## 2025-06-18 PROCEDURE — 97140 MANUAL THERAPY 1/> REGIONS: CPT | Performed by: PHYSICAL THERAPIST

## 2025-06-18 PROCEDURE — 97110 THERAPEUTIC EXERCISES: CPT | Performed by: PHYSICAL THERAPIST

## 2025-06-18 NOTE — PROGRESS NOTES
"Daily Note     Today's date: 2025  Patient name: Bettye Millan  : 1940  MRN: [id number]  Referring provider: Loc Weiss DO  Dx:   Encounter Diagnosis     ICD-10-CM    1. Acute pain of left shoulder  M25.512                      Subjective: Pt reports decreased occurences of \"zizzing\"      Objective: See treatment diary below      Assessment: Tolerated treatment well. Patient would benefit from continued PT      Plan: Progress treatment as tolerated.       Precautions: HTN, h/o cancer   Access Code: [email or web address]       Manuals    L Shoulder all planes to chey         MFR to scalenes/UT 10' 10'   10'   Distraction                    Neuro Re-Ed          Shoulder Ext 2x 10  Green 2x 15  Green 2x 15  Green 2x15  Green  2x15  Green    Rows L1 2x10  Green 2x 15  Green 2x 15  Green 2x15  Green  2x15  Green   Rows L2 2x10  Green 2x 15  Green 2x 15  Green 2x15  Green  2x15  Green    Rows L3         Ball on Wall 30x CW  Green MB 30x CW  Green MB 30c CW  Green MB 30x CW  Green MB  30x CW  Green MB   Ajith ER w/TBand         Scalene Stretch 30\" hold        Ulnar Nerve Glides                   Ther Ex         UBE Alt for UE Strength     120 ALT  6 min   Shoulder 6-way Iso         Tband IR/ER         Pulleys   Flex/Scap 3' ea 3' ea 3' ea 3' ea held   Table Slides  Flex/Scap         Shrugs 30x 30x 30x 30x 30x   Scap Retractions 2\" hold 30x 30x 30x 30x 30x   Supine Punch        Cane Flex/Abd/ER  2x10 5\"      S/L ER & Abd         UT Stretch 3x 3x 3x 3x  3x   ER Pulse          ER door stretch  3x         scaption    2x10 1lb  2x10 1lb 20x #1             TherAct                Gait                Modalities    Cervical Traction  10'  11lb max-5lbmin  Seat back raised       Mechanical Cervical Traction     10'  11lb max-5lbmin  Seat back raised                             "

## 2025-06-23 ENCOUNTER — OFFICE VISIT (OUTPATIENT)
Dept: PHYSICAL THERAPY | Facility: CLINIC | Age: 85
End: 2025-06-23
Attending: INTERNAL MEDICINE
Payer: COMMERCIAL

## 2025-06-23 DIAGNOSIS — M25.512 ACUTE PAIN OF LEFT SHOULDER: Primary | ICD-10-CM

## 2025-06-23 PROCEDURE — 97140 MANUAL THERAPY 1/> REGIONS: CPT

## 2025-06-23 PROCEDURE — 97112 NEUROMUSCULAR REEDUCATION: CPT

## 2025-06-23 PROCEDURE — 97110 THERAPEUTIC EXERCISES: CPT

## 2025-06-23 NOTE — PROGRESS NOTES
"Daily Note     Today's date: 2025  Patient name: Bettye Millan  : 1940  MRN: [id number]  Referring provider: Loc Weiss DO  Dx:   Encounter Diagnosis     ICD-10-CM    1. Acute pain of left shoulder  M25.512           Start Time: 1400  Stop Time: 1445  Total time in clinic (min): 45 minutes    Subjective: Pt reports no \"zizzing.\"  She notes she would like to DC this week.       Objective: See treatment diary below      Assessment: Tolerated treatment well. Increased resistance in scapular retraction with Tbs this visit. Pt will DC to HEP at next visit. Patient would benefit from continued PT      Plan: Progress treatment as tolerated.       Precautions: HTN, h/o cancer   Access Code: [email or web address]       Manuals    L Shoulder all planes to chey         MFR to scalenes/UT 10' 10'   10'   Distraction                    Neuro Re-Ed          Shoulder Ext 2x 15  Blue 2x 15  Green 2x 15  Green 2x15  Green  2x15  Green    Rows L1 2x10  Blue 2x 15  Green 2x 15  Green 2x15  Green  2x15  Green   Rows L2 2x10  Blue 2x 15  Green 2x 15  Green 2x15  Green  2x15  Green    Rows L3         Ball on Wall np 30x CW  Green MB 30c CW  Green MB 30x CW  Green MB  30x CW  Green MB   Ajith ER w/TBand         Scalene Stretch 30\" hold        Ulnar Nerve Glides                   Ther Ex         UBE Alt for UE Strength 120 ALT 6 min    120 ALT  6 min   Shoulder 6-way Iso         Tband IR/ER IR only BTB 2x10        Pulleys   Flex/Scap  3' ea 3' ea 3' ea held   Table Slides  Flex/Scap         Shrugs 30x 30x 30x 30x 30x   Scap Retractions 2\" hold 30x 30x 30x 30x 30x   Supine Punch        Cane Flex/Abd/ER  2x10 5\"      S/L ER & Abd         UT Stretch 3x 3x 3x 3x  3x   ER Pulse          ER door stretch           scaption  2x10 1lb  2x10 1lb  2x10 1lb 20x #1             TherAct                Gait                Modalities    Cervical Traction  10'  11lb max-5lbmin  Seat back raised       Mechanical Cervical Traction     " 10'  11lb max-5lbmin  Seat back raised

## 2025-06-25 ENCOUNTER — OFFICE VISIT (OUTPATIENT)
Dept: PHYSICAL THERAPY | Facility: CLINIC | Age: 85
End: 2025-06-25
Attending: INTERNAL MEDICINE
Payer: COMMERCIAL

## 2025-06-25 DIAGNOSIS — M25.512 ACUTE PAIN OF LEFT SHOULDER: Primary | ICD-10-CM

## 2025-06-25 PROCEDURE — 97110 THERAPEUTIC EXERCISES: CPT

## 2025-06-25 NOTE — PROGRESS NOTES
PT Discharge    Today's date: 2025  Patient name: Bettye Millan  : 1940  MRN: [id number]  Referring provider: Loc Weiss DO  Dx:   Encounter Diagnosis     ICD-10-CM    1. Acute pain of left shoulder  M25.512               Start Time: 1412  Stop Time: 1436  Total time in clinic (min): 24 minutes    Assessment  Impairments: abnormal or restricted ROM, activity intolerance, impaired physical strength, pain with function and poor posture   Other impairment: decreased flexibility    Assessment details: Patient reports greatly improved paraesthesia and minimal pain and would like to be discharged at this time. Range of motion and strength have improved and patient has returned to all ADLs without high levels of pain. Home exercises were discussed and demonstrated to patient who demonstrated good understanding and ability to perform at home.    Understanding of Dx/Px/POC: good     Prognosis: good    Goals  STGs:  1.  Initiate and complete HEP with verbal cues. - MET   2.  Decrease L shoulder pain by > 25% in 4 weeks. - MET  3.  Improve L shoulder strength by 1 grade in 4 weeks. - NOT MET  4.  Improve L shoulder ROM by 15-20 degrees in 4 weeks. - MET   LTGs:  1.  Patient to be I with HEP in 12 weeks.  2.  Improve L shoulder ROM to WFL t/o in 12 weeks to improve function.  3.  Improve L UE strength to > or = to 4/5 t/o in 12 weeks to improve function.  4.  Decrease L shoulder pain to < or = to 2-3/10 with activity in 12 weeks to improve function.    5.  Recreational performance in related activities is improved to PLOF in 12 weeks. - MET  6.  Patient to report improved sleep in 12 weeks. - MET  7.  FOTO outcomes to meet or exceed expectations in 12 weeks. - MET  8.  ADL performance is improved to PLOF in 12 weeks. - MET        Plan  Patient would benefit from: home program    Planned therapy interventions: home exercise program    Treatment plan discussed with: patient  Plan details: Discharge patient at  "this time       Subjective Evaluation    History of Present Illness  Mechanism of injury: The patient states that she developed L shoulder pain a few months ago.  She notes that she would get pain when she would use it but did not seek treatment for it.       She had seen her PCP on 25 for her annual wellness visit, told him about her shoulder pain.  No imaging was completed.  She was referred to OPPT and she now presents for her evaluation.  She will be going back to see the doctor as needed for her follow up appointment.  She is to see how PT goes and call the doctor if no change with therapy.        She has pain with driving, reaching and lifting.  The patient is constant.    Pain is along the top of the shoulder and can radiate over towards her neck.  Pain can also radiate into her shoulder blade.  She denies any radiating arm pain, denies any N&T into LUE.    Difficulty sleeping noted, unable to lay on her L side.    Patient Goals  Patient goals for therapy: increased motion, decreased pain and increased strength  Patient goal: \"To not have the pain in my shoulder.\"  Pain  Current pain ratin  At best pain ratin  At worst pain rating: 3  Location: L Shoulder - top of shoulder, to neck, into shoulder blade  Quality: burning, dull ache, discomfort and radiating  Relieving factors: heat and rest (Biofreeze, hot shower)  Aggravating factors: lifting (Driving, reaching, carrying, saggital plane activity)  Progression: improved    Social Support  Lives with: spouse    Employment status: not working  Hand dominance: right        Objective     Static Posture     Head  Forward.    Shoulders  Rounded.    Postural Observations  Seated posture: fair      Palpation   Left   No palpable tenderness to the rhomboids.   Tenderness of the levator scapulae and upper trapezius.     Right   No palpable tenderness to the levator scapulae, rhomboids and upper trapezius.     Tenderness     Left Shoulder   Tenderness in the " "AC joint.     Neurological Testing     Sensation     Shoulder   Left Shoulder   Intact: light touch    Right Shoulder   Intact: Light touch    Active Range of Motion   Cervical/Thoracic Spine       Cervical    Flexion: 15 degrees   Extension: 10 (peripheralizes) degrees      Left lateral flexion: 13 degrees      Right lateral flexion: 20 degrees      Left Shoulder   Flexion: 104 degrees with pain  Abduction: 85 degrees with pain  External rotation 45°: 45 degrees with pain  Internal rotation 45°: 55 degrees     Right Shoulder   Flexion: 170 degrees   Abduction: 170 degrees   External rotation 90°: 80 degrees  Internal rotation 90°: 55 degrees     Left Elbow   Flexion: WFL  Extension: WFL    Right Elbow   Flexion: WFL  Extension: WFL    Passive Range of Motion   Left Shoulder   Flexion: 120 degrees with pain  Abduction: 95 degrees with pain  External rotation 45°: 50 degrees with pain  Internal rotation 45°: 55 degrees with pain  Mechanical Assessment    Cervical    Seated Extension:   Pain location: peripheralized    Thoracic      Lumbar      Strength/Myotome Testing     Left Shoulder     Planes of Motion   Flexion: 2+   Abduction: 2+   External rotation at 45°: 3   Internal rotation at 0°: 4     Right Shoulder     Planes of Motion   Flexion: WFL   Abduction: WFL   External rotation at 0°: WFL   Internal rotation at 0°: WFL                 Precautions: HTN, h/o cancer       Manuals 6/23 6/25 6/16 6/18   L Shoulder all planes to chey           MFR to scalenes/UT 10'     10'   Distraction                        Neuro Re-Ed            Shoulder Ext 2x 15  Blue   2x15  Green  2x15  Green    Rows L1 2x10  Blue   2x15  Green  2x15  Green   Rows L2 2x10  Blue   2x15  Green  2x15  Green    Rows L3           Ball on Wall np   30x CW  Green MB  30x CW  Green MB   Ajith ER w/TBand           Scalene Stretch 30\" hold           Ulnar Nerve Glides                        Ther Ex           UBE Alt for UE Strength 120 ALT 6 min 120 ALT " "6min    120 ALT  6 min   Shoulder 6-way Iso           Tband IR/ER IR only BTB 2x10         Pulleys   Flex/Scap     3' ea held   Table Slides  Flex/Scap           Shrugs 30x   30x 30x   Scap Retractions 2\" hold 30x 30x  30x 30x   Supine Punch           Cane Flex/Abd/ER   3x        S/L ER & Abd           UT Stretch 3x 3x  3x  3x   ER Pulse            ER door stretch             scaption  2x10 1lb    2x10 1lb 20x #1   Towel wall slides     10x                TherAct                       Gait                       Modalities     Cervical Traction  10'  11lb max-5lbmin  Seat back raised         Mechanical Cervical Traction       10'  11lb max-5lbmin  Seat back raised         Access Code: [email or web address]  URL: [email or web address]  Date: 04/30/2025  Prepared by: Zunilda    Exercises  - Seated Shoulder Shrugs  - 1 x daily - 7 x weekly - 2 sets - 10 reps  - Seated Scapular Retraction  - 1 x daily - 7 x weekly - 2 sets - 10 reps  - Seated Shoulder Flexion Towel Slide at Table Top  - 1 x daily - 7 x weekly - 1 sets - 10 reps - 5-10\" hold  - Seated Shoulder Flexion AAROM with Dowel  - 1 x daily - 7 x weekly - 1 sets - 10 reps - 5\" hold  - Seated Upper Trapezius Stretch  - 1 x daily - 7 x weekly - 1 sets - 5 reps - 20\" hold         "

## 2025-06-30 ENCOUNTER — APPOINTMENT (OUTPATIENT)
Dept: PHYSICAL THERAPY | Facility: CLINIC | Age: 85
End: 2025-06-30
Attending: INTERNAL MEDICINE
Payer: COMMERCIAL